# Patient Record
Sex: FEMALE | Race: BLACK OR AFRICAN AMERICAN | NOT HISPANIC OR LATINO | Employment: OTHER
[De-identification: names, ages, dates, MRNs, and addresses within clinical notes are randomized per-mention and may not be internally consistent; named-entity substitution may affect disease eponyms.]

---

## 2017-02-02 ENCOUNTER — RECORDS - HEALTHEAST (OUTPATIENT)
Dept: ADMINISTRATIVE | Facility: OTHER | Age: 77
End: 2017-02-02

## 2017-02-02 ENCOUNTER — RECORDS - HEALTHEAST (OUTPATIENT)
Dept: BONE DENSITY | Facility: CLINIC | Age: 77
End: 2017-02-02

## 2017-02-02 ENCOUNTER — COMMUNICATION - HEALTHEAST (OUTPATIENT)
Dept: INTERNAL MEDICINE | Facility: CLINIC | Age: 77
End: 2017-02-02

## 2017-02-02 ENCOUNTER — OFFICE VISIT - HEALTHEAST (OUTPATIENT)
Dept: INTERNAL MEDICINE | Facility: CLINIC | Age: 77
End: 2017-02-02

## 2017-02-02 DIAGNOSIS — I10 ESSENTIAL HYPERTENSION WITH GOAL BLOOD PRESSURE LESS THAN 140/90: ICD-10-CM

## 2017-02-02 DIAGNOSIS — M48.062 SPINAL STENOSIS, LUMBAR REGION, WITH NEUROGENIC CLAUDICATION: ICD-10-CM

## 2017-02-02 DIAGNOSIS — Z78.0 ASYMPTOMATIC MENOPAUSAL STATE: ICD-10-CM

## 2017-02-02 DIAGNOSIS — E11.9 CONTROLLED TYPE 2 DIABETES MELLITUS WITHOUT COMPLICATION, WITHOUT LONG-TERM CURRENT USE OF INSULIN (H): ICD-10-CM

## 2017-02-02 DIAGNOSIS — M48.061 SPINAL STENOSIS OF LUMBAR REGION: ICD-10-CM

## 2017-02-02 LAB — HBA1C MFR BLD: 6.4 % (ref 3.5–6)

## 2017-02-02 ASSESSMENT — MIFFLIN-ST. JEOR: SCORE: 1332.25

## 2017-02-03 ENCOUNTER — COMMUNICATION - HEALTHEAST (OUTPATIENT)
Dept: INTERNAL MEDICINE | Facility: CLINIC | Age: 77
End: 2017-02-03

## 2017-02-09 ENCOUNTER — AMBULATORY - HEALTHEAST (OUTPATIENT)
Dept: INTERNAL MEDICINE | Facility: CLINIC | Age: 77
End: 2017-02-09

## 2017-02-12 ENCOUNTER — COMMUNICATION - HEALTHEAST (OUTPATIENT)
Dept: INTERNAL MEDICINE | Facility: CLINIC | Age: 77
End: 2017-02-12

## 2017-02-12 DIAGNOSIS — I10 ESSENTIAL HYPERTENSION: ICD-10-CM

## 2017-02-13 ENCOUNTER — COMMUNICATION - HEALTHEAST (OUTPATIENT)
Dept: INTERNAL MEDICINE | Facility: CLINIC | Age: 77
End: 2017-02-13

## 2017-02-16 ENCOUNTER — AMBULATORY - HEALTHEAST (OUTPATIENT)
Dept: PODIATRY | Facility: CLINIC | Age: 77
End: 2017-02-16

## 2017-02-16 DIAGNOSIS — E11.9 TYPE 2 DIABETES MELLITUS WITHOUT COMPLICATION, WITHOUT LONG-TERM CURRENT USE OF INSULIN (H): ICD-10-CM

## 2017-02-16 DIAGNOSIS — L60.2 ONYCHAUXIS: ICD-10-CM

## 2017-02-16 DIAGNOSIS — B35.1 NAIL FUNGUS: ICD-10-CM

## 2017-03-13 ENCOUNTER — COMMUNICATION - HEALTHEAST (OUTPATIENT)
Dept: SCHEDULING | Facility: CLINIC | Age: 77
End: 2017-03-13

## 2017-03-14 ENCOUNTER — COMMUNICATION - HEALTHEAST (OUTPATIENT)
Dept: INTERNAL MEDICINE | Facility: CLINIC | Age: 77
End: 2017-03-14

## 2017-03-16 ENCOUNTER — COMMUNICATION - HEALTHEAST (OUTPATIENT)
Dept: INTERNAL MEDICINE | Facility: CLINIC | Age: 77
End: 2017-03-16

## 2017-03-16 DIAGNOSIS — E11.9 DIABETES (H): ICD-10-CM

## 2017-05-05 ENCOUNTER — COMMUNICATION - HEALTHEAST (OUTPATIENT)
Dept: INTERNAL MEDICINE | Facility: CLINIC | Age: 77
End: 2017-05-05

## 2017-05-25 ENCOUNTER — OFFICE VISIT - HEALTHEAST (OUTPATIENT)
Dept: INTERNAL MEDICINE | Facility: CLINIC | Age: 77
End: 2017-05-25

## 2017-05-25 DIAGNOSIS — E11.9 CONTROLLED TYPE 2 DIABETES MELLITUS WITHOUT COMPLICATION, WITHOUT LONG-TERM CURRENT USE OF INSULIN (H): ICD-10-CM

## 2017-05-25 DIAGNOSIS — I10 ESSENTIAL HYPERTENSION WITH GOAL BLOOD PRESSURE LESS THAN 140/90: ICD-10-CM

## 2017-05-25 LAB — HBA1C MFR BLD: 6.3 % (ref 3.5–6)

## 2017-05-25 ASSESSMENT — MIFFLIN-ST. JEOR: SCORE: 1330.44

## 2017-05-30 ENCOUNTER — AMBULATORY - HEALTHEAST (OUTPATIENT)
Dept: INTERNAL MEDICINE | Facility: CLINIC | Age: 77
End: 2017-05-30

## 2017-06-23 ENCOUNTER — COMMUNICATION - HEALTHEAST (OUTPATIENT)
Dept: INTERNAL MEDICINE | Facility: CLINIC | Age: 77
End: 2017-06-23

## 2017-06-24 ENCOUNTER — OFFICE VISIT - HEALTHEAST (OUTPATIENT)
Dept: FAMILY MEDICINE | Facility: CLINIC | Age: 77
End: 2017-06-24

## 2017-06-24 DIAGNOSIS — S80.812A ABRASION OF LEG, LEFT, INITIAL ENCOUNTER: ICD-10-CM

## 2017-06-24 DIAGNOSIS — S80.12XA CONTUSION OF LEFT LOWER LEG, INITIAL ENCOUNTER: ICD-10-CM

## 2017-06-30 ENCOUNTER — OFFICE VISIT - HEALTHEAST (OUTPATIENT)
Dept: INTERNAL MEDICINE | Facility: CLINIC | Age: 77
End: 2017-06-30

## 2017-06-30 DIAGNOSIS — S81.802A WOUND OF LEFT LOWER EXTREMITY: ICD-10-CM

## 2017-06-30 ASSESSMENT — MIFFLIN-ST. JEOR: SCORE: 1339.51

## 2017-07-05 ENCOUNTER — RECORDS - HEALTHEAST (OUTPATIENT)
Dept: ADMINISTRATIVE | Facility: OTHER | Age: 77
End: 2017-07-05

## 2017-07-10 ENCOUNTER — COMMUNICATION - HEALTHEAST (OUTPATIENT)
Dept: INTERNAL MEDICINE | Facility: CLINIC | Age: 77
End: 2017-07-10

## 2017-09-06 ENCOUNTER — OFFICE VISIT - HEALTHEAST (OUTPATIENT)
Dept: INTERNAL MEDICINE | Facility: CLINIC | Age: 77
End: 2017-09-06

## 2017-09-06 DIAGNOSIS — Z00.00 HEALTH CARE MAINTENANCE: ICD-10-CM

## 2017-09-06 DIAGNOSIS — E11.9 CONTROLLED TYPE 2 DIABETES MELLITUS WITHOUT COMPLICATION, WITHOUT LONG-TERM CURRENT USE OF INSULIN (H): ICD-10-CM

## 2017-09-06 DIAGNOSIS — I10 ESSENTIAL HYPERTENSION WITH GOAL BLOOD PRESSURE LESS THAN 140/90: ICD-10-CM

## 2017-09-06 LAB — HBA1C MFR BLD: 6.5 % (ref 3.5–6)

## 2017-09-06 ASSESSMENT — MIFFLIN-ST. JEOR: SCORE: 1335.43

## 2017-09-12 ENCOUNTER — COMMUNICATION - HEALTHEAST (OUTPATIENT)
Dept: SCHEDULING | Facility: CLINIC | Age: 77
End: 2017-09-12

## 2017-10-05 ENCOUNTER — AMBULATORY - HEALTHEAST (OUTPATIENT)
Dept: PODIATRY | Facility: CLINIC | Age: 77
End: 2017-10-05

## 2017-10-05 DIAGNOSIS — B35.1 NAIL FUNGUS: ICD-10-CM

## 2017-10-05 DIAGNOSIS — E11.9 TYPE 2 DIABETES MELLITUS WITHOUT COMPLICATION, WITHOUT LONG-TERM CURRENT USE OF INSULIN (H): ICD-10-CM

## 2017-10-05 DIAGNOSIS — L60.2 ONYCHAUXIS: ICD-10-CM

## 2017-10-05 ASSESSMENT — MIFFLIN-ST. JEOR: SCORE: 1334.98

## 2017-12-09 ENCOUNTER — HOSPITAL ENCOUNTER (OUTPATIENT)
Dept: MAMMOGRAPHY | Facility: HOSPITAL | Age: 77
Discharge: HOME | End: 2017-12-09
Attending: INTERNAL MEDICINE

## 2017-12-09 DIAGNOSIS — Z12.31 VISIT FOR SCREENING MAMMOGRAM: ICD-10-CM

## 2018-01-08 ENCOUNTER — OFFICE VISIT - HEALTHEAST (OUTPATIENT)
Dept: INTERNAL MEDICINE | Facility: CLINIC | Age: 78
End: 2018-01-08

## 2018-01-08 DIAGNOSIS — I10 ESSENTIAL (PRIMARY) HYPERTENSION: ICD-10-CM

## 2018-01-08 DIAGNOSIS — C44.319 BASAL CELL CARCINOMA, FOREHEAD: ICD-10-CM

## 2018-01-08 DIAGNOSIS — E11.9 DM II (DIABETES MELLITUS, TYPE II), CONTROLLED (H): ICD-10-CM

## 2018-01-08 DIAGNOSIS — I10 ESSENTIAL HYPERTENSION WITH GOAL BLOOD PRESSURE LESS THAN 140/90: ICD-10-CM

## 2018-01-08 LAB
ANION GAP SERPL CALCULATED.3IONS-SCNC: 10 MMOL/L (ref 5–18)
BUN SERPL-MCNC: 21 MG/DL (ref 8–28)
CALCIUM SERPL-MCNC: 9.8 MG/DL (ref 8.5–10.5)
CHLORIDE BLD-SCNC: 101 MMOL/L (ref 98–107)
CO2 SERPL-SCNC: 29 MMOL/L (ref 22–31)
CREAT SERPL-MCNC: 1.32 MG/DL (ref 0.6–1.1)
GFR SERPL CREATININE-BSD FRML MDRD: 39 ML/MIN/1.73M2
GLUCOSE BLD-MCNC: 138 MG/DL (ref 70–125)
POTASSIUM BLD-SCNC: 3.9 MMOL/L (ref 3.5–5)
SODIUM SERPL-SCNC: 140 MMOL/L (ref 136–145)

## 2018-01-09 LAB — HBA1C MFR BLD: 6.9 % (ref 4.2–6.1)

## 2018-01-14 ENCOUNTER — COMMUNICATION - HEALTHEAST (OUTPATIENT)
Dept: INTERNAL MEDICINE | Facility: CLINIC | Age: 78
End: 2018-01-14

## 2018-01-14 DIAGNOSIS — I10 ESSENTIAL HYPERTENSION WITH GOAL BLOOD PRESSURE LESS THAN 140/90: ICD-10-CM

## 2018-04-09 ENCOUNTER — COMMUNICATION - HEALTHEAST (OUTPATIENT)
Dept: INTERNAL MEDICINE | Facility: CLINIC | Age: 78
End: 2018-04-09

## 2018-04-09 DIAGNOSIS — E66.3 OVER WEIGHT: ICD-10-CM

## 2018-04-25 ENCOUNTER — COMMUNICATION - HEALTHEAST (OUTPATIENT)
Dept: INTERNAL MEDICINE | Facility: CLINIC | Age: 78
End: 2018-04-25

## 2018-04-25 DIAGNOSIS — E11.9 DM II (DIABETES MELLITUS, TYPE II), CONTROLLED (H): ICD-10-CM

## 2018-04-26 ENCOUNTER — COMMUNICATION - HEALTHEAST (OUTPATIENT)
Dept: INTERNAL MEDICINE | Facility: CLINIC | Age: 78
End: 2018-04-26

## 2018-07-10 ENCOUNTER — RECORDS - HEALTHEAST (OUTPATIENT)
Dept: ADMINISTRATIVE | Facility: OTHER | Age: 78
End: 2018-07-10

## 2018-07-17 ENCOUNTER — OFFICE VISIT - HEALTHEAST (OUTPATIENT)
Dept: INTERNAL MEDICINE | Facility: CLINIC | Age: 78
End: 2018-07-17

## 2018-07-17 DIAGNOSIS — N18.30 CKD (CHRONIC KIDNEY DISEASE) STAGE 3, GFR 30-59 ML/MIN (H): ICD-10-CM

## 2018-07-17 DIAGNOSIS — E11.22 CONTROLLED TYPE 2 DIABETES MELLITUS WITH STAGE 3 CHRONIC KIDNEY DISEASE, WITHOUT LONG-TERM CURRENT USE OF INSULIN (H): ICD-10-CM

## 2018-07-17 DIAGNOSIS — N18.30 CONTROLLED TYPE 2 DIABETES MELLITUS WITH STAGE 3 CHRONIC KIDNEY DISEASE, WITHOUT LONG-TERM CURRENT USE OF INSULIN (H): ICD-10-CM

## 2018-07-17 DIAGNOSIS — I10 ESSENTIAL HYPERTENSION WITH GOAL BLOOD PRESSURE LESS THAN 140/90: ICD-10-CM

## 2018-07-17 LAB
AST SERPL W P-5'-P-CCNC: 17 U/L (ref 0–40)
CHOLEST SERPL-MCNC: 147 MG/DL
FASTING STATUS PATIENT QL REPORTED: NO
HBA1C MFR BLD: 6.4 % (ref 3.5–6)
HDLC SERPL-MCNC: 66 MG/DL
LDLC SERPL CALC-MCNC: 66 MG/DL
TRIGL SERPL-MCNC: 77 MG/DL

## 2018-07-30 ENCOUNTER — HOSPITAL ENCOUNTER (OUTPATIENT)
Dept: NUTRITION | Facility: HOSPITAL | Age: 78
Discharge: HOME | End: 2018-07-30
Attending: INTERNAL MEDICINE

## 2018-08-20 ENCOUNTER — COMMUNICATION - HEALTHEAST (OUTPATIENT)
Dept: INTERNAL MEDICINE | Facility: CLINIC | Age: 78
End: 2018-08-20

## 2018-08-20 DIAGNOSIS — E87.6 HYPOKALEMIA: ICD-10-CM

## 2018-10-16 ENCOUNTER — COMMUNICATION - HEALTHEAST (OUTPATIENT)
Dept: INTERNAL MEDICINE | Facility: CLINIC | Age: 78
End: 2018-10-16

## 2018-10-16 DIAGNOSIS — E11.9 DM II (DIABETES MELLITUS, TYPE II), CONTROLLED (H): ICD-10-CM

## 2018-10-16 DIAGNOSIS — E87.6 HYPOKALEMIA: ICD-10-CM

## 2018-10-16 DIAGNOSIS — I10 ESSENTIAL HYPERTENSION WITH GOAL BLOOD PRESSURE LESS THAN 140/90: ICD-10-CM

## 2018-11-26 ENCOUNTER — COMMUNICATION - HEALTHEAST (OUTPATIENT)
Dept: INTERNAL MEDICINE | Facility: CLINIC | Age: 78
End: 2018-11-26

## 2018-11-27 ENCOUNTER — OFFICE VISIT - HEALTHEAST (OUTPATIENT)
Dept: INTERNAL MEDICINE | Facility: CLINIC | Age: 78
End: 2018-11-27

## 2018-11-27 DIAGNOSIS — N18.30 CONTROLLED TYPE 2 DIABETES MELLITUS WITH STAGE 3 CHRONIC KIDNEY DISEASE, WITHOUT LONG-TERM CURRENT USE OF INSULIN (H): ICD-10-CM

## 2018-11-27 DIAGNOSIS — E11.22 CONTROLLED TYPE 2 DIABETES MELLITUS WITH STAGE 3 CHRONIC KIDNEY DISEASE, WITHOUT LONG-TERM CURRENT USE OF INSULIN (H): ICD-10-CM

## 2018-11-27 DIAGNOSIS — L82.0 INFLAMED SEBORRHEIC KERATOSIS: ICD-10-CM

## 2018-11-27 LAB
CREAT UR-MCNC: 70 MG/DL
HBA1C MFR BLD: 6.4 % (ref 3.5–6)
MICROALBUMIN UR-MCNC: <0.5 MG/DL (ref 0–1.99)
MICROALBUMIN/CREAT UR: NORMAL MG/G

## 2018-11-27 ASSESSMENT — MIFFLIN-ST. JEOR: SCORE: 1316.83

## 2018-12-03 ENCOUNTER — COMMUNICATION - HEALTHEAST (OUTPATIENT)
Dept: INTERNAL MEDICINE | Facility: CLINIC | Age: 78
End: 2018-12-03

## 2018-12-03 DIAGNOSIS — E87.6 HYPOKALEMIA: ICD-10-CM

## 2019-01-16 ENCOUNTER — COMMUNICATION - HEALTHEAST (OUTPATIENT)
Dept: INTERNAL MEDICINE | Facility: CLINIC | Age: 79
End: 2019-01-16

## 2019-01-16 DIAGNOSIS — I10 ESSENTIAL HYPERTENSION WITH GOAL BLOOD PRESSURE LESS THAN 140/90: ICD-10-CM

## 2019-02-21 ENCOUNTER — COMMUNICATION - HEALTHEAST (OUTPATIENT)
Dept: INTERNAL MEDICINE | Facility: CLINIC | Age: 79
End: 2019-02-21

## 2019-02-21 DIAGNOSIS — I10 ESSENTIAL HYPERTENSION WITH GOAL BLOOD PRESSURE LESS THAN 140/90: ICD-10-CM

## 2019-03-10 ENCOUNTER — COMMUNICATION - HEALTHEAST (OUTPATIENT)
Dept: INTERNAL MEDICINE | Facility: CLINIC | Age: 79
End: 2019-03-10

## 2019-03-10 DIAGNOSIS — E11.9 DM II (DIABETES MELLITUS, TYPE II), CONTROLLED (H): ICD-10-CM

## 2019-03-20 ENCOUNTER — COMMUNICATION - HEALTHEAST (OUTPATIENT)
Dept: SCHEDULING | Facility: CLINIC | Age: 79
End: 2019-03-20

## 2019-03-22 ENCOUNTER — OFFICE VISIT - HEALTHEAST (OUTPATIENT)
Dept: INTERNAL MEDICINE | Facility: CLINIC | Age: 79
End: 2019-03-22

## 2019-03-22 DIAGNOSIS — E11.22 CONTROLLED TYPE 2 DIABETES MELLITUS WITH STAGE 3 CHRONIC KIDNEY DISEASE, WITHOUT LONG-TERM CURRENT USE OF INSULIN (H): ICD-10-CM

## 2019-03-22 DIAGNOSIS — I10 ESSENTIAL HYPERTENSION WITH GOAL BLOOD PRESSURE LESS THAN 140/90: ICD-10-CM

## 2019-03-22 DIAGNOSIS — Z78.0 POST-MENOPAUSAL: ICD-10-CM

## 2019-03-22 DIAGNOSIS — Z12.31 ENCOUNTER FOR SCREENING MAMMOGRAM FOR BREAST CANCER: ICD-10-CM

## 2019-03-22 DIAGNOSIS — L82.0 INFLAMED SEBORRHEIC KERATOSIS: ICD-10-CM

## 2019-03-22 DIAGNOSIS — N18.30 CONTROLLED TYPE 2 DIABETES MELLITUS WITH STAGE 3 CHRONIC KIDNEY DISEASE, WITHOUT LONG-TERM CURRENT USE OF INSULIN (H): ICD-10-CM

## 2019-03-22 LAB
ALBUMIN SERPL-MCNC: 4.4 G/DL (ref 3.5–5)
ALP SERPL-CCNC: 52 U/L (ref 45–120)
ALT SERPL W P-5'-P-CCNC: 12 U/L (ref 0–45)
ANION GAP SERPL CALCULATED.3IONS-SCNC: 12 MMOL/L (ref 5–18)
AST SERPL W P-5'-P-CCNC: 20 U/L (ref 0–40)
BILIRUB SERPL-MCNC: 0.6 MG/DL (ref 0–1)
BUN SERPL-MCNC: 28 MG/DL (ref 8–28)
CALCIUM SERPL-MCNC: 10.8 MG/DL (ref 8.5–10.5)
CHLORIDE BLD-SCNC: 101 MMOL/L (ref 98–107)
CHOLEST SERPL-MCNC: 148 MG/DL
CO2 SERPL-SCNC: 29 MMOL/L (ref 22–31)
CREAT SERPL-MCNC: 1.39 MG/DL (ref 0.6–1.1)
ERYTHROCYTE [DISTWIDTH] IN BLOOD BY AUTOMATED COUNT: 12.9 % (ref 11–14.5)
GFR SERPL CREATININE-BSD FRML MDRD: 37 ML/MIN/1.73M2
GLUCOSE BLD-MCNC: 88 MG/DL (ref 70–125)
HBA1C MFR BLD: 5.9 % (ref 3.5–6)
HCT VFR BLD AUTO: 37.5 % (ref 35–47)
HDLC SERPL-MCNC: 63 MG/DL
HGB BLD-MCNC: 12.5 G/DL (ref 12–16)
LDLC SERPL CALC-MCNC: 67 MG/DL
MCH RBC QN AUTO: 30.1 PG (ref 27–34)
MCHC RBC AUTO-ENTMCNC: 33.3 G/DL (ref 32–36)
MCV RBC AUTO: 90 FL (ref 80–100)
PLATELET # BLD AUTO: 222 THOU/UL (ref 140–440)
PMV BLD AUTO: 9 FL (ref 7–10)
POTASSIUM BLD-SCNC: 3.9 MMOL/L (ref 3.5–5)
PROT SERPL-MCNC: 6.8 G/DL (ref 6–8)
RBC # BLD AUTO: 4.14 MILL/UL (ref 3.8–5.4)
SODIUM SERPL-SCNC: 142 MMOL/L (ref 136–145)
TRIGL SERPL-MCNC: 88 MG/DL
WBC: 3.8 THOU/UL (ref 4–11)

## 2019-03-22 ASSESSMENT — MIFFLIN-ST. JEOR: SCORE: 1313.66

## 2019-03-25 ENCOUNTER — RECORDS - HEALTHEAST (OUTPATIENT)
Dept: HEALTH INFORMATION MANAGEMENT | Facility: CLINIC | Age: 79
End: 2019-03-25

## 2019-04-01 ENCOUNTER — RECORDS - HEALTHEAST (OUTPATIENT)
Dept: ADMINISTRATIVE | Facility: OTHER | Age: 79
End: 2019-04-01

## 2019-04-01 ENCOUNTER — RECORDS - HEALTHEAST (OUTPATIENT)
Dept: MAMMOGRAPHY | Facility: CLINIC | Age: 79
End: 2019-04-01

## 2019-04-01 ENCOUNTER — RECORDS - HEALTHEAST (OUTPATIENT)
Dept: BONE DENSITY | Facility: CLINIC | Age: 79
End: 2019-04-01

## 2019-04-01 DIAGNOSIS — Z78.0 ASYMPTOMATIC MENOPAUSAL STATE: ICD-10-CM

## 2019-04-01 DIAGNOSIS — Z12.31 ENCOUNTER FOR SCREENING MAMMOGRAM FOR MALIGNANT NEOPLASM OF BREAST: ICD-10-CM

## 2019-04-19 ENCOUNTER — COMMUNICATION - HEALTHEAST (OUTPATIENT)
Dept: INTERNAL MEDICINE | Facility: CLINIC | Age: 79
End: 2019-04-19

## 2019-04-29 ENCOUNTER — COMMUNICATION - HEALTHEAST (OUTPATIENT)
Dept: INTERNAL MEDICINE | Facility: CLINIC | Age: 79
End: 2019-04-29

## 2019-06-20 ENCOUNTER — COMMUNICATION - HEALTHEAST (OUTPATIENT)
Dept: INTERNAL MEDICINE | Facility: CLINIC | Age: 79
End: 2019-06-20

## 2019-07-01 ENCOUNTER — COMMUNICATION - HEALTHEAST (OUTPATIENT)
Dept: INTERNAL MEDICINE | Facility: CLINIC | Age: 79
End: 2019-07-01

## 2019-07-01 DIAGNOSIS — E11.9 CONTROLLED TYPE 2 DIABETES MELLITUS WITHOUT COMPLICATION, WITHOUT LONG-TERM CURRENT USE OF INSULIN (H): ICD-10-CM

## 2019-07-16 ENCOUNTER — RECORDS - HEALTHEAST (OUTPATIENT)
Dept: ADMINISTRATIVE | Facility: OTHER | Age: 79
End: 2019-07-16

## 2019-07-23 ENCOUNTER — RECORDS - HEALTHEAST (OUTPATIENT)
Dept: HEALTH INFORMATION MANAGEMENT | Facility: CLINIC | Age: 79
End: 2019-07-23

## 2019-07-30 ENCOUNTER — RECORDS - HEALTHEAST (OUTPATIENT)
Dept: ADMINISTRATIVE | Facility: OTHER | Age: 79
End: 2019-07-30

## 2019-09-11 ENCOUNTER — OFFICE VISIT - HEALTHEAST (OUTPATIENT)
Dept: INTERNAL MEDICINE | Facility: CLINIC | Age: 79
End: 2019-09-11

## 2019-09-11 DIAGNOSIS — N18.30 CONTROLLED TYPE 2 DIABETES MELLITUS WITH STAGE 3 CHRONIC KIDNEY DISEASE, WITHOUT LONG-TERM CURRENT USE OF INSULIN (H): ICD-10-CM

## 2019-09-11 DIAGNOSIS — I10 ESSENTIAL HYPERTENSION WITH GOAL BLOOD PRESSURE LESS THAN 140/90: ICD-10-CM

## 2019-09-11 DIAGNOSIS — M79.645 PAIN OF LEFT THUMB: ICD-10-CM

## 2019-09-11 DIAGNOSIS — E11.22 CONTROLLED TYPE 2 DIABETES MELLITUS WITH STAGE 3 CHRONIC KIDNEY DISEASE, WITHOUT LONG-TERM CURRENT USE OF INSULIN (H): ICD-10-CM

## 2019-09-11 LAB
ANION GAP SERPL CALCULATED.3IONS-SCNC: 10 MMOL/L (ref 5–18)
BUN SERPL-MCNC: 26 MG/DL (ref 8–28)
CALCIUM SERPL-MCNC: 10.1 MG/DL (ref 8.5–10.5)
CHLORIDE BLD-SCNC: 103 MMOL/L (ref 98–107)
CO2 SERPL-SCNC: 30 MMOL/L (ref 22–31)
CREAT SERPL-MCNC: 1.45 MG/DL (ref 0.6–1.1)
CREAT UR-MCNC: 59.7 MG/DL
GFR SERPL CREATININE-BSD FRML MDRD: 35 ML/MIN/1.73M2
GLUCOSE BLD-MCNC: 90 MG/DL (ref 70–125)
HBA1C MFR BLD: 6.4 % (ref 3.5–6)
MICROALBUMIN UR-MCNC: <0.5 MG/DL (ref 0–1.99)
MICROALBUMIN/CREAT UR: NORMAL MG/G{CREAT}
POTASSIUM BLD-SCNC: 3.9 MMOL/L (ref 3.5–5)
SODIUM SERPL-SCNC: 143 MMOL/L (ref 136–145)
URATE SERPL-MCNC: 6.4 MG/DL (ref 2–7.5)

## 2019-09-12 ENCOUNTER — AMBULATORY - HEALTHEAST (OUTPATIENT)
Dept: INTERNAL MEDICINE | Facility: CLINIC | Age: 79
End: 2019-09-12

## 2019-09-12 DIAGNOSIS — E79.0 ELEVATED URIC ACID IN BLOOD: ICD-10-CM

## 2019-09-17 ENCOUNTER — COMMUNICATION - HEALTHEAST (OUTPATIENT)
Dept: INTERNAL MEDICINE | Facility: CLINIC | Age: 79
End: 2019-09-17

## 2019-09-17 DIAGNOSIS — E79.0 ELEVATED URIC ACID IN BLOOD: ICD-10-CM

## 2019-09-27 ENCOUNTER — COMMUNICATION - HEALTHEAST (OUTPATIENT)
Dept: INTERNAL MEDICINE | Facility: CLINIC | Age: 79
End: 2019-09-27

## 2019-09-27 DIAGNOSIS — E11.9 DM II (DIABETES MELLITUS, TYPE II), CONTROLLED (H): ICD-10-CM

## 2019-11-30 ENCOUNTER — COMMUNICATION - HEALTHEAST (OUTPATIENT)
Dept: INTERNAL MEDICINE | Facility: CLINIC | Age: 79
End: 2019-11-30

## 2019-11-30 DIAGNOSIS — I10 ESSENTIAL HYPERTENSION WITH GOAL BLOOD PRESSURE LESS THAN 140/90: ICD-10-CM

## 2019-12-03 ENCOUNTER — COMMUNICATION - HEALTHEAST (OUTPATIENT)
Dept: INTERNAL MEDICINE | Facility: CLINIC | Age: 79
End: 2019-12-03

## 2019-12-03 DIAGNOSIS — E87.6 HYPOKALEMIA: ICD-10-CM

## 2019-12-11 ENCOUNTER — COMMUNICATION - HEALTHEAST (OUTPATIENT)
Dept: INTERNAL MEDICINE | Facility: CLINIC | Age: 79
End: 2019-12-11

## 2019-12-11 DIAGNOSIS — I10 ESSENTIAL HYPERTENSION WITH GOAL BLOOD PRESSURE LESS THAN 140/90: ICD-10-CM

## 2020-01-31 ENCOUNTER — COMMUNICATION - HEALTHEAST (OUTPATIENT)
Dept: INTERNAL MEDICINE | Facility: CLINIC | Age: 80
End: 2020-01-31

## 2020-01-31 DIAGNOSIS — I10 ESSENTIAL HYPERTENSION WITH GOAL BLOOD PRESSURE LESS THAN 140/90: ICD-10-CM

## 2020-03-06 ENCOUNTER — COMMUNICATION - HEALTHEAST (OUTPATIENT)
Dept: INTERNAL MEDICINE | Facility: CLINIC | Age: 80
End: 2020-03-06

## 2020-03-06 DIAGNOSIS — N18.30 CONTROLLED TYPE 2 DIABETES MELLITUS WITH STAGE 3 CHRONIC KIDNEY DISEASE, WITHOUT LONG-TERM CURRENT USE OF INSULIN (H): ICD-10-CM

## 2020-03-06 DIAGNOSIS — E11.22 CONTROLLED TYPE 2 DIABETES MELLITUS WITH STAGE 3 CHRONIC KIDNEY DISEASE, WITHOUT LONG-TERM CURRENT USE OF INSULIN (H): ICD-10-CM

## 2020-04-20 ENCOUNTER — COMMUNICATION - HEALTHEAST (OUTPATIENT)
Dept: INTERNAL MEDICINE | Facility: CLINIC | Age: 80
End: 2020-04-20

## 2020-04-20 DIAGNOSIS — E87.6 HYPOKALEMIA: ICD-10-CM

## 2020-04-23 ENCOUNTER — COMMUNICATION - HEALTHEAST (OUTPATIENT)
Dept: SCHEDULING | Facility: CLINIC | Age: 80
End: 2020-04-23

## 2020-04-23 DIAGNOSIS — E11.22 CONTROLLED TYPE 2 DIABETES MELLITUS WITH STAGE 3 CHRONIC KIDNEY DISEASE, WITHOUT LONG-TERM CURRENT USE OF INSULIN (H): ICD-10-CM

## 2020-04-23 DIAGNOSIS — N18.30 CONTROLLED TYPE 2 DIABETES MELLITUS WITH STAGE 3 CHRONIC KIDNEY DISEASE, WITHOUT LONG-TERM CURRENT USE OF INSULIN (H): ICD-10-CM

## 2020-06-14 ENCOUNTER — COMMUNICATION - HEALTHEAST (OUTPATIENT)
Dept: INTERNAL MEDICINE | Facility: CLINIC | Age: 80
End: 2020-06-14

## 2020-06-14 DIAGNOSIS — E11.9 DM II (DIABETES MELLITUS, TYPE II), CONTROLLED (H): ICD-10-CM

## 2020-06-22 ENCOUNTER — COMMUNICATION - HEALTHEAST (OUTPATIENT)
Dept: INTERNAL MEDICINE | Facility: CLINIC | Age: 80
End: 2020-06-22

## 2020-06-22 DIAGNOSIS — I10 ESSENTIAL HYPERTENSION WITH GOAL BLOOD PRESSURE LESS THAN 140/90: ICD-10-CM

## 2020-07-01 ENCOUNTER — COMMUNICATION - HEALTHEAST (OUTPATIENT)
Dept: INTERNAL MEDICINE | Facility: CLINIC | Age: 80
End: 2020-07-01

## 2020-07-05 ENCOUNTER — COMMUNICATION - HEALTHEAST (OUTPATIENT)
Dept: INTERNAL MEDICINE | Facility: CLINIC | Age: 80
End: 2020-07-05

## 2020-07-15 ENCOUNTER — OFFICE VISIT - HEALTHEAST (OUTPATIENT)
Dept: INTERNAL MEDICINE | Facility: CLINIC | Age: 80
End: 2020-07-15

## 2020-07-15 DIAGNOSIS — E11.22 CONTROLLED TYPE 2 DIABETES MELLITUS WITH STAGE 3 CHRONIC KIDNEY DISEASE, WITHOUT LONG-TERM CURRENT USE OF INSULIN (H): ICD-10-CM

## 2020-07-15 DIAGNOSIS — N18.30 CONTROLLED TYPE 2 DIABETES MELLITUS WITH STAGE 3 CHRONIC KIDNEY DISEASE, WITHOUT LONG-TERM CURRENT USE OF INSULIN (H): ICD-10-CM

## 2020-07-15 DIAGNOSIS — Z12.31 VISIT FOR SCREENING MAMMOGRAM: ICD-10-CM

## 2020-07-15 DIAGNOSIS — I10 ESSENTIAL HYPERTENSION WITH GOAL BLOOD PRESSURE LESS THAN 140/90: ICD-10-CM

## 2020-07-15 DIAGNOSIS — R63.5 WEIGHT GAIN: ICD-10-CM

## 2020-07-15 DIAGNOSIS — Z23 ENCOUNTER FOR IMMUNIZATION: ICD-10-CM

## 2020-07-15 DIAGNOSIS — E87.6 HYPOKALEMIA: ICD-10-CM

## 2020-07-15 LAB — HBA1C MFR BLD: 6.4 % (ref 3.5–6)

## 2020-07-15 RX ORDER — SIMVASTATIN 10 MG
10 TABLET ORAL AT BEDTIME
Qty: 90 TABLET | Refills: 3 | Status: SHIPPED | OUTPATIENT
Start: 2020-07-15 | End: 2021-10-13

## 2020-07-15 RX ORDER — LISINOPRIL 20 MG/1
20 TABLET ORAL AT BEDTIME
Qty: 90 TABLET | Refills: 3 | Status: SHIPPED | OUTPATIENT
Start: 2020-07-15 | End: 2021-10-05

## 2020-07-15 RX ORDER — POTASSIUM CHLORIDE 1500 MG/1
40 TABLET, EXTENDED RELEASE ORAL DAILY
Qty: 180 TABLET | Refills: 3 | Status: SHIPPED | OUTPATIENT
Start: 2020-07-15 | End: 2022-06-13

## 2020-07-15 ASSESSMENT — MIFFLIN-ST. JEOR: SCORE: 1365.59

## 2020-07-16 LAB
ALBUMIN SERPL-MCNC: 4.2 G/DL (ref 3.5–5)
ALP SERPL-CCNC: 65 U/L (ref 45–120)
ALT SERPL W P-5'-P-CCNC: 12 U/L (ref 0–45)
ANION GAP SERPL CALCULATED.3IONS-SCNC: 11 MMOL/L (ref 5–18)
AST SERPL W P-5'-P-CCNC: 25 U/L (ref 0–40)
BILIRUB SERPL-MCNC: 0.7 MG/DL (ref 0–1)
BUN SERPL-MCNC: 27 MG/DL (ref 8–28)
CALCIUM SERPL-MCNC: 9.9 MG/DL (ref 8.5–10.5)
CHLORIDE BLD-SCNC: 101 MMOL/L (ref 98–107)
CHOLEST SERPL-MCNC: 166 MG/DL
CO2 SERPL-SCNC: 28 MMOL/L (ref 22–31)
CREAT SERPL-MCNC: 1.74 MG/DL (ref 0.6–1.1)
CREAT UR-MCNC: 91.1 MG/DL
FASTING STATUS PATIENT QL REPORTED: NO
GFR SERPL CREATININE-BSD FRML MDRD: 28 ML/MIN/1.73M2
GLUCOSE BLD-MCNC: 162 MG/DL (ref 70–125)
HDLC SERPL-MCNC: 67 MG/DL
LDLC SERPL CALC-MCNC: 78 MG/DL
MICROALBUMIN UR-MCNC: 0.88 MG/DL (ref 0–1.99)
MICROALBUMIN/CREAT UR: 9.7 MG/G
POTASSIUM BLD-SCNC: 3.5 MMOL/L (ref 3.5–5)
PROT SERPL-MCNC: 7 G/DL (ref 6–8)
SODIUM SERPL-SCNC: 140 MMOL/L (ref 136–145)
TRIGL SERPL-MCNC: 103 MG/DL
TSH SERPL DL<=0.005 MIU/L-ACNC: 0.57 UIU/ML (ref 0.3–5)

## 2020-07-24 ENCOUNTER — COMMUNICATION - HEALTHEAST (OUTPATIENT)
Dept: INTERNAL MEDICINE | Facility: CLINIC | Age: 80
End: 2020-07-24

## 2020-07-24 DIAGNOSIS — D64.9 ANEMIA, UNSPECIFIED TYPE: ICD-10-CM

## 2020-07-27 ENCOUNTER — COMMUNICATION - HEALTHEAST (OUTPATIENT)
Dept: INTERNAL MEDICINE | Facility: CLINIC | Age: 80
End: 2020-07-27

## 2020-07-28 ENCOUNTER — AMBULATORY - HEALTHEAST (OUTPATIENT)
Dept: LAB | Facility: CLINIC | Age: 80
End: 2020-07-28

## 2020-07-28 DIAGNOSIS — D64.9 ANEMIA, UNSPECIFIED TYPE: ICD-10-CM

## 2020-07-28 LAB
ERYTHROCYTE [DISTWIDTH] IN BLOOD BY AUTOMATED COUNT: 14.1 % (ref 11–14.5)
ERYTHROCYTE [SEDIMENTATION RATE] IN BLOOD BY WESTERGREN METHOD: 12 MM/HR (ref 0–20)
FERRITIN SERPL-MCNC: 8 NG/ML (ref 10–130)
HCT VFR BLD AUTO: 34.2 % (ref 35–47)
HGB BLD-MCNC: 11.5 G/DL (ref 12–16)
IRON SATN MFR SERPL: 12 % (ref 20–50)
IRON SERPL-MCNC: 45 UG/DL (ref 42–175)
MCH RBC QN AUTO: 29.6 PG (ref 27–34)
MCHC RBC AUTO-ENTMCNC: 33.6 G/DL (ref 32–36)
MCV RBC AUTO: 88 FL (ref 80–100)
PLATELET # BLD AUTO: 235 THOU/UL (ref 140–440)
PMV BLD AUTO: 7.8 FL (ref 7–10)
RBC # BLD AUTO: 3.88 MILL/UL (ref 3.8–5.4)
TIBC SERPL-MCNC: 382 UG/DL (ref 313–563)
TRANSFERRIN SERPL-MCNC: 305 MG/DL (ref 212–360)
VIT B12 SERPL-MCNC: 716 PG/ML (ref 213–816)
WBC: 4.8 THOU/UL (ref 4–11)

## 2020-07-31 ENCOUNTER — AMBULATORY - HEALTHEAST (OUTPATIENT)
Dept: INTERNAL MEDICINE | Facility: CLINIC | Age: 80
End: 2020-07-31

## 2020-07-31 DIAGNOSIS — D50.0 IRON DEFICIENCY ANEMIA DUE TO CHRONIC BLOOD LOSS: ICD-10-CM

## 2020-07-31 DIAGNOSIS — D64.9 ANEMIA, UNSPECIFIED TYPE: ICD-10-CM

## 2020-08-04 ENCOUNTER — RECORDS - HEALTHEAST (OUTPATIENT)
Dept: ADMINISTRATIVE | Facility: OTHER | Age: 80
End: 2020-08-04

## 2020-08-04 LAB — RETINOPATHY: NEGATIVE

## 2020-08-05 ENCOUNTER — HOSPITAL ENCOUNTER (OUTPATIENT)
Dept: MAMMOGRAPHY | Facility: CLINIC | Age: 80
Discharge: HOME | End: 2020-08-05
Attending: INTERNAL MEDICINE

## 2020-08-05 ENCOUNTER — COMMUNICATION - HEALTHEAST (OUTPATIENT)
Dept: INTERNAL MEDICINE | Facility: CLINIC | Age: 80
End: 2020-08-05

## 2020-08-05 DIAGNOSIS — Z12.31 VISIT FOR SCREENING MAMMOGRAM: ICD-10-CM

## 2020-08-07 ENCOUNTER — RECORDS - HEALTHEAST (OUTPATIENT)
Dept: HEALTH INFORMATION MANAGEMENT | Facility: CLINIC | Age: 80
End: 2020-08-07

## 2020-10-12 ENCOUNTER — COMMUNICATION - HEALTHEAST (OUTPATIENT)
Dept: INTERNAL MEDICINE | Facility: CLINIC | Age: 80
End: 2020-10-12

## 2020-11-17 ENCOUNTER — COMMUNICATION - HEALTHEAST (OUTPATIENT)
Dept: INTERNAL MEDICINE | Facility: CLINIC | Age: 80
End: 2020-11-17

## 2020-11-17 DIAGNOSIS — I10 ESSENTIAL HYPERTENSION WITH GOAL BLOOD PRESSURE LESS THAN 140/90: ICD-10-CM

## 2021-01-08 ENCOUNTER — COMMUNICATION - HEALTHEAST (OUTPATIENT)
Dept: INTERNAL MEDICINE | Facility: CLINIC | Age: 81
End: 2021-01-08

## 2021-01-08 DIAGNOSIS — L98.9 SKIN LESION: ICD-10-CM

## 2021-02-02 ENCOUNTER — OFFICE VISIT - HEALTHEAST (OUTPATIENT)
Dept: INTERNAL MEDICINE | Facility: CLINIC | Age: 81
End: 2021-02-02

## 2021-02-02 DIAGNOSIS — D50.0 IRON DEFICIENCY ANEMIA DUE TO CHRONIC BLOOD LOSS: ICD-10-CM

## 2021-02-02 DIAGNOSIS — E11.22 CONTROLLED TYPE 2 DIABETES MELLITUS WITH STAGE 3 CHRONIC KIDNEY DISEASE, WITHOUT LONG-TERM CURRENT USE OF INSULIN (H): ICD-10-CM

## 2021-02-02 DIAGNOSIS — E55.9 VITAMIN D DEFICIENCY: ICD-10-CM

## 2021-02-02 DIAGNOSIS — I10 ESSENTIAL HYPERTENSION WITH GOAL BLOOD PRESSURE LESS THAN 140/90: ICD-10-CM

## 2021-02-02 DIAGNOSIS — N18.32 STAGE 3B CHRONIC KIDNEY DISEASE (H): ICD-10-CM

## 2021-02-02 DIAGNOSIS — N18.30 CONTROLLED TYPE 2 DIABETES MELLITUS WITH STAGE 3 CHRONIC KIDNEY DISEASE, WITHOUT LONG-TERM CURRENT USE OF INSULIN (H): ICD-10-CM

## 2021-02-02 RX ORDER — HYDROCHLOROTHIAZIDE 25 MG/1
25 TABLET ORAL DAILY
Qty: 90 TABLET | Refills: 3 | Status: SHIPPED | OUTPATIENT
Start: 2021-02-02 | End: 2021-08-06

## 2021-02-02 RX ORDER — FERROUS SULFATE 325(65) MG
1 TABLET ORAL
Status: SHIPPED | COMMUNITY
Start: 2021-02-02 | End: 2024-05-30

## 2021-02-04 ENCOUNTER — COMMUNICATION - HEALTHEAST (OUTPATIENT)
Dept: INTERNAL MEDICINE | Facility: CLINIC | Age: 81
End: 2021-02-04

## 2021-02-05 ENCOUNTER — AMBULATORY - HEALTHEAST (OUTPATIENT)
Dept: NURSING | Facility: CLINIC | Age: 81
End: 2021-02-05

## 2021-02-05 ENCOUNTER — AMBULATORY - HEALTHEAST (OUTPATIENT)
Dept: LAB | Facility: CLINIC | Age: 81
End: 2021-02-05

## 2021-02-05 DIAGNOSIS — E55.9 VITAMIN D DEFICIENCY: ICD-10-CM

## 2021-02-05 DIAGNOSIS — E11.22 CONTROLLED TYPE 2 DIABETES MELLITUS WITH STAGE 3 CHRONIC KIDNEY DISEASE, WITHOUT LONG-TERM CURRENT USE OF INSULIN (H): ICD-10-CM

## 2021-02-05 DIAGNOSIS — I10 ESSENTIAL HYPERTENSION WITH GOAL BLOOD PRESSURE LESS THAN 140/90: ICD-10-CM

## 2021-02-05 DIAGNOSIS — N18.30 CONTROLLED TYPE 2 DIABETES MELLITUS WITH STAGE 3 CHRONIC KIDNEY DISEASE, WITHOUT LONG-TERM CURRENT USE OF INSULIN (H): ICD-10-CM

## 2021-02-05 DIAGNOSIS — D50.0 IRON DEFICIENCY ANEMIA DUE TO CHRONIC BLOOD LOSS: ICD-10-CM

## 2021-02-05 LAB
ALBUMIN SERPL-MCNC: 4 G/DL (ref 3.5–5)
ALP SERPL-CCNC: 54 U/L (ref 45–120)
ALT SERPL W P-5'-P-CCNC: 11 U/L (ref 0–45)
ANION GAP SERPL CALCULATED.3IONS-SCNC: 8 MMOL/L (ref 5–18)
AST SERPL W P-5'-P-CCNC: 20 U/L (ref 0–40)
BILIRUB SERPL-MCNC: 0.6 MG/DL (ref 0–1)
BUN SERPL-MCNC: 17 MG/DL (ref 8–28)
CALCIUM SERPL-MCNC: 9.4 MG/DL (ref 8.5–10.5)
CHLORIDE BLD-SCNC: 106 MMOL/L (ref 98–107)
CO2 SERPL-SCNC: 28 MMOL/L (ref 22–31)
CREAT SERPL-MCNC: 1.52 MG/DL (ref 0.6–1.1)
ERYTHROCYTE [DISTWIDTH] IN BLOOD BY AUTOMATED COUNT: 14.4 % (ref 11–14.5)
GFR SERPL CREATININE-BSD FRML MDRD: 33 ML/MIN/1.73M2
GLUCOSE BLD-MCNC: 105 MG/DL (ref 70–125)
HBA1C MFR BLD: 6.4 %
HCT VFR BLD AUTO: 37.2 % (ref 35–47)
HGB BLD-MCNC: 11.9 G/DL (ref 12–16)
MCH RBC QN AUTO: 30 PG (ref 27–34)
MCHC RBC AUTO-ENTMCNC: 32 G/DL (ref 32–36)
MCV RBC AUTO: 94 FL (ref 80–100)
PLATELET # BLD AUTO: 221 THOU/UL (ref 140–440)
PMV BLD AUTO: 10.6 FL (ref 7–10)
POTASSIUM BLD-SCNC: 3.7 MMOL/L (ref 3.5–5)
PROT SERPL-MCNC: 6.4 G/DL (ref 6–8)
RBC # BLD AUTO: 3.97 MILL/UL (ref 3.8–5.4)
SODIUM SERPL-SCNC: 142 MMOL/L (ref 136–145)
WBC: 4.2 THOU/UL (ref 4–11)

## 2021-02-08 LAB
25(OH)D3 SERPL-MCNC: 39.9 NG/ML (ref 30–80)
25(OH)D3 SERPL-MCNC: 39.9 NG/ML (ref 30–80)

## 2021-02-09 ENCOUNTER — RECORDS - HEALTHEAST (OUTPATIENT)
Dept: ADMINISTRATIVE | Facility: OTHER | Age: 81
End: 2021-02-09

## 2021-02-09 LAB — RETINOPATHY: NEGATIVE

## 2021-02-23 ENCOUNTER — RECORDS - HEALTHEAST (OUTPATIENT)
Dept: HEALTH INFORMATION MANAGEMENT | Facility: CLINIC | Age: 81
End: 2021-02-23

## 2021-02-26 ENCOUNTER — AMBULATORY - HEALTHEAST (OUTPATIENT)
Dept: NURSING | Facility: CLINIC | Age: 81
End: 2021-02-26

## 2021-05-26 NOTE — PROGRESS NOTES
ASSESSMENT:  1. Controlled type 2 diabetes mellitus with stage 3 chronic kidney disease, without long-term current use of insulin (H)  Stable.  Update labs.  Refill meds to mail order  - Glycosylated Hemoglobin A1c  - JIC LAV  - JI RED  - metFORMIN (GLUCOPHAGE) 1000 MG tablet; TAKE ONE TABLET BY MOUTH TWICE DAILY WITH MEALS  Dispense: 180 tablet; Refill: 3  - simvastatin (ZOCOR) 10 MG tablet; Take 1 tablet (10 mg total) by mouth at bedtime.  Dispense: 90 tablet; Refill: 3  - HM2(CBC w/o Differential)  - Lipid Cascade    2. Post-menopausal  - DXA Bone Density Scan; Future    3. Encounter for screening mammogram for breast cancer  - Mammo Screening Bilateral; Future    4. Essential hypertension with goal blood pressure less than 140/90  - hydroCHLOROthiazide (HYDRODIURIL) 50 MG tablet; Take 1 tablet (50 mg total) by mouth daily.  Dispense: 90 tablet; Refill: 3  - lisinopril (PRINIVIL,ZESTRIL) 20 MG tablet; TAKE ONE TABLET BY MOUTH ONE TIME DAILY AT BEDTIME  Dispense: 90 tablet; Refill: 3  - potassium chloride (K-DUR,KLOR-CON) 20 MEQ tablet; Take 2 tablets (40 mEq total) by mouth daily.  Dispense: 180 tablet; Refill: 3  - Comprehensive Metabolic Panel    5. Inflamed seborrheic keratosis  Examined.  Reassured.  Benign        PLAN:  Patient Instructions   Try prescriptions to [facility] mail order    Update blood tests    Shingles shot, get on the list    Diabetes is very good at 5.9    Seborrheic keratoses      Orders Placed This Encounter   Procedures     DXA Bone Density Scan     Standing Status:   Future     Standing Expiration Date:   3/22/2020     Order Specific Question:   Can the procedure be changed per Radiologist protocol?     Answer:   Yes     Mammo Screening Bilateral     Standing Status:   Future     Standing Expiration Date:   6/22/2020     Order Specific Question:   Is tomosynthesis (3D) requested?     Answer:   Yes     Order Specific Question:   Patient's previous breast density:     Answer:   Scattered  fibroglandular density [2]     Order Specific Question:   Can the procedure be changed per Radiologist protocol?     Answer:   Yes     Glycosylated Hemoglobin A1c     JIC LAV     JIC RED     Comprehensive Metabolic Panel     HM2(CBC w/o Differential)     Lipid Cascade     Order Specific Question:   Fasting is required?     Answer:   Unknown     Medications Discontinued During This Encounter   Medication Reason     hydroCHLOROthiazide (HYDRODIURIL) 50 MG tablet Duplicate order     lisinopril (PRINIVIL,ZESTRIL) 20 MG tablet Duplicate order     metFORMIN (GLUCOPHAGE) 1000 MG tablet Duplicate order     hydroCHLOROthiazide (HYDRODIURIL) 50 MG tablet Reorder     lisinopril (PRINIVIL,ZESTRIL) 20 MG tablet Reorder     metFORMIN (GLUCOPHAGE) 1000 MG tablet Reorder     potassium chloride (K-DUR,KLOR-CON) 20 MEQ tablet Reorder     simvastatin (ZOCOR) 10 MG tablet Reorder       Return in about 6 months (around 9/22/2019) for for a full review of everything we did today.      CHIEF COMPLAINT:  Chief Complaint   Patient presents with     Medication Management     Nevus     Left Nipple x 1 week       HISTORY OF PRESENT ILLNESS:  Berta is a 78 y.o. female presenting to the clinic today for a medication check and with complaints of dry eye and a concerning skin lesion.     Dry Eye: She has been experiencing dry eye, and it seems to be worse at night. She is using moisturizing drops that her daughter told her to try. This has been bothering her for the past three weeks to a month. Her eye may not bother her at all during the day but then she will notice it once she lies down.     Diabetes: She is taking metformin 1000 mg twice daily. Her hemoglobin A1c is 5.9% today, which is down slightly from 6.4% in November. She gets a yearly diabetic eye exam and will be due for her next in July.     Nevus: She has skin lesion on her left nipple that she would like looked at. She notes that she has a lot of moles on her body in general. She is  "not sure how long it has been there; she just recently noticed it.     Hypertension: She continues to take lisinopril 20 mg and hydrochlorothiazide 50 mg daily. She is taking two tablets of potassium chloride daily as well.     Hyperlipidemia: She continues to take simvastatin 10 mg daily.     Health Maintenance: She has had the Zostavax. She is trying to get the Shingrix vaccine now, but the pharmacy is out.     REVIEW OF SYSTEMS:   She has been feeling great. Her breathing has been fine. She sleeps well at night. All other systems are negative.    PFSH:  She stayed in [place] for the winter. She has family in [place] and would like to go south for the winter in the future.     TOBACCO USE:  Social History     Tobacco Use   Smoking Status Former Smoker   Smokeless Tobacco Never Used       VITALS:  Vitals:    03/22/19 1608   BP: 128/88   Patient Site: Left Arm   Patient Position: Sitting   Cuff Size: Adult Large   Pulse: 72   SpO2: 100%   Weight: 182 lb 4.8 oz (82.7 kg)   Height: 5' 6\" (1.676 m)     Wt Readings from Last 3 Encounters:   03/22/19 182 lb 4.8 oz (82.7 kg)   11/27/18 183 lb (83 kg)   07/17/18 181 lb (82.1 kg)     Body mass index is 29.42 kg/m .    PHYSICAL EXAM:  Constitutional:  Reveals an alert, pleasant, talkative female. Affect appropriate. Does not appear acutely ill.  Vitals:  Per nursing notes.  Cardiac:  Regular rate and rhythm without murmurs, rubs, or gallops. Legs without edema. Palpation of the radial pulse regular.  Skin: Numerous seborrheic keratoses on left breast, underneath, on areola, and one on tip of nipple. No suspicious or flat, pigmented lesions. All clearly seborrheic keratoses.   Neurologic:  Cranial nerves II-XII intact.     Psychiatric:  Mood appropriate, memory intact.     MEDICATIONS:  Current Outpatient Medications   Medication Sig Dispense Refill     ascorbic acid (ASCORBIC ACID WITH LONNY HIPS) 500 MG tablet Take 500 mg by mouth daily.       aspirin 81 MG EC tablet Take " 81 mg by mouth every evening.        calcium-vitamin D (CALCIUM-VITAMIN D) 500 mg(1,250mg) -200 unit per tablet Take 1 tablet by mouth 2 (two) times a day with meals.       cyanocobalamin (VITAMIN B-12) 250 MCG tablet Take 250 mcg by mouth daily.       hydroCHLOROthiazide (HYDRODIURIL) 50 MG tablet Take 1 tablet (50 mg total) by mouth daily. 90 tablet 3     KLOR-CON M20 20 mEq tablet TAKE 1 TABLET BY MOUTH 2 TIMES A DAY. 180 tablet 3     lisinopril (PRINIVIL,ZESTRIL) 20 MG tablet TAKE ONE TABLET BY MOUTH ONE TIME DAILY AT BEDTIME 90 tablet 3     metFORMIN (GLUCOPHAGE) 1000 MG tablet TAKE ONE TABLET BY MOUTH TWICE DAILY WITH MEALS 180 tablet 3     potassium chloride (K-DUR,KLOR-CON) 20 MEQ tablet Take 2 tablets (40 mEq total) by mouth daily. 180 tablet 3     simvastatin (ZOCOR) 10 MG tablet Take 1 tablet (10 mg total) by mouth at bedtime. 90 tablet 3     No current facility-administered medications for this visit.        ADDITIONAL HISTORY SUMMARIZED (2): None.  DECISION TO OBTAIN EXTRA INFORMATION (1): None.   RADIOLOGY TESTS (1): DXA ordered. Mammogram ordered.   LABS (1): Labs from 11/27/2018 reviewed. Labs ordered.   MEDICINE TESTS (1): None.  INDEPENDENT REVIEW (2 each): None.     The visit lasted a total of 21 minutes face to face with the patient. Over 50% of the time was spent counseling and educating the patient about her dry eye, medications, and nevus.    IShmuel, am scribing for and in the presence of, Dr. Blevins.    I, Dr. Blevins, personally performed the services described in this documentation, as scribed by Shmuel Dash in my presence, and it is both accurate and complete.    Total data points: 2

## 2021-05-26 NOTE — PATIENT INSTRUCTIONS - HE
Try prescriptions to [facility] mail order    Update blood tests    Shingles shot, get on the list    Diabetes is very good at 5.9    Seborrheic keratoses

## 2021-05-28 NOTE — TELEPHONE ENCOUNTER
Question following Office Visit  When did you see your provider: 3/22/2019  What is your question: Patient asking what the diagnosis of stage 3 Kidney disease means. Also heard there has been a Measles outbreak. Patient is asking if there is anything she should do. Please advise and call patient.  Okay to leave a detailed message: Yes

## 2021-05-28 NOTE — TELEPHONE ENCOUNTER
It is the official term for the decrease in kidney function that we have seen over the last 3 years.  It has been stable.  Nothing to worry about

## 2021-05-29 ENCOUNTER — RECORDS - HEALTHEAST (OUTPATIENT)
Dept: ADMINISTRATIVE | Facility: CLINIC | Age: 81
End: 2021-05-29

## 2021-05-29 NOTE — TELEPHONE ENCOUNTER
The simvastatin was renewed on 3/22/2019 for 90 tabs and three refills through [facility].  Please verify with patient that they have changed pharmacy

## 2021-05-30 ENCOUNTER — RECORDS - HEALTHEAST (OUTPATIENT)
Dept: ADMINISTRATIVE | Facility: CLINIC | Age: 81
End: 2021-05-30

## 2021-05-30 VITALS — WEIGHT: 186.4 LBS | BODY MASS INDEX: 29.96 KG/M2 | HEIGHT: 66 IN

## 2021-05-30 NOTE — TELEPHONE ENCOUNTER
Refill Approved  *Pharmacy Change    Rx renewed per Medication Renewal Policy. Medication was last renewed on 3/22/19  #90 R-3.    Last OV 3/22/19    Lissy Snyder, [facility] Triage/Med Refill 7/2/2019     Requested Prescriptions   Pending Prescriptions Disp Refills     simvastatin (ZOCOR) 10 MG tablet [Pharmacy Med Name: SIMVASTATIN 10 MG TABLET] 90 tablet 3     Sig: TAKE 1 TABLET BY MOUTH AT BEDTIME.       Statins Refill Protocol (Hmg CoA Reductase Inhibitors) Passed - 7/1/2019 12:01 PM        Passed - PCP or prescribing provider visit in past 12 months      Last office visit with prescriber/PCP: 3/22/2019 Gomez Blevins MD OR same dept: 3/22/2019 Gomez Blevins MD OR same specialty: 3/22/2019 Gomez Blevins MD  Last physical: Visit date not found Last MTM visit: Visit date not found   Next visit within 3 mo: Visit date not found  Next physical within 3 mo: Visit date not found  Prescriber OR PCP: Gomze Blevins MD  Last diagnosis associated with med order: There are no diagnoses linked to this encounter.  If protocol passes may refill for 12 months if within 3 months of last provider visit (or a total of 15 months).

## 2021-05-31 ENCOUNTER — RECORDS - HEALTHEAST (OUTPATIENT)
Dept: ADMINISTRATIVE | Facility: CLINIC | Age: 81
End: 2021-05-31

## 2021-05-31 VITALS — WEIGHT: 186 LBS | HEIGHT: 66 IN | BODY MASS INDEX: 29.89 KG/M2

## 2021-05-31 VITALS — HEIGHT: 66 IN | BODY MASS INDEX: 30.22 KG/M2 | WEIGHT: 188 LBS

## 2021-05-31 VITALS — BODY MASS INDEX: 30.76 KG/M2 | WEIGHT: 190.6 LBS

## 2021-05-31 VITALS — WEIGHT: 187 LBS | BODY MASS INDEX: 30.05 KG/M2 | HEIGHT: 66 IN

## 2021-05-31 VITALS — BODY MASS INDEX: 29.7 KG/M2 | WEIGHT: 184 LBS

## 2021-05-31 VITALS — BODY MASS INDEX: 30.07 KG/M2 | WEIGHT: 187.1 LBS | HEIGHT: 66 IN

## 2021-06-01 ENCOUNTER — RECORDS - HEALTHEAST (OUTPATIENT)
Dept: ADMINISTRATIVE | Facility: CLINIC | Age: 81
End: 2021-06-01

## 2021-06-01 VITALS — BODY MASS INDEX: 29.21 KG/M2 | WEIGHT: 181 LBS

## 2021-06-01 NOTE — TELEPHONE ENCOUNTER
Refill Request  Did you contact pharmacy: Yes  Medication name:   Requested Prescriptions      No prescriptions requested or ordered in this encounter     Who prescribed the medication: Gomez Blevins MD    Pharmacy Name and Location: [facility] # [id number]  Is patient out of medication: unknown  Patient notified refills processed in 72 hours:  no  Okay to leave a detailed message: yes    Pharmacy is requesting a 90 day supply if applicable

## 2021-06-01 NOTE — PROGRESS NOTES
ASSESSMENT:  1. Controlled type 2 diabetes mellitus with stage 3 chronic kidney disease, without long-term current use of insulin (H)  Stable.  Doing well.  Update labs  - Glycosylated Hemoglobin A1c  - Microalbumin, Random Urine  - Basic Metabolic Panel    2. Essential hypertension with goal blood pressure less than 140/90  Stable.  No edema    3. Pain of left thumb  Rule out uric acid arthropathy  - Uric Acid        PLAN:  Patient Instructions   Sometimes joint pain is from a combination of elevated uric acid and arthritis.  We check uric acid and if over 5.9, I recommend treatment for a month with Allopurinol.  If it helps, continue.  And if not, stop it    Update blood and urine    Everything else is up to date          Orders Placed This Encounter   Procedures     Uric Acid     Glycosylated Hemoglobin A1c     Microalbumin, Random Urine     Basic Metabolic Panel     Medications Discontinued During This Encounter   Medication Reason     simvastatin (ZOCOR) 10 MG tablet Duplicate order       Return in about 6 months (around 3/11/2020) for for a full review of everything we did today.    CHIEF COMPLAINT:  Chief Complaint   Patient presents with     Follow-up     Diabetes       HISTORY OF PRESENT ILLNESS:  Berta is a 79 y.o. female presenting to the clinic today for a review of her diabetes.  She is having no hypoglycemia.  She feels well    She complains of some mild pain and aching at the base of her left thumb.  She is right-handed.  She has no other arthralgias.  She has never had gout.  She does not bother taking Tylenol or Advil for this    Health maintenance including shots mammograms and colon cancer screening were reviewed    REVIEW OF SYSTEMS:   No other arthralgias.  No peripheral edema.  No chest pain or shortness of breath.  All other systems are negative.    PFSH:  She travels actively visiting with family    TOBACCO USE:  Social History     Tobacco Use   Smoking Status Never Smoker   Smokeless  Tobacco Never Used       VITALS:  Vitals:    09/11/19 1111   BP: 138/80   Patient Site: Left Arm   Patient Position: Sitting   Cuff Size: Adult Large   Pulse: 72   SpO2: 98%   Weight: 183 lb 5 oz (83.2 kg)     Wt Readings from Last 3 Encounters:   09/11/19 183 lb 5 oz (83.2 kg)   03/22/19 182 lb 4.8 oz (82.7 kg)   11/27/18 183 lb (83 kg)     Body mass index is 29.59 kg/m .    PHYSICAL EXAM:  Constitutional:  Reveals a pleasant interactive elderly black woman.  Vitals:  Per nursing notes.    Cardiac: Regular rate and rhythm without murmurs, rubs, or gallops.     Legs without edema  Palpation of the radial pulse regular.  Lungs: Clear.  Respiratory effort normal.    Neurologic:  Cranial nerves II-XII intact.     Psychiatric:  Mood appropriate, memory intact.       ADDITIONAL HISTORY SUMMARIZED (2):   CARE EVERYWHERE/ EXTRA INFORMATION (1): None.   RADIOLOGY TESTS (1):   LABS (1): Update today  CARDIOLOGY/MEDICINE TESTS (1):   INDEPENDENT REVIEW (2 each): None.     Total data points:1    Time in: 1125 am  Time out: 11:39 AM    The visit lasted a total of 14 minutes face to face with the patient. Over 50% of the time was spent counseling and educating the patient about medications, medication adjustments, medication side effects, and lab testing.        MEDICATIONS:  Current Outpatient Medications   Medication Sig Dispense Refill     ascorbic acid (ASCORBIC ACID WITH LONNY HIPS) 500 MG tablet Take 500 mg by mouth daily.       aspirin 81 MG EC tablet Take 81 mg by mouth every evening.        calcium-vitamin D (CALCIUM-VITAMIN D) 500 mg(1,250mg) -200 unit per tablet Take 1 tablet by mouth 2 (two) times a day with meals.       cyanocobalamin (VITAMIN B-12) 250 MCG tablet Take 250 mcg by mouth daily.       hydroCHLOROthiazide (HYDRODIURIL) 50 MG tablet Take 1 tablet (50 mg total) by mouth daily. 90 tablet 3     KLOR-CON M20 20 mEq tablet TAKE 1 TABLET BY MOUTH 2 TIMES A DAY. 180 tablet 3     lisinopril (PRINIVIL,ZESTRIL) 20  MG tablet TAKE ONE TABLET BY MOUTH ONE TIME DAILY AT BEDTIME 90 tablet 3     metFORMIN (GLUCOPHAGE) 1000 MG tablet TAKE ONE TABLET BY MOUTH TWICE DAILY WITH MEALS 180 tablet 3     potassium chloride (K-DUR,KLOR-CON) 20 MEQ tablet Take 2 tablets (40 mEq total) by mouth daily. 180 tablet 3     simvastatin (ZOCOR) 10 MG tablet Take 1 tablet (10 mg total) by mouth at bedtime. 90 tablet 3     No current facility-administered medications for this visit.

## 2021-06-01 NOTE — TELEPHONE ENCOUNTER
RN cannot approve Refill Request    RN can NOT refill this medication Protocol failed and NO refill given. Last office visit: 9/11/2019 Gomez Blevins MD Last Physical: Visit date not found Last MTM visit: Visit date not found Last visit same specialty: 9/11/2019 Gomez Blevins MD.  Next visit within 3 mo: Visit date not found  Next physical within 3 mo: Visit date not found      Jennifer Cain, [facility] Triage/Med Refill 9/28/2019    Requested Prescriptions   Pending Prescriptions Disp Refills     metFORMIN (GLUCOPHAGE) 1000 MG tablet [Pharmacy Med Name: METFORMIN HCL 1,000 MG TABLET] 180 tablet 2     Sig: TAKE ONE TABLET BY MOUTH TWICE DAILY WITH MEALS       Metformin Refill Protocol Failed - 9/27/2019 12:57 PM        Failed - GFR or Serum Creatinine in last 6 months     GFR MDRD Non Af Amer   Date Value Ref Range Status   09/11/2019 35 (L) >60 mL/min/1.73m2 Final     GFR MDRD Af Amer   Date Value Ref Range Status   09/11/2019 42 (L) >60 mL/min/1.73m2 Final             Passed - Blood pressure in last 12 months     BP Readings from Last 1 Encounters:   09/11/19 138/80             Passed - LFT or AST or ALT in last 12 months     Albumin   Date Value Ref Range Status   03/22/2019 4.4 3.5 - 5.0 g/dL Final     Bilirubin, Total   Date Value Ref Range Status   03/22/2019 0.6 0.0 - 1.0 mg/dL Final     Bilirubin, Direct   Date Value Ref Range Status   07/07/2010 0.12 <0.31 mg/dL Final     Alkaline Phosphatase   Date Value Ref Range Status   03/22/2019 52 45 - 120 U/L Final     AST   Date Value Ref Range Status   03/22/2019 20 0 - 40 U/L Final     ALT   Date Value Ref Range Status   03/22/2019 12 0 - 45 U/L Final     Protein, Total   Date Value Ref Range Status   03/22/2019 6.8 6.0 - 8.0 g/dL Final                Passed - Visit with PCP or prescribing provider visit in last 6 months or next 3 months     Last office visit with prescriber/PCP: 9/11/2019 OR same dept: 9/11/2019 Gomez Blevins MD OR  same specialty: 9/11/2019 Gomez Blevins MD Last physical: Visit date not found Last MTM visit: Visit date not found         Next appt within 3 mo: Visit date not found  Next physical within 3 mo: Visit date not found  Prescriber OR PCP: Gomez Blevins MD  Last diagnosis associated with med order: 1. DM II (diabetes mellitus, type II), controlled (H)  - metFORMIN (GLUCOPHAGE) 1000 MG tablet [Pharmacy Med Name: METFORMIN HCL 1,000 MG TABLET]; TAKE ONE TABLET BY MOUTH TWICE DAILY WITH MEALS  Dispense: 180 tablet; Refill: 2     If protocol passes may refill for 12 months if within 3 months of last provider visit (or a total of 15 months).           Passed - A1C in last 6 months     Hemoglobin A1c   Date Value Ref Range Status   09/11/2019 6.4 (H) 3.5 - 6.0 % Final               Passed - Microalbumin in last year      Microalbumin, Random Urine   Date Value Ref Range Status   09/11/2019 <0.50 0.00 - 1.99 mg/dL Final

## 2021-06-01 NOTE — PATIENT INSTRUCTIONS - HE
Sometimes joint pain is from a combination of elevated uric acid and arthritis.  We check uric acid and if over 5.9, I recommend treatment for a month with Allopurinol.  If it helps, continue.  And if not, stop it    Update blood and urine    Everything else is up to date

## 2021-06-02 ENCOUNTER — RECORDS - HEALTHEAST (OUTPATIENT)
Dept: ADMINISTRATIVE | Facility: CLINIC | Age: 81
End: 2021-06-02

## 2021-06-02 VITALS — WEIGHT: 183 LBS | BODY MASS INDEX: 29.41 KG/M2 | HEIGHT: 66 IN

## 2021-06-02 VITALS — HEIGHT: 66 IN | WEIGHT: 182.3 LBS | BODY MASS INDEX: 29.3 KG/M2

## 2021-06-03 ENCOUNTER — RECORDS - HEALTHEAST (OUTPATIENT)
Dept: ADMINISTRATIVE | Facility: CLINIC | Age: 81
End: 2021-06-03

## 2021-06-03 VITALS
HEART RATE: 72 BPM | WEIGHT: 183.31 LBS | BODY MASS INDEX: 29.59 KG/M2 | OXYGEN SATURATION: 98 % | SYSTOLIC BLOOD PRESSURE: 138 MMHG | DIASTOLIC BLOOD PRESSURE: 80 MMHG

## 2021-06-03 NOTE — TELEPHONE ENCOUNTER
Refill Approved    Rx renewed per Medication Renewal Policy. Medication was last renewed on 3/22/2019       Manuel Faria, [facility] Triage/Med Refill 12/3/2019     Requested Prescriptions   Pending Prescriptions Disp Refills     KLOR-CON M20 20 mEq tablet [Pharmacy Med Name: KLOR-CON M20 TABLET] 180 tablet 3     Sig: TAKE 1 TABLET BY MOUTH 2 TIMES A DAY.       Potassium Supplements Refill Protocol Passed - 12/3/2019  1:57 AM        Passed - PCP or prescribing provider visit in past 12 months       Last office visit with prescriber/PCP: 9/11/2019 Gomez Blevins MD OR same dept: 9/11/2019 Gomez Blevins MD OR same specialty: 9/11/2019 Gomez Blevins MD  Last physical: Visit date not found Last MTM visit: Visit date not found   Next visit within 3 mo: Visit date not found  Next physical within 3 mo: Visit date not found  Prescriber OR PCP: Gomez Blevins MD  Last diagnosis associated with med order: 1. Hypokalemia  - KLOR-CON M20 20 mEq tablet [Pharmacy Med Name: KLOR-CON M20 TABLET]; TAKE 1 TABLET BY MOUTH 2 TIMES A DAY.  Dispense: 180 tablet; Refill: 3    If protocol passes may refill for 12 months if within 3 months of last provider visit (or a total of 15 months).             Passed - Potassium level in last 12 months     Lab Results   Component Value Date    Potassium 3.9 09/11/2019

## 2021-06-03 NOTE — TELEPHONE ENCOUNTER
Refill requested too soon.  Lisinopril filled 3/22/19 #90 tablets with 3 refills.    Katelyn Comer, TATIANA  Triage Nurse Advisor

## 2021-06-04 VITALS
HEIGHT: 66 IN | OXYGEN SATURATION: 97 % | BODY MASS INDEX: 31.14 KG/M2 | HEART RATE: 84 BPM | DIASTOLIC BLOOD PRESSURE: 68 MMHG | SYSTOLIC BLOOD PRESSURE: 130 MMHG | RESPIRATION RATE: 16 BRPM | WEIGHT: 193.75 LBS

## 2021-06-04 NOTE — TELEPHONE ENCOUNTER
Refill Approved    Rx renewed per Medication Renewal Policy. Medication was last renewed on 3/22/2019.  Last OV 9/11/2019.    Loyda Valencia, [facility] Connection Triage/Med Refill 12/13/2019     Requested Prescriptions   Pending Prescriptions Disp Refills     lisinopril (PRINIVIL,ZESTRIL) 20 MG tablet [Pharmacy Med Name: LISINOPRIL 20 MG TABLET] 90 tablet 2     Sig: TAKE ONE TABLET BY MOUTH ONE TIME DAILY AT BEDTIME       Ace Inhibitors Refill Protocol Passed - 12/11/2019 10:10 AM        Passed - PCP or prescribing provider visit in past 12 months       Last office visit with prescriber/PCP: 9/11/2019 Gomez Blevins MD OR same dept: 9/11/2019 Gomez Blevins MD OR same specialty: 9/11/2019 Gomez Blevins MD  Last physical: Visit date not found Last MTM visit: Visit date not found   Next visit within 3 mo: Visit date not found  Next physical within 3 mo: Visit date not found  Prescriber OR PCP: Gomez Blevins MD  Last diagnosis associated with med order: 1. Essential hypertension with goal blood pressure less than 140/90  - lisinopril (PRINIVIL,ZESTRIL) 20 MG tablet [Pharmacy Med Name: LISINOPRIL 20 MG TABLET]; TAKE ONE TABLET BY MOUTH ONE TIME DAILY AT BEDTIME  Dispense: 90 tablet; Refill: 2    If protocol passes may refill for 12 months if within 3 months of last provider visit (or a total of 15 months).             Passed - Serum Potassium in past 12 months     Lab Results   Component Value Date    Potassium 3.9 09/11/2019             Passed - Blood pressure filed in past 12 months     BP Readings from Last 1 Encounters:   09/11/19 138/80             Passed - Serum Creatinine in past 12 months     Creatinine   Date Value Ref Range Status   09/11/2019 1.45 (H) 0.60 - 1.10 mg/dL Final

## 2021-06-05 NOTE — TELEPHONE ENCOUNTER
Refill Approved    Rx renewed per Medication Renewal Policy. Medication was last renewed on 3/22/19.    Sonja Matt, [facility] Connection Triage/Med Refill 1/31/2020     Requested Prescriptions   Pending Prescriptions Disp Refills     hydroCHLOROthiazide (HYDRODIURIL) 50 MG tablet [Pharmacy Med Name: HYDROCHLOROTHIAZIDE 50 MG TAB] 90 tablet 2     Sig: TAKE 1 TABLET BY MOUTH DAILY.       Diuretics/Combination Diuretics Refill Protocol  Passed - 1/31/2020  2:01 AM        Passed - Visit with PCP or prescribing provider visit in past 12 months     Last office visit with prescriber/PCP: 9/11/2019 Gomez Blevins MD OR same dept: 9/11/2019 Gomez Blevins MD OR same specialty: 9/11/2019 Gomez Blevins MD  Last physical: Visit date not found Last MTM visit: Visit date not found   Next visit within 3 mo: Visit date not found  Next physical within 3 mo: Visit date not found  Prescriber OR PCP: Gomez Blevins MD  Last diagnosis associated with med order: 1. Essential hypertension with goal blood pressure less than 140/90  - hydroCHLOROthiazide (HYDRODIURIL) 50 MG tablet [Pharmacy Med Name: HYDROCHLOROTHIAZIDE 50 MG TAB]; TAKE 1 TABLET BY MOUTH DAILY.  Dispense: 90 tablet; Refill: 2    If protocol passes may refill for 12 months if within 3 months of last provider visit (or a total of 15 months).             Passed - Serum Potassium in past 12 months      Lab Results   Component Value Date    Potassium 3.9 09/11/2019             Passed - Serum Sodium in past 12 months      Lab Results   Component Value Date    Sodium 143 09/11/2019             Passed - Blood pressure on file in past 12 months     BP Readings from Last 1 Encounters:   09/11/19 138/80             Passed - Serum Creatinine in past 12 months      Creatinine   Date Value Ref Range Status   09/11/2019 1.45 (H) 0.60 - 1.10 mg/dL Final

## 2021-06-06 NOTE — TELEPHONE ENCOUNTER
Refill Approved    Rx renewed per Medication Renewal Policy. Medication was last renewed on 3/22/19.    Sonja Matt, [facility] Triage/Med Refill 3/8/2020     Requested Prescriptions   Pending Prescriptions Disp Refills     simvastatin (ZOCOR) 10 MG tablet [Pharmacy Med Name: SIMVASTATIN 10 MG TABLET] 90 tablet 2     Sig: TAKE 1 TABLET BY MOUTH AT BEDTIME.       Statins Refill Protocol (Hmg CoA Reductase Inhibitors) Passed - 3/6/2020 12:45 PM        Passed - PCP or prescribing provider visit in past 12 months      Last office visit with prescriber/PCP: 9/11/2019 Gomez Blevins MD OR same dept: 9/11/2019 Gomez Blevins MD OR same specialty: 9/11/2019 Gomez Blevins MD  Last physical: Visit date not found Last MTM visit: Visit date not found   Next visit within 3 mo: Visit date not found  Next physical within 3 mo: Visit date not found  Prescriber OR PCP: Gomez Blevins MD  Last diagnosis associated with med order: 1. Controlled type 2 diabetes mellitus with stage 3 chronic kidney disease, without long-term current use of insulin (H)  - simvastatin (ZOCOR) 10 MG tablet [Pharmacy Med Name: SIMVASTATIN 10 MG TABLET]; TAKE 1 TABLET BY MOUTH AT BEDTIME.  Dispense: 90 tablet; Refill: 2    If protocol passes may refill for 12 months if within 3 months of last provider visit (or a total of 15 months).

## 2021-06-07 NOTE — TELEPHONE ENCOUNTER
Refill Request  Did you contact pharmacy: Yes  Medication name:   Requested Prescriptions     Pending Prescriptions Disp Refills     potassium chloride (KLOR-CON M20) 20 MEQ tablet 180 tablet 3     Sig: Take 1 tablet (20 mEq total) by mouth 2 (two) times a day.     Who prescribed the medication: Gomez Blevins MD   Requested Pharmacy: [facility]  Is patient out of medication: Yes  Patient notified refills processed in 3 business days:  no  Okay to leave a detailed message: yes    FYI: Patient stated that she is out of her medication. Patient stated that she would like prescription for 2 weeks supply sent to local pharmacy [facility][id number] and 3 month supply for [facility].

## 2021-06-07 NOTE — TELEPHONE ENCOUNTER
Medication Question or Clarification  Who is calling: Patient  What medication are you calling about (include dose and sig)?:   simvastatin (ZOCOR) 10 MG tablet  90 tablet  1  3/8/2020      Sig - Route: Take 1 tablet (10 mg total) by mouth at bedtime. - Oral     Sent to pharmacy as: simvastatin 10 mg tablet (ZOCOR)     E-Prescribing Status: Receipt confirmed by pharmacy (3/8/2020  7:47 AM CDT        Who prescribed the medication?: Gomez Blevins MD  What is your question/concern?: The [facility] is [facility], is [facility], and will also need a bridge prescription sent to the [facility] pharmacy locally # [id number], ASAP please as has 1 dose left.  Requested Pharmacy: [facility]   AND  Bridge prescription to [facility] # [id number]  Okay to leave a detailed message?: Yes

## 2021-06-07 NOTE — TELEPHONE ENCOUNTER
Who is calling:  Patient   Reason for Call:  Patient stated that she need the prescription for 3 month supply sent to [facility] as well. Patient stated that she did  the prescription from [facility] #[id number] but did not get the prescription for [facility]. Please review and call patient back.  Date of last appointment with primary care: n/a  Okay to leave a detailed message: Yes  [phone]

## 2021-06-07 NOTE — TELEPHONE ENCOUNTER
Refill Approved    Rx renewed per Medication Renewal Policy. Medication was last renewed on 12/13/19. Patient switched pharmacies to mail order. Patient also requested a 14 day supply sent to a local pharmacy as she was out of medication.     Debbie Silverman, [facility] Triage/Med Refill 4/21/2020     Requested Prescriptions   Pending Prescriptions Disp Refills     potassium chloride (KLOR-CON M20) 20 MEQ tablet 180 tablet 3     Sig: Take 1 tablet (20 mEq total) by mouth 2 (two) times a day.       Potassium Supplements Refill Protocol Passed - 4/20/2020  4:12 PM        Passed - PCP or prescribing provider visit in past 12 months       Last office visit with prescriber/PCP: 9/11/2019 Gomez Blevins MD OR same dept: 9/11/2019 Gomez Blevins MD OR same specialty: 9/11/2019 Gomez Blevins MD  Last physical: Visit date not found Last MTM visit: Visit date not found   Next visit within 3 mo: Visit date not found  Next physical within 3 mo: Visit date not found  Prescriber OR PCP: Gomez Blevins MD  Last diagnosis associated with med order: 1. Hypokalemia  - potassium chloride (KLOR-CON M20) 20 MEQ tablet; Take 1 tablet (20 mEq total) by mouth 2 (two) times a day.  Dispense: 180 tablet; Refill: 3    If protocol passes may refill for 12 months if within 3 months of last provider visit (or a total of 15 months).             Passed - Potassium level in last 12 months     Lab Results   Component Value Date    Potassium 3.9 09/11/2019

## 2021-06-08 NOTE — TELEPHONE ENCOUNTER
RN cannot approve Refill Request    RN can NOT refill this medication Protocol failed and NO refill given.       Avelina Mccall, [facility] Triage/Med Refill 6/15/2020    Requested Prescriptions   Pending Prescriptions Disp Refills     metFORMIN (GLUCOPHAGE) 1000 MG tablet [Pharmacy Med Name: METFORMIN HCL 1,000 MG TABLET] 180 tablet 2     Sig: TAKE ONE TABLET BY MOUTH TWICE DAILY WITH MEALS       Metformin Refill Protocol Failed - 6/14/2020  9:13 AM        Failed - LFT or AST or ALT in last 12 months     Albumin   Date Value Ref Range Status   03/22/2019 4.4 3.5 - 5.0 g/dL Final     Bilirubin, Total   Date Value Ref Range Status   03/22/2019 0.6 0.0 - 1.0 mg/dL Final     Bilirubin, Direct   Date Value Ref Range Status   07/07/2010 0.12 <0.31 mg/dL Final     Alkaline Phosphatase   Date Value Ref Range Status   03/22/2019 52 45 - 120 U/L Final     AST   Date Value Ref Range Status   03/22/2019 20 0 - 40 U/L Final     ALT   Date Value Ref Range Status   03/22/2019 12 0 - 45 U/L Final     Protein, Total   Date Value Ref Range Status   03/22/2019 6.8 6.0 - 8.0 g/dL Final                Failed - GFR or Serum Creatinine in last 6 months     GFR MDRD Non Af Amer   Date Value Ref Range Status   09/11/2019 35 (L) >60 mL/min/1.73m2 Final     GFR MDRD Af Amer   Date Value Ref Range Status   09/11/2019 42 (L) >60 mL/min/1.73m2 Final             Failed - Visit with PCP or prescribing provider visit in last 6 months or next 3 months     Last office visit with prescriber/PCP: Visit date not found OR same dept: 9/11/2019 Gomez Blevins MD OR same specialty: 9/11/2019 Gomez Blevins MD Last physical: Visit date not found Last MTM visit: Visit date not found         Next appt within 3 mo: Visit date not found  Next physical within 3 mo: Visit date not found  Prescriber OR PCP: Gomez Blevins MD  Last diagnosis associated with med order: 1. DM II (diabetes mellitus, type II), controlled (H)  - metFORMIN  (GLUCOPHAGE) 1000 MG tablet [Pharmacy Med Name: METFORMIN HCL 1,000 MG TABLET]; TAKE ONE TABLET BY MOUTH TWICE DAILY WITH MEALS  Dispense: 180 tablet; Refill: 2     If protocol passes may refill for 12 months if within 3 months of last provider visit (or a total of 15 months).           Failed - A1C in last 6 months     Hemoglobin A1c   Date Value Ref Range Status   09/11/2019 6.4 (H) 3.5 - 6.0 % Final               Passed - Blood pressure in last 12 months     BP Readings from Last 1 Encounters:   09/11/19 138/80             Passed - Microalbumin in last year      Microalbumin, Random Urine   Date Value Ref Range Status   09/11/2019 <0.50 0.00 - 1.99 mg/dL Final

## 2021-06-08 NOTE — PROGRESS NOTES
ASSESSMENT:  1. Essential hypertension with goal blood pressure less than 140/90  Marked improvement in edema but worsening blood pressure by today's readings.  No interval home reading.  Discussed options.  Heart rate usually fairly well controlled.  Trial of increased hydrochlorothiazide with careful attention to renal function     I ask her if any other family members take medication for blood pressure.  She researched this, and call back after her office visit reporting that her sister takes carvedilol, felodipine, and Benicar/amlodipine  - Basic Metabolic Panel  - Microalbumin, Random Urine  - hydroCHLOROthiazide (HYDRODIURIL) 50 MG tablet; Take 1 tablet (50 mg total) by mouth daily.  Dispense: 90 tablet; Refill: 3    2. Controlled type 2 diabetes mellitus without complication, without long-term current use of insulin  Stable.  Continue medication.  Check urine with coexisting hypertension  - Glycosylated Hemoglobin A1c  - Microalbumin, Random Urine    3. Spinal stenosis, lumbar region, with neurogenic claudication  Update bone density  - DXA Bone Density Scan; Future    Back pain.  Markedly improved off simvastatin.  Retry at lower dose    PLAN:  Patient Instructions   Do not eat your grapefruit and take your simvastatin at the same time.  They need to be spaced at least 2 hours apart    Resume the simvastatin but take one half pill until you are done, then the next prescription will be for 10 mgs.  Let me know if you develop pain again    The improvement in your leg swelling was because we stopped the nifedipine and added HCTZ    Update the blood tests    Goal blood pressure is below 140 over 90, and no higher than 150 over 90, but no lower than 100 over 60.  Ideally 120-130    Increase the HCTZ to 50 mgs.  Finish what you have taking 2 per time    Due for bone density          Orders Placed This Encounter   Procedures     Basic Metabolic Panel     Glycosylated Hemoglobin A1c     Microalbumin, Random  Urine     Medications Discontinued During This Encounter   Medication Reason     simvastatin (ZOCOR) 20 MG tablet Therapy completed     hydroCHLOROthiazide (HYDRODIURIL) 25 MG tablet Reorder       Return in about 3 months (around 5/2/2017).    CHIEF COMPLAINT:  Chief Complaint   Patient presents with     Diabetes     Follow-up       HISTORY OF PRESENT ILLNESS:  Berta is a 76 y.o. female presenting to the clinic today for follow up for diabetes.     Type 2 Diabetes: She continues on metformin 1000mg twice daily with meals. Her A1c on 10/20/16 was 6.4.     Hyperlipidemia: She has discontinued simvastatin as instructed at her last visit and feels well. Her previously reported right thigh pain has resolved. She would like to eat grapefruit, which she loves, and wonders if she can eat it now. She likes to eat grapefruit in the evening.     Hypertension: Her blood pressure today is 160/80 and 144/80. She has had high blood pressure for many years. At her last visit on 10/20/16 hydrochlorothiazide was initiated and nifedipine stopped to help with foot swelling. She has not had any swelling; this resolved very soon after these medication changes. She cannot tell when her BP is high. She does not monitor her BP at home. She denies dizziness or lightheadedness.     Health Maintenance: She is due for DXA. Her last was in 2009 and was normal.     REVIEW OF SYSTEMS:   She denies cough. She gets regular eye exams. Her most recent exam was normal. She is trying to walk for 30 minutes a few times per week. She denies being overly warm or cold, heart racing, or numb or cold fingertips. All other systems are negative.    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     TOBACCO USE:  History   Smoking Status     Former Smoker   Smokeless Tobacco     Not on file       VITALS:  Vitals:    02/02/17 1023 02/02/17 1027 02/02/17 1140   BP: 160/80 144/80 170/82   Patient Site: Left Arm  Left Arm   Patient Position: Sitting   "Sitting   Cuff Size: Adult Large     Pulse: 72     Resp: 20     Weight: 186 lb 6.4 oz (84.6 kg)     Height: 5' 6\" (1.676 m)       Wt Readings from Last 3 Encounters:   02/02/17 186 lb 6.4 oz (84.6 kg)   10/20/16 188 lb (85.3 kg)   07/20/16 183 lb (83 kg)     Body mass index is 30.09 kg/(m^2).    PHYSICAL EXAM:  Constitutional:  Reveals a pleasant soft-spoken healthy-appearing woman.  Vitals:  Per nursing notes.  Repeat BP by MD: 170/82, left arm, sitting.   Cardiac:  Regular rate and rhythm without murmurs, rubs, or gallops. Legs without edema. Palpation of the radial pulse regular.  Skin:   Without rash, bruise, or palpable lesions.  Rheumatologic: Normal joints and nails of the hands.  Neurologic:  Cranial nerves II-XII intact.     Psychiatric:  Mood appropriate, memory intact.     ADDITIONAL HISTORY SUMMARIZED (2): None.  DECISION TO OBTAIN EXTRA INFORMATION (1): None.   RADIOLOGY TESTS (1): DXA ordered.   LABS (1): Labs ordered.  MEDICINE TESTS (1): None.  INDEPENDENT REVIEW (2 each): None.     The visit lasted a total of 24 minutes face to face with the patient. Over 50% of the time was spent counseling and educating the patient about hypertension.    IEugenio, am scribing for and in the presence of, Dr. Blevins.    I, Dr. Blevins, personally performed the services described in this documentation, as scribed by Eugenio Lundy in my presence, and it is both accurate and complete.    MEDICATIONS:  Current Outpatient Prescriptions   Medication Sig Dispense Refill     ascorbic acid (ASCORBIC ACID WITH LONNY HIPS) 500 MG tablet Take 500 mg by mouth daily.       aspirin 81 MG EC tablet Take 81 mg by mouth every evening.        calcium-vitamin D (CALCIUM-VITAMIN D) 500 mg(1,250mg) -200 unit per tablet Take 1 tablet by mouth 2 (two) times a day with meals.       hydroCHLOROthiazide (HYDRODIURIL) 50 MG tablet Take 1 tablet (50 mg total) by mouth daily. 90 tablet 3     lisinopril (PRINIVIL,ZESTRIL) 20 MG tablet " TAKE ONE TABLET BY MOUTH NIGHTLY AT BEDTIME 90 tablet 3     metFORMIN (GLUCOPHAGE) 1000 MG tablet TAKE ONE TABLET BY MOUTH TWICE DAILY WITH MEALS 180 tablet 3     simvastatin (ZOCOR) 10 MG tablet Take 1 tablet (10 mg total) by mouth bedtime. 90 tablet 3     No current facility-administered medications for this visit.        Total data points: 2

## 2021-06-08 NOTE — PROGRESS NOTES
Subjective findings: The patient returns to the clinic today for continued diabetic foot care complaining of long thick painful nails both feet.    Objective findings: Nails bilateral feet are elongated and 2-3 times normal thickness. Skin bilaterally warm and intact.  DP and PT pulses +1 over 4 bilateral feet. Capillary refill less than 2 seconds bilateral feet.  Negative clonus, negative Babinski bilateral feet.   Range of motion within normal limits. Muscle power +5 over 5 within all compartments.      Assessment: Onychomycosis, onychauxis, diabetes mellitus      Plan: Debrided  nails bilateral feet. I recommend he return to the clinic in 2 months for continued diabetic footcare.

## 2021-06-09 NOTE — PROGRESS NOTES
ASSESSMENT:  1. Controlled type 2 diabetes mellitus with stage 3 chronic kidney disease, without long-term current use of insulin (H)  Stable.  Update labs.  Update Pneumovax  - Glycosylated Hemoglobin A1c  - Lipid Cascade FASTING  - Microalbumin, Random Urine  - metFORMIN (GLUCOPHAGE) 1000 MG tablet; TAKE ONE TABLET BY MOUTH TWICE DAILY WITH MEALS  Dispense: 180 tablet; Refill: 3  - simvastatin (ZOCOR) 10 MG tablet; Take 1 tablet (10 mg total) by mouth at bedtime.  Dispense: 90 tablet; Refill: 3  - Comprehensive Metabolic Panel  - Pneumococcal polysaccharide vaccine 23-valent greater than or equal to 3yo subcutaneous/IM    2. Essential hypertension with goal blood pressure less than 140/90  Stable and doing well  - hydroCHLOROthiazide (HYDRODIURIL) 50 MG tablet; Take 1 tablet (50 mg total) by mouth daily.  Dispense: 90 tablet; Refill: 1  - lisinopriL (PRINIVIL,ZESTRIL) 20 MG tablet; Take 1 tablet (20 mg total) by mouth at bedtime.  Dispense: 90 tablet; Refill: 3  - Comprehensive Metabolic Panel    3. Hypokalemia  Clarify dosage  - KLOR-CON M20 20 mEq tablet; Take 2 tablets (40 mEq total) by mouth daily.  Dispense: 180 tablet; Refill: 3    4. Weight gain  Possibly due to mismatch of diet and exercise.  Discussed weight loss medication.  Rule out hypothyroidism  - Thyroid Stimulating Hormone (TSH)    5. Encounter for immunization   - Pneumococcal polysaccharide vaccine 23-valent greater than or equal to 3yo subcutaneous/IM    6. Visit for screening mammogram  - Mammo Screening Bilateral; Future    7.  Preventive maintenance.  Reviewed mammogram, colon cancer, and vaccinations.      PLAN:  Patient Instructions   Refill meds    Update blood tests    Update mammogram    No need for colon, last was 2014, normal    Update pneumonia booster          Orders Placed This Encounter   Procedures     Mammo Screening Bilateral     Standing Status:   Future     Standing Expiration Date:   10/14/2021     Order Specific Question:    Patient's previous breast density:     Answer:   Scattered fibroglandular density [2]     Order Specific Question:   Can the procedure be changed per Radiologist protocol?     Answer:   Yes     Pneumococcal polysaccharide vaccine 23-valent greater than or equal to 1yo subcutaneous/IM     Glycosylated Hemoglobin A1c     Lipid Cascade FASTING     Order Specific Question:   Fasting is required?     Answer:   Yes     Microalbumin, Random Urine     Thyroid Stimulating Hormone (TSH)     Comprehensive Metabolic Panel     Medications Discontinued During This Encounter   Medication Reason     simvastatin (ZOCOR) 10 MG tablet Duplicate order     metFORMIN (GLUCOPHAGE) 1000 MG tablet Duplicate order     lisinopril (PRINIVIL,ZESTRIL) 20 MG tablet Duplicate order     hydroCHLOROthiazide (HYDRODIURIL) 50 MG tablet Reorder     KLOR-CON M20 20 mEq tablet Reorder     lisinopriL (PRINIVIL,ZESTRIL) 20 MG tablet Reorder     metFORMIN (GLUCOPHAGE) 1000 MG tablet Reorder     simvastatin (ZOCOR) 10 MG tablet Reorder     allopurinol (ZYLOPRIM) 300 MG tablet Therapy completed       Return in about 6 months (around 1/15/2021) for a phone/video follow up visit.    CHIEF COMPLAINT:  Chief Complaint   Patient presents with     Diabetes     Hypertension       HISTORY OF PRESENT ILLNESS:  Berta is a 79 y.o. female presenting to the clinic today to review diabetes.  She reports overall feeling fairly well    She is frustrated that she is gaining weight.  She reports eating more with minimal exercise.  She thinks her mood is stable.  She is not interested in antidepressants    When last seen in September she was complaining of thumb pain.  A uric acid level was greater than 6 and she was advised to trial of allopurinol.  She took this for 1 month without significant benefit, and quit.  She has had no recurrence of thumb pain    REVIEW OF SYSTEMS:   Minimal arthritis.  Stable mood.  Increased stress.  All other systems are negative.    PFSH:  Lives  "with many family members that cause some increased stress in her life    TOBACCO USE:  Social History     Tobacco Use   Smoking Status Never Smoker   Smokeless Tobacco Never Used       VITALS:  Vitals:    07/15/20 1544   BP: 130/68   Patient Site: Left Arm   Patient Position: Sitting   Cuff Size: Adult Large   Pulse: 84   Resp: 16   SpO2: 97%   Weight: 193 lb 12 oz (87.9 kg)   Height: 5' 6\" (1.676 m)     Wt Readings from Last 3 Encounters:   07/15/20 193 lb 12 oz (87.9 kg)   09/11/19 183 lb 5 oz (83.2 kg)   03/22/19 182 lb 4.8 oz (82.7 kg)     Body mass index is 31.27 kg/m .    PHYSICAL EXAM:  Constitutional:  Reveals a pleasant alert black woman wearing a mask.  Vitals:  Per nursing notes.    Cardiac: Regular rate and rhythm without murmurs, rubs, or gallops.     Legs no edema  Palpation of the radial pulse regular.  Lungs: Clear.  Respiratory effort normal.    Neurologic:  Cranial nerves II-XII intact.     Psychiatric:  Mood appropriate, memory intact.       ADDITIONAL HISTORY SUMMARIZED (2): Reviewed previous office notes and email regarding trial of allopurinol  CARE EVERYWHERE/ EXTRA INFORMATION (1): None.   RADIOLOGY TESTS (1): Mammograms entered  LABS (1): Labs ordered  CARDIOLOGY/MEDICINE TESTS (1):   INDEPENDENT REVIEW (2 each): None.     Total data points:4    Time in: 4:14 PM  Time out: 4:40 PM    The visit lasted a total of 26 minutes face to face with the patient. Over 50% of the time was spent counseling and educating the patient about medications, medication adjustments, medication side effects, and lab testing.        MEDICATIONS:  Current Outpatient Medications   Medication Sig Dispense Refill     ascorbic acid (ASCORBIC ACID WITH LONNY HIPS) 500 MG tablet Take 500 mg by mouth daily.       aspirin 81 MG EC tablet Take 81 mg by mouth every evening.        calcium-vitamin D (CALCIUM-VITAMIN D) 500 mg(1,250mg) -200 unit per tablet Take 1 tablet by mouth 2 (two) times a day with meals.       " cyanocobalamin (VITAMIN B-12) 250 MCG tablet Take 250 mcg by mouth daily.       ferrous gluconate (FERGON) 324 MG tablet Take 1 tablet (324 mg total) by mouth 2 (two) times a day with meals. 100 tablet 2     hydroCHLOROthiazide (HYDRODIURIL) 50 MG tablet Take 1 tablet (50 mg total) by mouth daily. 90 tablet 1     KLOR-CON M20 20 mEq tablet Take 2 tablets (40 mEq total) by mouth daily. 180 tablet 3     lisinopriL (PRINIVIL,ZESTRIL) 20 MG tablet Take 1 tablet (20 mg total) by mouth at bedtime. 90 tablet 3     metFORMIN (GLUCOPHAGE) 1000 MG tablet TAKE ONE TABLET BY MOUTH TWICE DAILY WITH MEALS 180 tablet 3     simvastatin (ZOCOR) 10 MG tablet Take 1 tablet (10 mg total) by mouth at bedtime. 90 tablet 3     No current facility-administered medications for this visit.

## 2021-06-09 NOTE — TELEPHONE ENCOUNTER
Who is calling:  Patient   Reason for Call:  Patient is asking providers suggestion is it ok to take Lipozene tablet for weight lost with her current medications . Please advise .  Date of last appointment with primary care: 09/11/19  Okay to leave a detailed message: No

## 2021-06-09 NOTE — TELEPHONE ENCOUNTER
Who is calling:  Patient  Reason for Call:  Pt calling because she tried to donate blood today and her Hemoglobin was low.  Clinic poked both fingers one being 12.4 and the other 11.7.  Pt was given a hand out on foods to increase her HGB, but she states she is already eating these items.  Pt wondering if an order of Iron can be prescribed to [facility] if provider advises?  Date of last appointment with primary care: N/A  Okay to leave a detailed message: Yes

## 2021-06-09 NOTE — TELEPHONE ENCOUNTER
Question following Office Visit  When did you see your provider: 7/15/2020 and 9/11/19.    What is your question: I am requesting a copy of my after visit summaries and a lab results letter from those dates.  My computer is down and I do not have this information on those appointments.  Okay to leave a detailed message: Yes

## 2021-06-09 NOTE — TELEPHONE ENCOUNTER
Refill Request  Did you contact pharmacy: Yes  Medication name:   Requested Prescriptions     Pending Prescriptions Disp Refills     lisinopriL (PRINIVIL,ZESTRIL) 20 MG tablet 90 tablet 2     Sig: Take 1 tablet (20 mg total) by mouth at bedtime.     Who prescribed the medication: Gomez Blevins MD  Requested Pharmacy: [facility]  Is patient out of medication: No.  5 days left  Patient notified refills processed in 3 business days:  yes  Okay to leave a detailed message: no

## 2021-06-09 NOTE — PATIENT INSTRUCTIONS - HE
Refill meds    Update blood tests    Update mammogram    No need for colon, last was 2014, normal    Update pneumonia booster

## 2021-06-09 NOTE — TELEPHONE ENCOUNTER
That hemoglobin is a bit lower than normal for her    I recommend repeating hemoglobin in our lab plus iron studies, B12 level, and inflammatory markers to explore type of anemia more carefully.  Labs ordered.  Please assist with lab appointment    Will also send iron supplement order to [facility]

## 2021-06-09 NOTE — TELEPHONE ENCOUNTER
Refill Approved    Rx renewed per Medication Renewal Policy. Medication was last renewed on 12/13/19.    Mary Lou Gamboa, [facility] Triage/Med Refill 6/22/2020     Requested Prescriptions   Pending Prescriptions Disp Refills     lisinopriL (PRINIVIL,ZESTRIL) 20 MG tablet 90 tablet 2     Sig: Take 1 tablet (20 mg total) by mouth at bedtime.       [facility] Inhibitors Refill Protocol Passed - 6/22/2020  2:19 PM        Passed - PCP or prescribing provider visit in past 12 months       Last office visit with prescriber/PCP: 9/11/2019 Gomez Blevins MD OR same dept: 9/11/2019 Gomez Blevins MD OR same specialty: 9/11/2019 Gomez Blevins MD  Last physical: Visit date not found Last MTM visit: Visit date not found   Next visit within 3 mo: Visit date not found  Next physical within 3 mo: Visit date not found  Prescriber OR PCP: Gomez Blevins MD  Last diagnosis associated with med order: 1. Essential hypertension with goal blood pressure less than 140/90  - lisinopriL (PRINIVIL,ZESTRIL) 20 MG tablet; Take 1 tablet (20 mg total) by mouth at bedtime.  Dispense: 90 tablet; Refill: 2    If protocol passes may refill for 12 months if within 3 months of last provider visit (or a total of 15 months).             Passed - Serum Potassium in past 12 months     Lab Results   Component Value Date    Potassium 3.9 09/11/2019             Passed - Blood pressure filed in past 12 months     BP Readings from Last 1 Encounters:   09/11/19 138/80             Passed - Serum Creatinine in past 12 months     Creatinine   Date Value Ref Range Status   09/11/2019 1.45 (H) 0.60 - 1.10 mg/dL Final

## 2021-06-09 NOTE — TELEPHONE ENCOUNTER
Patient Returning Call  Reason for call:  Patient returning call to the clinic.  Information relayed to patient:  Yes as instructed and patient agrees with plan to schedule.  Patient will call back to schedule.  Patient has additional questions:  No  If YES, what are your questions/concerns:  none  Okay to leave a detailed message?: Yes

## 2021-06-10 NOTE — TELEPHONE ENCOUNTER
Patient Returning Call  Reason for call:  Return call  Information relayed to patient:  Patient was informed of the message below.  Patient has additional questions:  Yes  If YES, what are your questions/concerns:  Patient stated that she would like a printed copy of her lab result on 7/15 and 9/11 mailed to her home address on file. Patient stated that she would like to compare her labs first. Patient stated that her computer is broken and is unable to view it on My-Chart.  Okay to leave a detailed message?: No

## 2021-06-10 NOTE — TELEPHONE ENCOUNTER
Left message for patient .    I informed her that she needs to come back in for HGB check and that iron was sent to the pharmacy.     Please help patient schedule lab only appointment.

## 2021-06-10 NOTE — TELEPHONE ENCOUNTER
Drug Change Request  Who is calling?:  Patient  What is the current medication?:    ferrous gluconate (FERGON) 324 MG tablet  324 mg, Oral, 2 times daily after meals         Summary: Take 1 tablet (324 mg total) by mouth 2 (two) times a day with meals., Starting Fri 7/24/2020, Until Thu 10/22/2020, Normal      What alternative is being requested?: None offered.     Why the request to change?:  Denial Not formulary.  Does Dr Blevins want an alternative or start PA?  Please advise.   [facility]ed [facility]?: [facility]  [facility] as needs to go to local [facility].   Okay to leave a detailed message?:  Yes

## 2021-06-10 NOTE — PROGRESS NOTES
ASSESSMENT:  1. Controlled type 2 diabetes mellitus without complication, without long-term current use of insulin  Stable without hypoglycemia  - Glycosylated Hemoglobin A1c  - Basic Metabolic Panel    2. Essential hypertension with goal blood pressure less than 140/90  Stable.  Poorly monitored at home.  Edema much improved    3.  Health maintenance.  Linda done with colon cancer screening.  Discussed mammograms    PLAN:  Patient Instructions   Goal BP at age 76 is 120-140 over 70-90.  Top number 100 plus half your age     I still recommend mammograms for you, but every 12-24 months    Colonoscopy in 2014 was normal.  That means you can wait 10 years, but that puts you at 83 years old, and we stop at age 80      Orders Placed This Encounter   Procedures     Glycosylated Hemoglobin A1c     Basic Metabolic Panel     There are no discontinued medications.    Return in about 4 months (around 9/25/2017).    CHIEF COMPLAINT:  Chief Complaint   Patient presents with     Follow-up     blood pressure     Diabetes       HISTORY OF PRESENT ILLNESS:  Berta is a 76 y.o. female presenting to the clinic today for follow up for hypertension and diabetes.  Her last visit to the clinic for these issues was 2/2/17.     Hypertension: She continues on hydrochlorothiazide 50mg and lisinopril 20mg. Her leg swelling is improved following discontinuation of nifedipine and initiation of hydrochlorothiazide at a visit on 10/20/16. Hydrochlorothiazide was increased at her last visit in February due to elevated readings in the clinic. Her BP today is 130/80.     Type 2 Diabetes: She continues on metformin 1000mg twice daily. Her most recent A1c was 6.4 on 2/2/17.     Hyperlipidemia: She continues on simvastatin 10mg daily. Her most recent lipid panel showed a total cholesterol of 123 on 7/20/17. She is having no effects from the medication.     Health Maintenance: She is up to date on immunizations. She is due for mammogram and wonders  "if she needs to continue due to her age. Her last was in June 2016 and was normal.     REVIEW OF SYSTEMS:   She reports occasional nose bleeds. She is taking a daily low-dose aspirin.   All other systems are negative.    PFSH:  She reports 2 sisters with breast cancer.     TOBACCO USE:  History   Smoking Status     Former Smoker   Smokeless Tobacco     Not on file       VITALS:  Vitals:    05/25/17 1030   BP: 130/80   Pulse: 73   Resp: 16   Weight: 186 lb (84.4 kg)   Height: 5' 6\" (1.676 m)     Wt Readings from Last 3 Encounters:   05/25/17 186 lb (84.4 kg)   02/02/17 186 lb 6.4 oz (84.6 kg)   10/20/16 188 lb (85.3 kg)     Body mass index is 30.02 kg/(m^2).    PHYSICAL EXAM:  Constitutional:  Reveals an alert pleasant woman. In no distress.  Vitals:  Per nursing notes.  Cardiac:  Regular rate and rhythm without murmurs, rubs, or gallops. Legs without edema. Palpation of the radial pulse regular.  Lungs: Clear.  Respiratory effort normal.  Rheumatologic: Normal joints and nails of the hands.  Neurologic:  Cranial nerves II-XII intact.     Psychiatric:  Mood appropriate, memory intact.     ADDITIONAL HISTORY SUMMARIZED (2): Reviewed note of 10/20/16.   DECISION TO OBTAIN EXTRA INFORMATION (1): None.   RADIOLOGY TESTS (1): None.  LABS (1): Labs ordered. Past labs regarding lipids reviewed. Labs ordered.   MEDICINE TESTS (1): None.  INDEPENDENT REVIEW (2 each): None.     The visit lasted a total of 17 minutes face to face with the patient. Over 50% of the time was spent counseling and educating the patient about diabetes and hypertension.    IEugenio, am scribing for and in the presence of, Dr. Blevins.    I, Dr. Blevins, personally performed the services described in this documentation, as scribed by Eugenio Lundy in my presence, and it is both accurate and complete.    MEDICATIONS:  Current Outpatient Prescriptions   Medication Sig Dispense Refill     ascorbic acid (ASCORBIC ACID WITH LONNY HIPS) 500 MG tablet " Take 500 mg by mouth daily.       aspirin 81 MG EC tablet Take 81 mg by mouth every evening.        calcium-vitamin D (CALCIUM-VITAMIN D) 500 mg(1,250mg) -200 unit per tablet Take 1 tablet by mouth 2 (two) times a day with meals.       hydroCHLOROthiazide (HYDRODIURIL) 50 MG tablet Take 1 tablet (50 mg total) by mouth daily. 90 tablet 3     lisinopril (PRINIVIL,ZESTRIL) 20 MG tablet TAKE ONE TABLET BY MOUTH ONE TIME DAILY AT BEDTIME 90 tablet 3     metFORMIN (GLUCOPHAGE) 1000 MG tablet TAKE ONE TABLET BY MOUTH TWICE DAILY WITH MEALS 180 tablet 3     potassium chloride SA (K-DUR,KLOR-CON) 20 MEQ tablet Take 1 tablet (20 mEq total) by mouth daily. 90 tablet 3     simvastatin (ZOCOR) 10 MG tablet Take 1 tablet (10 mg total) by mouth bedtime. 90 tablet 3     No current facility-administered medications for this visit.        Total data points: 3

## 2021-06-11 NOTE — PROGRESS NOTES
Berta Witt  1940      Assessment and Plan:  1.  Left shin abrasion healing without problem no evidence of infection.  Superficial and linear.  Dependent edema.  Dependent ecchymoses.  Reassurance signs of infection and other signs indicating the problem needs to be readdressed reviewed with the patient; elevate leg for edema and consider using her compressive stockings if not too painful      Chief Complaint: Chin abrasion    Visit diagnoses:    1. Wound of left lower extremity      uneventful healing expected dependent edema       Meds:  Current Outpatient Prescriptions   Medication Sig Dispense Refill     ascorbic acid (ASCORBIC ACID WITH LONNY HIPS) 500 MG tablet Take 500 mg by mouth daily.       aspirin 81 MG EC tablet Take 81 mg by mouth every evening.        calcium-vitamin D (CALCIUM-VITAMIN D) 500 mg(1,250mg) -200 unit per tablet Take 1 tablet by mouth 2 (two) times a day with meals.       hydroCHLOROthiazide (HYDRODIURIL) 50 MG tablet Take 1 tablet (50 mg total) by mouth daily. 90 tablet 3     lisinopril (PRINIVIL,ZESTRIL) 20 MG tablet TAKE ONE TABLET BY MOUTH ONE TIME DAILY AT BEDTIME 90 tablet 3     metFORMIN (GLUCOPHAGE) 1000 MG tablet TAKE ONE TABLET BY MOUTH TWICE DAILY WITH MEALS 180 tablet 3     mupirocin (BACTROBAN) 2 % ointment Apply to affected area 3 times daily 22 g 0     potassium chloride SA (K-DUR,KLOR-CON) 20 MEQ tablet Take 1 tablet (20 mEq total) by mouth 2 (two) times a day. 180 tablet 3     simvastatin (ZOCOR) 10 MG tablet Take 1 tablet (10 mg total) by mouth bedtime. 90 tablet 3     No current facility-administered medications for this visit.        Allergies   Allergen Reactions     Penicillins Hives       ROS: complete review of symptoms otherwise negative except as noted below    S: Fell one week ago injuring left shin, wound is slowly getting better it is mostly superficial abrasion.  There is some associated dependent edema and dependent ecchymoses in the foot which was of  "concern but I reassured her that this is a normal progression       O:   Vitals:    06/30/17 1311   BP: 134/70   Patient Site: Left Arm   Patient Position: Sitting   Cuff Size: Adult Large   Pulse: 74   Weight: 188 lb (85.3 kg)   Height: 5' 6\" (1.676 m)       Physical Exam:  General-  VS- see above    Extremities: Linear superficial abrasion over shin no evidence of infection there is some trace to +1 deep dependent edema and dependent ecchymoses in the medial foot as one might expect; no  serious abnormalities noted no evidence of infection pulses in the foot are intact        Oren Gill MD              "

## 2021-06-11 NOTE — PROGRESS NOTES
Assessment/Plan:   Contusion and abrasion down the left anterior shin following fall from a chair yesterday.  Mild head bump, no LOC, no HA, dizziness, no neck or back pain.  Arms, hips, right leg and ankles and knees and feet working fine.  No foot swelling.  No calf tenderness or palpable venous cords, negative eleanor's.  No signs of compartment syndrome at this time.  CMS intact.     Ice to leg, elevate  Range of motion of ankle and knee, flex muscles in lower leg often etc to prevent stiffness and becoming too sedentary.   Keep abrasion clean  Apply mupirocin 2-3 times a day to prevent infection  Reviewed warming signs of secondary cellulitis, compartment syndrome, DVT.  She may feel more stiff and achy over the next 2 days following the fall which is to be expected.    May use tylenol and ice if needed for pain in leg or back or elsewhere  Follow up as needed       Subjective:      Berta Witt is a 76 y.o. female who presents for evaluation of left leg pain.  She sustained bruising, swelling and an abrasion to her left anterior shin following fall from a chair yesterday. She thinks she scraped it on the chair she was standing on.  Mild head bump, no LOC, no HA, dizziness, no neck or back pain.  Arms, hips, right leg and ankles and knees and feet working fine.  No foot swelling.  Worried about blood clot in her leg.  Has history of back problems but so far feels fine in her back.  No numbness in her foot and she can wiggle her toes.      Allergies   Allergen Reactions     Penicillins Hives     Current Outpatient Prescriptions on File Prior to Visit   Medication Sig Dispense Refill     ascorbic acid (ASCORBIC ACID WITH LONNY HIPS) 500 MG tablet Take 500 mg by mouth daily.       aspirin 81 MG EC tablet Take 81 mg by mouth every evening.        calcium-vitamin D (CALCIUM-VITAMIN D) 500 mg(1,250mg) -200 unit per tablet Take 1 tablet by mouth 2 (two) times a day with meals.       hydroCHLOROthiazide (HYDRODIURIL)  50 MG tablet Take 1 tablet (50 mg total) by mouth daily. 90 tablet 3     lisinopril (PRINIVIL,ZESTRIL) 20 MG tablet TAKE ONE TABLET BY MOUTH ONE TIME DAILY AT BEDTIME 90 tablet 3     metFORMIN (GLUCOPHAGE) 1000 MG tablet TAKE ONE TABLET BY MOUTH TWICE DAILY WITH MEALS 180 tablet 3     potassium chloride SA (K-DUR,KLOR-CON) 20 MEQ tablet Take 1 tablet (20 mEq total) by mouth 2 (two) times a day. 180 tablet 3     simvastatin (ZOCOR) 10 MG tablet Take 1 tablet (10 mg total) by mouth bedtime. 90 tablet 3     No current facility-administered medications on file prior to visit.      Patient Active Problem List   Diagnosis     Vitamin D Deficiency     Hyperlipidemia     Overweight     Hemorrhoids     Spinal stenosis, lumbar region, with neurogenic claudication     Essential hypertension with goal blood pressure less than 140/90     Controlled type 2 diabetes mellitus without complication, without long-term current use of insulin       Objective:     /72  Pulse 66  Temp 98.4  F (36.9  C) (Oral)   Resp 16  Wt 184 lb (83.5 kg)  BMI 29.7 kg/m2    Physical  General Appearance: Alert, cooperative, no distress  Head: Normocephalic, without obvious abnormality, atraumatic  Eyes: Conjunctivae are normal. EOMI, PERRLA  Neck: No adenopathy; supple  Lungs: Clear to auscultation bilaterally, respirations unlabored  Heart: Regular rate and rhythm  Extremities: good range of motion at her arms and hips and knees - even the left one - and ankles.  The left anterior shin has bruising and swelling.  There is a superficial abrasion that is clean and dry and has no apparent cellulitis or purulent drainage at this time.  Pedal pulses intact and she has normal cap refill.  Normal foot sensation and no swelling.  No calf tenderness or palpable venous cords.  Negative eleanor's.   Possible hematoma but no fluctuance.    Psychiatric: Patient has a normal mood and affect.

## 2021-06-12 NOTE — TELEPHONE ENCOUNTER
Medication Question or Clarification  Who is calling: patient   What medication are you calling about (include dose and sig)?:    Disp Refills Start End    metFORMIN (GLUCOPHAGE) 1000 MG tablet 180 tablet 3 7/15/2020     Sig: TAKE ONE TABLET BY MOUTH TWICE DAILY WITH MEALS    Sent to pharmacy as: metFORMIN 1,000 mg tablet (GLUCOPHAGE)    E-Prescribing Status: Receipt confirmed by pharmacy (7/15/2020  4:20 PM CDT)        Who prescribed the medication?: Gomez lBevins MD   What is your question/concern?: caller would like a call back to discuss this medication recall due to not have been taking this for couple days now and was told that its on back order so dont want to take this medication at all.   Requested Pharmacy: n/a  Okay to leave a detailed message?: Yes

## 2021-06-12 NOTE — TELEPHONE ENCOUNTER
Left pt detailed message explaining that recall only affected certain lot numbers of certain dosages.  Advised pt to double check with pharmacy if hers was affected and let clinic know.

## 2021-06-12 NOTE — PROGRESS NOTES
ASSESSMENT:  1. Controlled type 2 diabetes mellitus without complication, without long-term current use of insulin  Stable.  Recommend yearly foot and eye exams  - Glycosylated Hemoglobin A1c    2. Health care maintenance  Stable.  Reviewed mammograms and colonoscopy.  Reviewed flu shots  - Influenza High Dose, Seasonal 65+ yrs    3. Essential hypertension with goal blood pressure less than 140/90  Stable  - Basic Metabolic Panel    4.  Hypokalemia.  Did not increase potassium last visit as recommended.  Recheck and advise accordingly now on potassium once daily    PLAN:  Patient Instructions   Due for mammogram sometime before next June    Colon done in 2014, so due next in 2024.      I have all my patients on vitamin B 12 2000 mcg daily and vitamin D 2000 units daily    Eyes and feet looked over once per year with diabetes    Flu shot    Blood tests    Repeat the potassium and answer that question          Orders Placed This Encounter   Procedures     Influenza High Dose, Seasonal 65+ yrs     Basic Metabolic Panel     Glycosylated Hemoglobin A1c     There are no discontinued medications.    Return in about 4 months (around 1/6/2018).    CHIEF COMPLAINT:  Chief Complaint   Patient presents with     Diabetes     Medication Management     Wound Check     Left Shin       HISTORY OF PRESENT ILLNESS:  Berta is a 77 y.o. female presenting to the clinic today for a medication check and with complaints of a leg wound.     Diabetes: Her last hemoglobin A1c was 6.3% on 5/25/2017. She continues to take 1000 mg of metformin twice daily and denies stomach problems from the medication. She does not have her feet checked by a podiatrist any longer. She does not have any numbness or tingling in her feet. She was started on simvastatin because she is a diabetic and continues to take 10 mg daily. She denies muscle or joint aches from the medication.    Hypertension: Her blood pressure was 120/80 when she donated blood yesterday.  "Her systolic pressure is slightly higher today, and she inquires if that is anything to be concerned about. She continues to take lisinopril and hydrochlorothiazide. She denies a cough from the lisinopril.     Leg Wound: She has a wound on her left anterior shin. It has been healing very slowly, and she inquires if she needs to worry about it. She was seen for this in June and was warned it could take a while to heal.     Health Maintenance: She will get the flu shot today. She will get a mammogram by next June.     REVIEW OF SYSTEMS:   She continues to take a potassium supplement, but she did not know whether to take one or two tablets daily. She was recently seen by the eye doctor and has a slight cataract, but it does not bother her. All other systems are negative.    PFSH:  Reviewed, as below.     TOBACCO USE:  History   Smoking Status     Former Smoker   Smokeless Tobacco     Not on file       VITALS:  Vitals:    09/06/17 1338   BP: 134/80   Patient Site: Left Arm   Patient Position: Sitting   Cuff Size: Adult Regular   Pulse: 78   SpO2: 99%   Weight: 187 lb 1.6 oz (84.9 kg)   Height: 5' 6\" (1.676 m)     Wt Readings from Last 3 Encounters:   09/06/17 187 lb 1.6 oz (84.9 kg)   06/30/17 188 lb (85.3 kg)   06/24/17 184 lb (83.5 kg)     Body mass index is 30.2 kg/(m^2).    PHYSICAL EXAM:  Constitutional:  Reveals an alert, pleasant woman.  Vitals:  Per nursing notes.  Cardiac:  Regular rate and rhythm without murmurs, rubs, or gallops. Legs without edema. Palpation of the radial pulse regular.  Lungs: Clear.  Respiratory effort normal.  Rheumatologic: Normal joints and nails of the hands. Fungal nail changes in all nails of the feet.   Neurologic:  Cranial nerves II-XII intact.     Psychiatric:  Mood appropriate, memory intact.     ADDITIONAL HISTORY SUMMARIZED (2): Reviewed health screen results that she brought in today. Reviewed 7/5/2017 [facility] note regarding cataract and no diabetic changes. "   DECISION TO OBTAIN EXTRA INFORMATION (1): None.   RADIOLOGY TESTS (1): None.  LABS (1): Labs from 5/25/2017 reviewed. Labs ordered.   MEDICINE TESTS (1): None.  INDEPENDENT REVIEW (2 each): None.     The visit lasted a total of 15 minutes face to face with the patient. Over 50% of the time was spent counseling and educating the patient about her medications.    I, Shmuel Dash, am scribing for and in the presence of, Dr. Blevins.    I, Dr. Blevins, personally performed the services described in this documentation, as scribed by Shmuel Dash in my presence, and it is both accurate and complete.    MEDICATIONS:  Current Outpatient Prescriptions   Medication Sig Dispense Refill     ascorbic acid (ASCORBIC ACID WITH LONNY HIPS) 500 MG tablet Take 500 mg by mouth daily.       aspirin 81 MG EC tablet Take 81 mg by mouth every evening.        calcium-vitamin D (CALCIUM-VITAMIN D) 500 mg(1,250mg) -200 unit per tablet Take 1 tablet by mouth 2 (two) times a day with meals.       cyanocobalamin (VITAMIN B-12) 250 MCG tablet Take 250 mcg by mouth daily.       hydroCHLOROthiazide (HYDRODIURIL) 50 MG tablet Take 1 tablet (50 mg total) by mouth daily. 90 tablet 3     lisinopril (PRINIVIL,ZESTRIL) 20 MG tablet TAKE ONE TABLET BY MOUTH ONE TIME DAILY AT BEDTIME 90 tablet 3     metFORMIN (GLUCOPHAGE) 1000 MG tablet TAKE ONE TABLET BY MOUTH TWICE DAILY WITH MEALS 180 tablet 3     potassium chloride SA (K-DUR,KLOR-CON) 20 MEQ tablet Take 1 tablet (20 mEq total) by mouth 2 (two) times a day. (Patient taking differently: Take 20 mEq by mouth daily. ) 180 tablet 3     simvastatin (ZOCOR) 10 MG tablet Take 1 tablet (10 mg total) by mouth bedtime. 90 tablet 3     No current facility-administered medications for this visit.        Total data points: 3

## 2021-06-13 NOTE — TELEPHONE ENCOUNTER
Refill Request  Did you contact pharmacy: Yes  Medication name:   Requested Prescriptions     Pending Prescriptions Disp Refills     hydroCHLOROthiazide (HYDRODIURIL) 50 MG tablet 90 tablet 1     Sig: Take 1 tablet (50 mg total) by mouth daily.     Who prescribed the medication: Gomez Blevins MD    Requested Pharmacy: [facility]  Is patient out of medication: No.  19 days left  Patient notified refills processed in 3 business days:  yes  Okay to leave a detailed message: yes

## 2021-06-13 NOTE — TELEPHONE ENCOUNTER
Refill Approved    Rx renewed per Medication Renewal Policy. Medication was last renewed on 7/15/20.    Avelina Mccall, [facility] Triage/Med Refill 11/19/2020     Requested Prescriptions   Pending Prescriptions Disp Refills     hydroCHLOROthiazide (HYDRODIURIL) 50 MG tablet 90 tablet 1     Sig: Take 1 tablet (50 mg total) by mouth daily.       Diuretics/Combination Diuretics Refill Protocol  Passed - 11/17/2020 12:49 PM        Passed - Visit with PCP or prescribing provider visit in past 12 months     Last office visit with prescriber/PCP: 7/15/2020 Gomez Blevins MD OR same dept: Visit date not found OR same specialty: 7/15/2020 Gomez Blevins MD  Last physical: Visit date not found Last MTM visit: Visit date not found   Next visit within 3 mo: Visit date not found  Next physical within 3 mo: Visit date not found  Prescriber OR PCP: Gomez Blevins MD  Last diagnosis associated with med order: 1. Essential hypertension with goal blood pressure less than 140/90  - hydroCHLOROthiazide (HYDRODIURIL) 50 MG tablet; Take 1 tablet (50 mg total) by mouth daily.  Dispense: 90 tablet; Refill: 1    If protocol passes may refill for 12 months if within 3 months of last provider visit (or a total of 15 months).             Passed - Serum Potassium in past 12 months      Lab Results   Component Value Date    Potassium 3.5 07/15/2020             Passed - Serum Sodium in past 12 months      Lab Results   Component Value Date    Sodium 140 07/15/2020             Passed - Blood pressure on file in past 12 months     BP Readings from Last 1 Encounters:   07/15/20 130/68             Passed - Serum Creatinine in past 12 months      Creatinine   Date Value Ref Range Status   07/15/2020 1.74 (H) 0.60 - 1.10 mg/dL Final

## 2021-06-14 NOTE — TELEPHONE ENCOUNTER
Reason for call:  Patient reporting a symptom    Symptom or request: MANY MOLES ON YOUR BODY AND FOUND ONE ON YOUR SCALP     Duration (how long have symptoms been present): QUITE SOME TIME    Have you been treated for this before? Yes    Additional comments:     Phone Number patient can be reached at:    Cell number on file:    Telephone Information:   Mobile [phone]       Best Time:  ANYTIME    Can we leave a detailed message on this number: Yes    Call taken on 1/8/2021 at 2:41 PM by Elly Shaw

## 2021-06-14 NOTE — TELEPHONE ENCOUNTER
Patient called and would like if PCP would do a referral for Dermatology due to various moles and skin changes. Please advise and enter in referral as appropriate, thank you.

## 2021-06-14 NOTE — PATIENT INSTRUCTIONS - HE
Decrease hydrochlorothiazide to 25 mg daily    Hold hydrochlorothiazide February 2 and February 30    Hydrochlorothiazide 25 mg at bedtime February 4    Covid vaccine February 5    Labs February 5 on decreased hydrochlorothiazide to reassess kidney function

## 2021-06-14 NOTE — PROGRESS NOTES
Berta is a 80 y.o. female being evaluated via a billable phone visit, and would like to be contacted via the following number [phone]       ASSESSMENT:  1. Controlled type 2 diabetes mellitus with stage 3 chronic kidney disease, without long-term current use of insulin (H)  Stable.  Due for labs  - Glycosylated Hemoglobin A1c; Future    2. Essential hypertension with goal blood pressure less than 140/90  Stable.  Increase hydrochlorothiazide may worsen renal function.  Trial of decreased dose  - Comprehensive Metabolic Panel; Future  - hydroCHLOROthiazide (HYDRODIURIL) 25 MG tablet; Take 1 tablet (25 mg total) by mouth daily.  Dispense: 90 tablet; Refill: 3    3. Iron deficiency anemia due to chronic blood loss  Monitor hemoglobin.  Iron deficient due to blood donation every 2 months  - HM2(CBC w/o Differential); Future    4. Vitamin D deficiency  Update labs  - Vitamin D, Total (25-Hydroxy); Future    5. Stage 3b chronic kidney disease  Reviewed sequence with worsening after 2016 and increase in hydrochlorothiazide.  Recheck after decreased dose.  Monitor for edema    6.  Edema.  While on nifedipine.  Resolved off nifedipine.  Decrease intensity of hydrochlorothiazide    Preventive Health Care: Scheduled for Covid vaccine later this week.  Update labs    PLAN:  Patient Instructions   Decrease hydrochlorothiazide to 25 mg daily    Hold hydrochlorothiazide February 2 and February 30    Hydrochlorothiazide 25 mg at bedtime February 4    Covid vaccine February 5    Labs February 5 on decreased hydrochlorothiazide to reassess kidney function        Return in about 6 months (around 8/2/2021) for a phone/video follow up visit.      CHIEF COMPLAINT:  Chief Complaint   Patient presents with     Follow-up     Medication Management       HISTORY OF PRESENT ILLNESS:  Berta is a 80 y.o. female contacting the clinic today via phone for review of medication.  She has concern regarding renal insufficiency.  Creatinine  increased to 1.74 last year.  Review of records show this was normal in 2016 and worsened after hydrochlorothiazide was increased to 50 mg    Diabetes has been stable without hypoglycemia    Blood pressure has been stable but poorly monitored.  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  Wt Readings from Last 3 Encounters:   07/15/20 193 lb 12 oz (87.9 kg)   09/11/19 183 lb 5 oz (83.2 kg)   03/22/19 182 lb 4.8 oz (82.7 kg)       PHYSICAL EXAM:  (observations via Phone)  Alert and oriented      Notes summarized: Reviewed notes for years ago regarding hydrochlorothiazide adjustment  Labs, x-rays, cardiology, GI tests reviewed:   New orders:   Orders Placed This Encounter   Procedures     Comprehensive Metabolic Panel     Standing Status:   Future     Standing Expiration Date:   2/2/2022     Glycosylated Hemoglobin A1c     Standing Status:   Future     Standing Expiration Date:   2/2/2022     HM2(CBC w/o Differential)     Standing Status:   Future     Standing Expiration Date:   2/2/2022     Vitamin D, Total (25-Hydroxy)     Standing Status:   Future     Standing Expiration Date:   2/2/2022       Independent review of:  Supplemental history by:      Phone Start time: 3:49 PM  Phone End time: 4:21 PM  Conversation plus orders: 32 minutes  Dictation time: 3 minutes    The visit lasted a total of 35 minutes       MEDICATIONS  Current Outpatient Medications   Medication Sig Dispense Refill     ascorbic acid (ASCORBIC ACID WITH LONNY HIPS) 500 MG tablet Take 500 mg by mouth daily.       aspirin 81 MG EC tablet Take 81  "mg by mouth every evening.        calcium-vitamin D (CALCIUM-VITAMIN D) 500 mg(1,250mg) -200 unit per tablet Take 1 tablet by mouth 2 (two) times a day with meals.       cyanocobalamin (VITAMIN B-12) 250 MCG tablet Take 250 mcg by mouth daily.       ferrous sulfate 325 (65 FE) MG tablet Take 1 tablet by mouth daily with breakfast.       hydroCHLOROthiazide (HYDRODIURIL) 25 MG tablet Take 1 tablet (25 mg total) by mouth daily. 90 tablet 3     KLOR-CON M20 20 mEq tablet Take 2 tablets (40 mEq total) by mouth daily. 180 tablet 3     lisinopriL (PRINIVIL,ZESTRIL) 20 MG tablet Take 1 tablet (20 mg total) by mouth at bedtime. 90 tablet 3     metFORMIN (GLUCOPHAGE) 1000 MG tablet TAKE ONE TABLET BY MOUTH TWICE DAILY WITH MEALS 180 tablet 3     simvastatin (ZOCOR) 10 MG tablet Take 1 tablet (10 mg total) by mouth at bedtime. 90 tablet 3     No current facility-administered medications for this visit.        The patient has been notified of following:     \"This telephone visit will be conducted via a call between you and your physician/provider. We have found that certain health care needs can be provided without the need for a physical exam.  This service lets us provide the care you need with a short phone conversation.  If a prescription is necessary we can send it directly to your pharmacy.  If lab work is needed we can place an order for that and you can then stop by our lab to have the test done at a later time.    Patient would like to receive their AVS by AVS Preference: Nae.    Gomez Blevins MD     "

## 2021-06-15 PROBLEM — I10 ESSENTIAL HYPERTENSION WITH GOAL BLOOD PRESSURE LESS THAN 140/90: Status: ACTIVE | Noted: 2017-02-02

## 2021-06-15 PROBLEM — E11.22 CONTROLLED TYPE 2 DIABETES MELLITUS WITH STAGE 3 CHRONIC KIDNEY DISEASE, WITHOUT LONG-TERM CURRENT USE OF INSULIN (H): Status: ACTIVE | Noted: 2017-02-02

## 2021-06-15 PROBLEM — N18.30 CONTROLLED TYPE 2 DIABETES MELLITUS WITH STAGE 3 CHRONIC KIDNEY DISEASE, WITHOUT LONG-TERM CURRENT USE OF INSULIN (H): Status: ACTIVE | Noted: 2017-02-02

## 2021-06-15 NOTE — PROGRESS NOTES
ASSESSMENT:  1. DM II (diabetes mellitus, type II), controlled  Very well controlled.  No side effects.  Update labs  - Glycosylated Hemoglobin A1C  - metFORMIN (GLUCOPHAGE) 1000 MG tablet; TAKE ONE TABLET BY MOUTH TWICE DAILY WITH MEALS  Dispense: 180 tablet; Refill: 3    2. Essential (primary) hypertension  Update labs  - Basic Metabolic Panel    3. Basal cell carcinoma, forehead  New.  Cryotherapy applied ×1.  No melanoma  - Cryotherapy, skin lesion; Future  - Destruction of lesion    4. Essential hypertension with goal blood pressure less than 140/90  Stable.  Refill meds  - lisinopril (PRINIVIL,ZESTRIL) 20 MG tablet; TAKE ONE TABLET BY MOUTH ONE TIME DAILY AT BEDTIME  Dispense: 90 tablet; Refill: 3  - hydroCHLOROthiazide (HYDRODIURIL) 50 MG tablet; Take 1 tablet (50 mg total) by mouth daily.  Dispense: 90 tablet; Refill: 3      Health maintenance up-to-date    PLAN:  Patient Instructions   That spot on the forehead can be a basal cell skin cancer.  Burn it off with liquid nitrogen    Refilled all meds for the year    Update blood tests          Orders Placed This Encounter   Procedures     Cryotherapy, skin lesion     Standing Status:   Future     Standing Expiration Date:   1/8/2019     Glycosylated Hemoglobin A1C     Basic Metabolic Panel     Destruction of lesion     This order was created via procedure documentation     Medications Discontinued During This Encounter   Medication Reason     lisinopril (PRINIVIL,ZESTRIL) 20 MG tablet Reorder     metFORMIN (GLUCOPHAGE) 1000 MG tablet Reorder     potassium chloride SA (K-DUR,KLOR-CON) 20 MEQ tablet Reorder     simvastatin (ZOCOR) 10 MG tablet Reorder     hydroCHLOROthiazide (HYDRODIURIL) 50 MG tablet Reorder       Return in about 6 months (around 7/8/2018).    CHIEF COMPLAINT:  Chief Complaint   Patient presents with     Diabetes     Mass     on forehead, not regular blackhead       HISTORY OF PRESENT ILLNESS:  Berta is a 77 y.o. female presenting to the  clinic today for follow up for diabetes.     Type 2 Diabetes: She continues on metformin 1000mg twice daily. Her most recent A1c was 6.3 on 5/25/17.     Hypertension: She continues on hydrochlorothiazide 50mg and lisinopril 20mg. She is also taking potassium 40meq daily. Her blood pressure today is 122/76.     Hyperlipidemia: She continues on simvastatin 10mg daily.     REVIEW OF SYSTEMS:   Her breathing is good. She has good energy. She feels well and reports no problems with her medications.   She has a lesion on her forehead that she would like evaluated. She is unsure how long it has been there, but it is recent.   All other systems are negative.    PFSH:      TOBACCO USE:  History   Smoking Status     Former Smoker   Smokeless Tobacco     Not on file       VITALS:  Vitals:    01/08/18 1109   BP: 122/76   Patient Site: Left Arm   Patient Position: Sitting   Cuff Size: Adult Large   Pulse: 72   Weight: 190 lb 9.6 oz (86.5 kg)     Wt Readings from Last 3 Encounters:   01/08/18 190 lb 9.6 oz (86.5 kg)   10/05/17 187 lb (84.8 kg)   09/06/17 187 lb 1.6 oz (84.9 kg)     Body mass index is 30.76 kg/(m^2).    PHYSICAL EXAM:  Constitutional:  Reveals an alert, talkative, well-appearing woman.  Vitals:  Per nursing notes.  Cardiac:  Regular rate and rhythm without murmurs, rubs, or gallops. Legs without edema. Palpation of the radial pulse regular.  Lungs: Clear.  Respiratory effort normal.  Skin:  0.5cm cm raised brown lesion with a slight ulcerated clearing in the middle above left eyebrow.   Rheumatologic: Normal joints and nails of the hands.  Neurologic:  Cranial nerves II-XII intact.     Psychiatric:  Mood appropriate, memory intact.       MEDICATIONS:  Current Outpatient Prescriptions   Medication Sig Dispense Refill     ascorbic acid (ASCORBIC ACID WITH LONNY HIPS) 500 MG tablet Take 500 mg by mouth daily.       aspirin 81 MG EC tablet Take 81 mg by mouth every evening.        calcium-vitamin D (CALCIUM-VITAMIN D)  500 mg(1,250mg) -200 unit per tablet Take 1 tablet by mouth 2 (two) times a day with meals.       cyanocobalamin (VITAMIN B-12) 250 MCG tablet Take 250 mcg by mouth daily.       hydroCHLOROthiazide (HYDRODIURIL) 50 MG tablet Take 1 tablet (50 mg total) by mouth daily. 90 tablet 3     lisinopril (PRINIVIL,ZESTRIL) 20 MG tablet TAKE ONE TABLET BY MOUTH ONE TIME DAILY AT BEDTIME 90 tablet 3     metFORMIN (GLUCOPHAGE) 1000 MG tablet TAKE ONE TABLET BY MOUTH TWICE DAILY WITH MEALS 180 tablet 3     potassium chloride SA (K-DUR,KLOR-CON) 20 MEQ tablet Take 2 tablets (40 mEq total) by mouth daily. 180 tablet 3     simvastatin (ZOCOR) 10 MG tablet Take 1 tablet (10 mg total) by mouth at bedtime. 90 tablet 3     No current facility-administered medications for this visit.        ADDITIONAL HISTORY SUMMARIZED (2): None.  DECISION TO OBTAIN EXTRA INFORMATION (1): None.   RADIOLOGY TESTS (1): None.  LABS (1): Labs ordered.   MEDICINE TESTS (1): None.  INDEPENDENT REVIEW (2 each): None.     The visit lasted a total of 24 minutes face to face with the patient. Over 50% of the time was spent counseling and educating the patient about diabetes.    I, Eugenio Lundy, am scribing for and in the presence of, Dr. Blevins.    I, Dr Blevins, personally performed the services described in this documentation, as scribed by Eugenio Lundy in my presence, and it is both accurate and complete.    Total data points: 1

## 2021-06-15 NOTE — PROGRESS NOTES
Destruction of lesion  Date/Time: 1/8/2018 11:48 AM  Performed by: TRINA SANCHEZ  Authorized by: TRINA SANCHEZ   Consent: Verbal consent obtained.  Risks and benefits: risks, benefits and alternatives were discussed  Consent given by: patient  Patient tolerance: Patient tolerated the procedure well with no immediate complications  Comments: 0.5cm cm raised brown lesion with a slight ulcerated clearing in the middle. Treated with cryotherapy.

## 2021-06-16 ENCOUNTER — COMMUNICATION - HEALTHEAST (OUTPATIENT)
Dept: INTERNAL MEDICINE | Facility: CLINIC | Age: 81
End: 2021-06-16

## 2021-06-16 DIAGNOSIS — N18.30 CONTROLLED TYPE 2 DIABETES MELLITUS WITH STAGE 3 CHRONIC KIDNEY DISEASE, WITHOUT LONG-TERM CURRENT USE OF INSULIN (H): ICD-10-CM

## 2021-06-16 DIAGNOSIS — E11.22 CONTROLLED TYPE 2 DIABETES MELLITUS WITH STAGE 3 CHRONIC KIDNEY DISEASE, WITHOUT LONG-TERM CURRENT USE OF INSULIN (H): ICD-10-CM

## 2021-06-19 NOTE — PROGRESS NOTES
ASSESSMENT:  1. Controlled type 2 diabetes mellitus without complication, without long-term current use of insulin (H)  Stable.  Doing very well.  Update labs  - Glycosylated Hemoglobin A1C  - Lipid Cascade  - AST (SGOT)  - Microalbumin, Random Urine    2. Essential hypertension with goal blood pressure less than 140/90  Stable    3. Body mass index (bmi) 29.0-29.9, adult  Losing weight.  Discussed and congratulated        PLAN:  Patient Instructions   There is a new shingles shot which was released in March 2018.  It is recommended for all adults over 50.    All adults over 50 are assumed to have had chickenpox as children.  The recommendation is for all adults over 50 to have the vaccine, even if they have previously had shingles, even if they have previously had the older shot, Zostavax, even if their immune system is good, even if their immune system is compromised.    The vaccine is categorized as a medication.  Therefore, the shot is covered by insurance through your retail pharmacy where the co-pay and reimbursement will be handled correctly.  If it becomes recharacterized as a vaccine, we may be able to give it in this office in the future        Orders Placed This Encounter   Procedures     Glycosylated Hemoglobin A1C     Lipid Cascade     Order Specific Question:   Fasting is required?     Answer:   Unknown     AST (SGOT)     Microalbumin, Random Urine     Standing Status:   Future     Standing Expiration Date:   7/17/2019     Medications Discontinued During This Encounter   Medication Reason     hydroCHLOROthiazide (HYDRODIURIL) 25 MG tablet Therapy completed       Return in about 6 months (around 1/17/2019).      CHIEF COMPLAINT:  Chief Complaint   Patient presents with     Follow-up     6 month        HISTORY OF PRESENT ILLNESS:  Berta is a 77 y.o. female presenting to the clinic today to follow up on her diabetes and hypertension.     Hypertension: She continues to take lisinopril 20 mg daily and  hydrochlorothiazide 50 mg daily. Her blood pressure is in a good range today. She notes that her blood pressure was higher than usual when she was at the eye clinic one week ago, but it has been well-controlled outside of that.     Diabetes: Her last hemoglobin A1c was 6.9% on 1/8/2018. She continues to take metformin 1000 mg twice daily.     Hyperlipidemia: She continues to take simvastatin 10 mg daily.     Health Maintenance: The Shingrix vaccine was discussed today, and she will plan to get this. The rest of her immunizations are up to date.     REVIEW OF SYSTEMS:   She has been feeling well. She had a lesion on her forehead frozen the last time she was here in January. She thinks the lesion is completely gone; she does not even remember where it is. She has not noticed any lower extremity swelling. She takes potassium twice daily. She has been working on weight loss and has lost about nine pounds since she was last here in January. Her back has felt fine lately. All other systems are negative.    PFSH:  She grew up in [place]. She had back surgery in 2014.     TOBACCO USE:  History   Smoking Status     Former Smoker   Smokeless Tobacco     Never Used       VITALS:  Vitals:    07/17/18 1305   BP: 120/74   Patient Site: Left Arm   Patient Position: Sitting   Cuff Size: Adult Large   Pulse: 74   SpO2: 98%   Weight: 181 lb (82.1 kg)     Wt Readings from Last 3 Encounters:   07/17/18 181 lb (82.1 kg)   01/08/18 190 lb 9.6 oz (86.5 kg)   10/05/17 187 lb (84.8 kg)     Body mass index is 29.21 kg/(m^2).    PHYSICAL EXAM:  Constitutional:  Reveals an alert, pleasant, talkative woman. Does not appear acutely ill.  Vitals:  Per nursing notes.  Cardiac:  Regular rate and rhythm without murmurs, rubs, or gallops. Legs without edema. Palpation of the radial pulse regular.  Psychiatric:  Mood appropriate, memory intact.     MEDICATIONS:  Current Outpatient Prescriptions   Medication Sig Dispense Refill     ascorbic acid  (ASCORBIC ACID WITH LONNY HIPS) 500 MG tablet Take 500 mg by mouth daily.       aspirin 81 MG EC tablet Take 81 mg by mouth every evening.        calcium-vitamin D (CALCIUM-VITAMIN D) 500 mg(1,250mg) -200 unit per tablet Take 1 tablet by mouth 2 (two) times a day with meals.       cyanocobalamin (VITAMIN B-12) 250 MCG tablet Take 250 mcg by mouth daily.       hydroCHLOROthiazide (HYDRODIURIL) 50 MG tablet Take 1 tablet (50 mg total) by mouth daily. 90 tablet 3     lisinopril (PRINIVIL,ZESTRIL) 20 MG tablet TAKE ONE TABLET BY MOUTH ONE TIME DAILY AT BEDTIME 90 tablet 3     metFORMIN (GLUCOPHAGE) 1000 MG tablet TAKE ONE TABLET BY MOUTH TWICE DAILY WITH MEALS 180 tablet 3     potassium chloride SA (K-DUR,KLOR-CON) 20 MEQ tablet Take 2 tablets (40 mEq total) by mouth daily. 180 tablet 3     simvastatin (ZOCOR) 10 MG tablet Take 1 tablet (10 mg total) by mouth at bedtime. 90 tablet 3     No current facility-administered medications for this visit.        ADDITIONAL HISTORY SUMMARIZED (2): Reviewed 7/10/2018 [facility] note - no diabetic changes.   DECISION TO OBTAIN EXTRA INFORMATION (1): None.   RADIOLOGY TESTS (1): None.  LABS (1): Labs from 1/8/2018 reviewed. Labs ordered.   MEDICINE TESTS (1): None.  INDEPENDENT REVIEW (2 each): None.     The visit lasted a total of 11 minutes face to face with the patient. Over 50% of the time was spent counseling and educating the patient about her hypertension and diabetes.    I, Shmuel Dash, am scribing for and in the presence of, Dr. Blevins.    I, Dr. Blevins, personally performed the services described in this documentation, as scribed by Shmuel Dash in my presence, and it is both accurate and complete.    Total data points: 3

## 2021-06-19 NOTE — PROGRESS NOTES
"Nutrition Assessment    Berta Witt is a 77 y.o. female seen for out patient MNT inital assessment. Her children recommended she see a dietitian to help w/ weight loss. Berta has already started making healthy changes to her diet. She has lost some weight. Her Hgb A1c was 6.4% 7/17/18, down from 6.9% 1/8/18. Given her age and gender she has made very appropriate goals and is very self-motivated.    MHx significant for hyperlipidemia, htn, T2DM, and CKD 3.  Their goals for this appointment are further weight loss advice.     Referring diagnosis: E 66.3 overweight    Height:  5' 6\"  Most recent weight:   181 lbs  BMI: 29.2  BMI indication: 25-29.9 overweight     Wt Readings from Last 10 Encounters:   07/17/18 181 lb (82.1 kg)   01/08/18 190 lb 9.6 oz (86.5 kg)   10/05/17 187 lb (84.8 kg)   09/06/17 187 lb 1.6 oz (84.9 kg)   06/30/17 188 lb (85.3 kg)   06/24/17 184 lb (83.5 kg)   05/25/17 186 lb (84.4 kg)   02/02/17 186 lb 6.4 oz (84.6 kg)   10/20/16 188 lb (85.3 kg)   07/20/16 183 lb (83 kg)     Pt is down 9 lbs from January.    Estimated Nutrition Needs:  Using 82.1 kg, with a weight source of current BW.    Energy Needs: RMR ~1324 kcal/d per MSJ equation    Nutrition intervention that addressed medical nutrition therapy for above diagnosis: Weight loss tips, adequate hydration.    RD reviewed eating out/fast foods and physical activity.      Lifestyle changes made prior to nutrition session: Pt has decreased meat consumption, decreased bread consumption, increased water intake, and tries to walk ~30 min/day. She has started paying more attention to hunger cues and portion sizes.    Assessment of diet/weight history:  Eats ~2 meals/day. 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 Meat is mainly skinless chicken breast. Eats out a few times a month-- [employer]. Mostly drinks water, 1 coffee/day w/ cream.    Assessment of physical activity: [place]ims for 30 min walking/day. Will walk around her house if " weather is bad.    Goals:   1.) Congratulations on your progress so far! Continue the healthy habits you have started already!  2.) Portion sizes are important with calorie-dense foods -- even for healthy fats like avocado.  3.) Aim to make most of your meals non-starchy vegetables. No more than 1/4 of your plate should be starch/carbs. Lean proteins.    Patient had no barriers to learning, verbalized understanding, and compliance is expected.    Phone number provided for questions. Recommended follow-up in  as needed.    Thank you for this consult. Call me at [phone] for questions.

## 2021-06-21 NOTE — PROGRESS NOTES
Nephrology Progress Note    Patient seen earlier today.  Denied shortness of breath.  More alert today.    ROS:  Resp:negative  CV:negative  GI:negative  Gu:negative    MEDICATIONS:  • famotidine  20 mg Intravenous Nightly   • vancomycin (VANCOCIN) IVPB  1,250 mg Intravenous Q24H   • insulin glargine  25 Units Subcutaneous Daily   • metoPROLOL tartrate  50 mg Oral 2 times per day   • aspirin  81 mg Oral Daily   • atorvastatin  40 mg Oral Nightly   • insulin lispro   Subcutaneous 4x Daily AC & HS   • sodium chloride (PF)  2 mL Intracatheter 2 times per day   • VANCOMYCIN - PHARMACIST MONITORED   Does not apply See Admin Instructions   • cefepime (MAXIPIME) IVPB  2,000 mg Intravenous Q12H   • acyclovir  10 mg/kg (Ideal) Intravenous Q12H          Physical Examination:  Vital Signs:    Visit Vitals  /62   Pulse 74   Temp 97.5 °F (36.4 °C) (Oral)   Resp 15   Ht 5' 11\" (1.803 m)   Wt 114.4 kg (252 lb 3.3 oz)   SpO2 97%   BMI 35.18 kg/m²     General:  No acute distress.   Head:  Normocephalic-atraumatic.   Neck:  Trachea is midline. No JVD.  Eyes:  Normal conjunctivae.  Extraocular movements intact.    ENT:   Mucous membranes are dry.  Cardiovascular:  S1, S2 auscultated.  Regular rate and rhythm.  No rub audible. Trace edema.  Respiratory:   Decreased breath sounds at bilateral bases.  Gastrointestinal:  Soft and nontender.  Non-distended.  Positive bowel sounds.    Genitourinary: No CVA tenderness.    Musculoskeletal:  No joint swelling.  Normal range of motion.  No deformity.      Skin: Warm and dry.  No rash noted.  Neurologic:   Moves all extremities; follows simple commands  Psychiatric:   More alert today.      I/O's    Intake/Output Summary (Last 24 hours) at 12/17/2019 1946  Last data filed at 12/17/2019 1714  Gross per 24 hour   Intake 1869.73 ml   Output 1045 ml   Net 824.73 ml       Labs:    Recent Labs   Lab 12/17/19  0430 12/16/19  2219 12/16/19  1350   SODIUM 139 142 133*   POTASSIUM 4.0 4.5 7.4*  ASSESSMENT:  1. Inflamed seborrheic keratosis  Benign.  Cryotherapy applied x1  - Cryotherapy, skin lesion; Future    2. Controlled type 2 diabetes mellitus with stage 3 chronic kidney disease, without long-term current use of insulin (H)  Stable.  Due for updated labs  - Glycosylated Hemoglobin A1c  - Microalbumin, Random Urine    3. Controlled type 2 diabetes mellitus with stage 3 chronic kidney disease, without long-term current use of insulin  As above  - Glycosylated Hemoglobin A1c  - Microalbumin, Random Urine        PLAN:  Patient Instructions   Spot is seborrheic keratosis    Update diabetes blood test            Orders Placed This Encounter   Procedures     Cryotherapy, skin lesion     Standing Status:   Future     Standing Expiration Date:   11/28/2019     Glycosylated Hemoglobin A1c     There are no discontinued medications.    Return in about 4 months (around 3/27/2019) for for a full review of medications and lab testing.    CHIEF COMPLAINT:  Chief Complaint   Patient presents with     Abrasion       HISTORY OF PRESENT ILLNESS:  Berta is a 78 y.o. female presenting to the clinic today complaining of a lump on her scalp.  She scratched this a few days ago and it irritated and bled.  She is worried that it might be cancerous.  She has had seborrheic keratoses in the past which have been ablated with liquid nitrogen    Diabetes has been under good control.  She is having no hypoglycemia and otherwise feels well    REVIEW OF SYSTEMS:   No peripheral edema.  Scattered seborrheic keratoses.  Good energy.  Mild anxiety.  All other systems are negative.    PFSH:  She had a good Thanksgiving with family    TOBACCO USE:  Social History     Tobacco Use   Smoking Status Former Smoker   Smokeless Tobacco Never Used       VITALS:  Vitals:    11/27/18 1117   BP: 130/76   Patient Site: Left Arm   Patient Position: Sitting   Cuff Size: Adult Large   Pulse: 74   Resp: 14   SpO2: 99%   Weight: 183 lb (83 kg)   Height: 5'    CHLORIDE 107 108* 100   CO2 25 26 16*   BUN 35* 35* 34*   CREATININE 1.84* 1.83* 1.56*   GLUCOSE 225* 271* 644*   CALCIUM 8.6 9.5 9.1      Recent Labs   Lab 12/17/19  0430 12/16/19  2219 12/16/19  1350   SODIUM 139 142 133*   CHLORIDE 107 108* 100   CO2 25 26 16*   BUN 35* 35* 34*   CREATININE 1.84* 1.83* 1.56*   CALCIUM 8.6 9.5 9.1   ALBUMIN 2.9* 3.2* 3.4*   BILIRUBIN 0.4 0.3 0.5   ALKPT 70 85 117   GPT 14 13 19   AST 19 13 35   GLUCOSE 225* 271* 644*      Recent Labs   Lab 12/17/19  0430   WBC 16.7*   RBC 4.18*   HGB 12.0*   HCT 37.6*         Impression:  1.  Acute kidney injury.  Patient has elevated creatinine noted on laboratory testing.  He may have a component of underlying chronic kidney disease as well.  Differential for the acute kidney injury includes: Reduced intravascular volume; ATN due to infection; rhabdomyolysis; structural normality.  CT imaging does not reveal any evidence of obstruction.  Creatinine level today is similar to PM yesterday.  Urine output is adequate.   2.  Hyperkalemia.  This is resolved.  3.  Depressed bicarbonate level. This is resolved.  4.  Mental status change/fever.  Workup is ongoing.  Mental status is improved today.  5.  Hypertension.  6.  Type 2 diabetes mellitus with hyperglycemia.     Recommendations:  1.  Continue to monitor chemistries.  2.  Monitor I/O.  3.  Encourage oral fluid intake.   4.  Continue IVF.   "6\" (1.676 m)     Wt Readings from Last 3 Encounters:   11/27/18 183 lb (83 kg)   07/17/18 181 lb (82.1 kg)   01/08/18 190 lb 9.6 oz (86.5 kg)     Body mass index is 29.54 kg/m .    PHYSICAL EXAM:  Constitutional:  Reveals a pleasant nervous black woman.  Vitals:  Per nursing notes.    Irritated seborrheic keratosis noted right scalp in the hair  Cardiac:  Regular rate and rhythm without murmurs, rubs, or gallops. Carotids without bruits.   Legs no edema  Palpation of the radial pulse regular.  Lungs: Clear.  Respiratory effort normal.    Neurologic:  Cranial nerves II-XII intact.     Psychiatric:  Mood appropriate, memory intact.       ADDITIONAL HISTORY SUMMARIZED (2):   CARE EVERYWHERE/ EXTRA INFORMATION (1): None.   RADIOLOGY TESTS (1):   LABS (1): Ordered today  CARDIOLOGY/MEDICINE TESTS (1):   INDEPENDENT REVIEW (2 each): None.     Total data points:1    Time in: 11:39 AM  Time out: 11:51 AM    The visit lasted a total of 12 minutes face to face with the patient. Over 50% of the time was spent counseling and educating the patient about medications, medication adjustments, medication side effects, and lab testing.        MEDICATIONS:  Current Outpatient Medications   Medication Sig Dispense Refill     ascorbic acid (ASCORBIC ACID WITH LONNY HIPS) 500 MG tablet Take 500 mg by mouth daily.       aspirin 81 MG EC tablet Take 81 mg by mouth every evening.        calcium-vitamin D (CALCIUM-VITAMIN D) 500 mg(1,250mg) -200 unit per tablet Take 1 tablet by mouth 2 (two) times a day with meals.       cyanocobalamin (VITAMIN B-12) 250 MCG tablet Take 250 mcg by mouth daily.       hydroCHLOROthiazide (HYDRODIURIL) 50 MG tablet Take 1 tablet (50 mg total) by mouth daily. 90 tablet 3     KLOR-CON M20 20 mEq tablet TAKE 1 TABLET BY MOUTH 2 TIMES A DAY. 180 tablet 0     lisinopril (PRINIVIL,ZESTRIL) 20 MG tablet TAKE ONE TABLET BY MOUTH ONE TIME DAILY AT BEDTIME 90 tablet 3     metFORMIN (GLUCOPHAGE) 1000 MG tablet TAKE ONE " TABLET BY MOUTH TWICE DAILY WITH MEALS 180 tablet 3     potassium chloride (K-DUR,KLOR-CON) 20 MEQ tablet Take 2 tablets (40 mEq total) by mouth daily. 180 tablet 3     simvastatin (ZOCOR) 10 MG tablet Take 1 tablet (10 mg total) by mouth at bedtime. 90 tablet 3     No current facility-administered medications for this visit.

## 2021-06-23 ENCOUNTER — COMMUNICATION - HEALTHEAST (OUTPATIENT)
Dept: INTERNAL MEDICINE | Facility: CLINIC | Age: 81
End: 2021-06-23

## 2021-06-23 DIAGNOSIS — N18.30 CONTROLLED TYPE 2 DIABETES MELLITUS WITH STAGE 3 CHRONIC KIDNEY DISEASE, WITHOUT LONG-TERM CURRENT USE OF INSULIN (H): ICD-10-CM

## 2021-06-23 DIAGNOSIS — E11.22 CONTROLLED TYPE 2 DIABETES MELLITUS WITH STAGE 3 CHRONIC KIDNEY DISEASE, WITHOUT LONG-TERM CURRENT USE OF INSULIN (H): ICD-10-CM

## 2021-06-23 DIAGNOSIS — E87.6 HYPOKALEMIA: ICD-10-CM

## 2021-06-23 NOTE — TELEPHONE ENCOUNTER
RN cannot approve Refill Request    RN can NOT refill this medication PCP messaged that patient is overdue for Labs.     Avelina Mccall, [facility] Triage/Med Refill 1/16/2019    Requested Prescriptions   Pending Prescriptions Disp Refills     hydroCHLOROthiazide (HYDRODIURIL) 50 MG tablet [Pharmacy Med Name: HYDROCHLOROTHIAZIDE 50 MG TAB] 90 tablet 2     Sig: TAKE 1 TABLET BY MOUTH DAILY.    Diuretics/Combination Diuretics Refill Protocol  Failed - 1/16/2019  1:30 AM       Failed - Serum Potassium in past 12 months     No results found for: LN-POTASSIUM         Failed - Serum Sodium in past 12 months     No results found for: LN-SODIUM         Failed - Serum Creatinine in past 12 months     Creatinine   Date Value Ref Range Status   01/08/2018 1.32 (H) 0.60 - 1.10 mg/dL Final            Passed - Visit with PCP or prescribing provider visit in past 12 months    Last office visit with prescriber/PCP: 11/27/2018 Gomez Blevins MD OR same dept: 11/27/2018 Gomez Blevins MD OR same specialty: 11/27/2018 Gomez Blevins MD  Last physical: Visit date not found Last MTM visit: Visit date not found   Next visit within 3 mo: Visit date not found  Next physical within 3 mo: Visit date not found  Prescriber OR PCP: Gomez Blevins MD  Last diagnosis associated with med order: 1. Essential hypertension with goal blood pressure less than 140/90  - hydroCHLOROthiazide (HYDRODIURIL) 50 MG tablet [Pharmacy Med Name: HYDROCHLOROTHIAZIDE 50 MG TAB]; TAKE 1 TABLET BY MOUTH DAILY.  Dispense: 90 tablet; Refill: 2    If protocol passes may refill for 12 months if within 3 months of last provider visit (or a total of 15 months).            Passed - Blood pressure on file in past 12 months    BP Readings from Last 1 Encounters:   11/27/18 130/76

## 2021-06-24 ENCOUNTER — COMMUNICATION - HEALTHEAST (OUTPATIENT)
Dept: INTERNAL MEDICINE | Facility: CLINIC | Age: 81
End: 2021-06-24

## 2021-06-24 DIAGNOSIS — E87.6 HYPOKALEMIA: ICD-10-CM

## 2021-06-24 DIAGNOSIS — E11.22 CONTROLLED TYPE 2 DIABETES MELLITUS WITH STAGE 3 CHRONIC KIDNEY DISEASE, WITHOUT LONG-TERM CURRENT USE OF INSULIN (H): ICD-10-CM

## 2021-06-24 DIAGNOSIS — N18.30 CONTROLLED TYPE 2 DIABETES MELLITUS WITH STAGE 3 CHRONIC KIDNEY DISEASE, WITHOUT LONG-TERM CURRENT USE OF INSULIN (H): ICD-10-CM

## 2021-06-24 RX ORDER — POTASSIUM CHLORIDE 1500 MG/1
40 TABLET, EXTENDED RELEASE ORAL DAILY
Qty: 28 TABLET | Refills: 0 | Status: SHIPPED | OUTPATIENT
Start: 2021-06-24 | End: 2021-08-06

## 2021-06-24 RX ORDER — SIMVASTATIN 10 MG
10 TABLET ORAL AT BEDTIME
Qty: 14 TABLET | Refills: 0 | Status: SHIPPED | OUTPATIENT
Start: 2021-06-24 | End: 2021-08-06

## 2021-06-24 NOTE — TELEPHONE ENCOUNTER
RN cannot approve Refill Request    RN can NOT refill this medication overdue for office visits and/or labs.    Yrn Parnell, [facility] Triage/Med Refill 2/21/2019    Requested Prescriptions   Pending Prescriptions Disp Refills     lisinopril (PRINIVIL,ZESTRIL) 20 MG tablet [Pharmacy Med Name: LISINOPRIL 20 MG TABLET] 90 tablet 2     Sig: TAKE ONE TABLET BY MOUTH ONE TIME DAILY AT BEDTIME    Ace Inhibitors Refill Protocol Failed - 2/21/2019  1:08 AM       Failed - Serum Potassium in past 12 months    No results found for: LN-POTASSIUM         Failed - Serum Creatinine in past 12 months    Creatinine   Date Value Ref Range Status   01/08/2018 1.32 (H) 0.60 - 1.10 mg/dL Final            Passed - PCP or prescribing provider visit in past 12 months      Last office visit with prescriber/PCP: 11/27/2018 Gomez Blevins MD OR same dept: 11/27/2018 Gomez Blevins MD OR same specialty: 11/27/2018 Gomez Blevins MD  Last physical: Visit date not found Last MTM visit: Visit date not found   Next visit within 3 mo: Visit date not found  Next physical within 3 mo: Visit date not found  Prescriber OR PCP: Gomez Blevins MD  Last diagnosis associated with med order: 1. Essential hypertension with goal blood pressure less than 140/90  - lisinopril (PRINIVIL,ZESTRIL) 20 MG tablet [Pharmacy Med Name: LISINOPRIL 20 MG TABLET]; TAKE ONE TABLET BY MOUTH ONE TIME DAILY AT BEDTIME  Dispense: 90 tablet; Refill: 2    If protocol passes may refill for 12 months if within 3 months of last provider visit (or a total of 15 months).            Passed - Blood pressure filed in past 12 months    BP Readings from Last 1 Encounters:   11/27/18 130/76

## 2021-06-24 NOTE — TELEPHONE ENCOUNTER
RN cannot approve Refill Request    RN can NOT refill this medication overdue for office visits and/or labs.    Yrn Parnell, [facility] Triage/Med Refill 3/12/2019    Requested Prescriptions   Pending Prescriptions Disp Refills     metFORMIN (GLUCOPHAGE) 1000 MG tablet [Pharmacy Med Name: METFORMIN HCL 1,000 MG TABLET] 180 tablet 2     Sig: TAKE ONE TABLET BY MOUTH TWICE DAILY WITH MEALS    Metformin Refill Protocol Failed - 3/10/2019  4:17 PM       Failed - GFR or Serum Creatinine in last 6 months    GFR MDRD Non Af Amer   Date Value Ref Range Status   01/08/2018 39 (L) >60 mL/min/1.73m2 Final     GFR MDRD Af Amer   Date Value Ref Range Status   01/08/2018 47 (L) >60 mL/min/1.73m2 Final            Passed - Blood pressure in last 12 months    BP Readings from Last 1 Encounters:   11/27/18 130/76            Passed - LFT or AST or ALT in last 12 months    Albumin   Date Value Ref Range Status   07/20/2016 3.9 3.5 - 5.0 g/dL Final     Bilirubin, Total   Date Value Ref Range Status   07/20/2016 0.6 0.2 - 1.0 mg/dL Final     Bilirubin, Direct   Date Value Ref Range Status   07/07/2010 0.12 <0.31 mg/dL Final     Alkaline Phosphatase   Date Value Ref Range Status   07/20/2016 52 40 - 112 U/L Final     Comment:       New Alkaline Phosphatase method with new reference ranges as of 2/18/15.     AST   Date Value Ref Range Status   07/17/2018 17 0 - 40 U/L Final     ALT   Date Value Ref Range Status   07/20/2016 17 12 - 78 U/L Final     Protein, Total   Date Value Ref Range Status   07/20/2016 6.8 6.4 - 8.2 g/dL Final               Passed - Visit with PCP or prescribing provider visit in last 6 months or next 3 months    Last office visit with prescriber/PCP: 11/27/2018 OR same dept: 11/27/2018 Gomez Blevins MD OR same specialty: 11/27/2018 Gomez Blevins MD Last physical: Visit date not found Last MTM visit: Visit date not found         Next appt within 3 mo: Visit date not found  Next physical within 3  mo: Visit date not found  Prescriber OR PCP: Gomez Blevins MD  Last diagnosis associated with med order: 1. DM II (diabetes mellitus, type II), controlled (H)  - metFORMIN (GLUCOPHAGE) 1000 MG tablet [Pharmacy Med Name: METFORMIN HCL 1,000 MG TABLET]; TAKE ONE TABLET BY MOUTH TWICE DAILY WITH MEALS  Dispense: 180 tablet; Refill: 2     If protocol passes may refill for 12 months if within 3 months of last provider visit (or a total of 15 months).          Passed - A1C in last 6 months    Hemoglobin A1c   Date Value Ref Range Status   11/27/2018 6.4 (H) 3.5 - 6.0 % Final              Passed - Microalbumin in last year     Microalbumin, Random Urine   Date Value Ref Range Status   11/27/2018 <0.50 0.00 - 1.99 mg/dL Final

## 2021-06-25 NOTE — TELEPHONE ENCOUNTER
RN cannot approve Refill Request    RN can NOT refill this medication PCP messaged that patient is overdue for Labs (due q 6 months per RN refill protocols).  Last office visit:  2/2/21    Loyda Valencia, [facility] Triage/Med Refill 6/17/2021    Requested Prescriptions   Pending Prescriptions Disp Refills     metFORMIN (GLUCOPHAGE) 1000 MG tablet [Pharmacy Med Name: METFORMIN HCL TABS 1000MG] 180 tablet 3     Sig: TAKE 1 TABLET TWICE A DAY WITH MEALS       Metformin Refill Protocol Failed - 6/16/2021 11:11 PM        Failed - GFR or Serum Creatinine in last 6 months     GFR MDRD Non Af Amer   Date Value Ref Range Status   02/05/2021 33 (L) >60 mL/min/1.73m2 Final     GFR MDRD Af Amer   Date Value Ref Range Status   02/05/2021 40 (L) >60 mL/min/1.73m2 Final             Passed - Blood pressure in last 12 months     BP Readings from Last 1 Encounters:   07/15/20 130/68             Passed - LFT or AST or ALT in last 12 months     Albumin   Date Value Ref Range Status   02/05/2021 4.0 3.5 - 5.0 g/dL Final     Bilirubin, Total   Date Value Ref Range Status   02/05/2021 0.6 0.0 - 1.0 mg/dL Final     Bilirubin, Direct   Date Value Ref Range Status   07/07/2010 0.12 <0.31 mg/dL Final     Alkaline Phosphatase   Date Value Ref Range Status   02/05/2021 54 45 - 120 U/L Final     AST   Date Value Ref Range Status   02/05/2021 20 0 - 40 U/L Final     ALT   Date Value Ref Range Status   02/05/2021 11 0 - 45 U/L Final     Protein, Total   Date Value Ref Range Status   02/05/2021 6.4 6.0 - 8.0 g/dL Final                Passed - Visit with PCP or prescribing provider visit in last 6 months or next 3 months     Last office visit with prescriber/PCP: Visit date not found OR same dept: 7/15/2020 Gomez Blevins MD OR same specialty: 7/15/2020 Gomez Blevins MD Last physical: Visit date not found Last MTM visit: Visit date not found         Next appt within 3 mo: Visit date not found  Next physical within 3 mo: Visit  date not found  Prescriber OR PCP: Gomez Blevins MD  Last diagnosis associated with med order: 1. Controlled type 2 diabetes mellitus with stage 3 chronic kidney disease, without long-term current use of insulin (H)  - metFORMIN (GLUCOPHAGE) 1000 MG tablet [Pharmacy Med Name: METFORMIN HCL TABS 1000MG]; TAKE 1 TABLET TWICE A DAY WITH MEALS  Dispense: 180 tablet; Refill: 3     If protocol passes may refill for 12 months if within 3 months of last provider visit (or a total of 15 months).           Passed - A1C in last 6 months     Hemoglobin A1c   Date Value Ref Range Status   02/05/2021 6.4 (H) <=5.6 % Final               Passed - Microalbumin in last year      Microalbumin, Random Urine   Date Value Ref Range Status   07/15/2020 0.88 0.00 - 1.99 mg/dL Final

## 2021-06-25 NOTE — TELEPHONE ENCOUNTER
Pt is calling in about a new mole she just noticed on her left breast on the nipple. Pt denies any redness, lumps, swelling, or fever. Pt denies any pain, but says it feels a bit hard, and looks dark.  Pt says it just looks like a new mole. Pt denies other symptoms, but says she does do self breast exams.   Per protocol, pt should be evaluated within 2 weeks.  Pt was advised by her PCP at her last appointment in November 2018 to go back and see her PCP again around 3/27/2019, so pt would also like to see her PCP for her medication and lab testing that is due at that time.   Pt was transferred to scheduling, and made an appointment with her PCP for Friday March 22, 2019.     Advised pt to call back if it starts to look infected, or if symptoms worsen or she develops other symptoms before she is seen in the clinic.    Richardson Bush RN [facility] Triage/Medication Refill.      Reason for Disposition    [1] Skin growth or mole AND [2] new    Protocols used: SKIN LESION - MOLES OR GROWTHS-A-

## 2021-06-26 ENCOUNTER — HEALTH MAINTENANCE LETTER (OUTPATIENT)
Age: 81
End: 2021-06-26

## 2021-06-26 NOTE — TELEPHONE ENCOUNTER
"Patient is calling again to request a small supply called to [facility] To hold over until mail order RX\"s arrive.  Patient is currently out of medicine.  "

## 2021-06-26 NOTE — TELEPHONE ENCOUNTER
Reason for Call:  Other call back      Detailed comments: pt calling about the status of her refill for:   Simvastatin and her Potassium    Has been waiting for about 1 week    Phone Number Patient can be reached at:   Cell number on file:    Telephone Information:   Mobile [phone]       Best Time: anytime    Can we leave a detailed message on this number?: Yes    Call taken on 6/23/2021 at 4:18 PM by Elly Shaw

## 2021-06-26 NOTE — TELEPHONE ENCOUNTER
Refill request for simvastatin to mail order pharmacy.    Requested Prescriptions     Pending Prescriptions Disp Refills     simvastatin (ZOCOR) 10 MG tablet 90 tablet 3     Sig: Take 1 tablet (10 mg total) by mouth at bedtime.

## 2021-06-26 NOTE — TELEPHONE ENCOUNTER
Reason for Call:  Other call back      Detailed comments: Pt requesting PCP to write a RX for a small amount of Simvastatin and Klor-Con until her Rx from [facility] gets delivered to her    Please send to [facility] - [address]    Phone Number Patient can be reached at:   Cell number on file:    Telephone Information:   Mobile [phone]       Best Time: anytime    Can we leave a detailed message on this number?: Yes    Call taken on 6/24/2021 at 10:42 AM by Elly Shaw

## 2021-07-03 NOTE — ADDENDUM NOTE
Addendum Note by Sreekanth Blackmon CMA at 4/23/2020  4:46 PM     Author: Sreekanth Blackmon CMA Service: -- Author Type: Certified Medical Assistant    Filed: 4/23/2020  4:46 PM Encounter Date: 4/20/2020 Status: Signed    : Sreekanth Blackmon CMA (Certified Medical Assistant)    Addended by: SREEKANTH BLACKMON on: 4/23/2020 04:46 PM        Modules accepted: Orders

## 2021-07-03 NOTE — ADDENDUM NOTE
Addendum Note by Monica Blas CMA at 4/23/2020  3:56 PM     Author: Monica Blas CMA Service: -- Author Type: Certified Medical Assistant    Filed: 4/23/2020  3:56 PM Encounter Date: 4/20/2020 Status: Signed    : Monica Blas CMA (Certified Medical Assistant)    Addended by: MONICA BLAS on: 4/23/2020 03:56 PM        Modules accepted: Orders

## 2021-07-04 NOTE — ADDENDUM NOTE
Addendum Note by Philomena Griffin LPN at 6/23/2021  5:06 PM     Author: Philomena Griffin LPN Service: -- Author Type: Licensed Nurse    Filed: 6/23/2021  5:06 PM Encounter Date: 6/23/2021 Status: Signed    : Philomena Griffin LPN (Licensed Nurse)    Addended by: PHILOMENA GRIFFIN on: 6/23/2021 05:06 PM        Modules accepted: Orders

## 2021-08-06 ENCOUNTER — OFFICE VISIT (OUTPATIENT)
Dept: INTERNAL MEDICINE | Facility: CLINIC | Age: 81
End: 2021-08-06
Payer: COMMERCIAL

## 2021-08-06 VITALS
WEIGHT: 189.3 LBS | HEART RATE: 64 BPM | HEIGHT: 66 IN | BODY MASS INDEX: 30.42 KG/M2 | DIASTOLIC BLOOD PRESSURE: 80 MMHG | OXYGEN SATURATION: 99 % | RESPIRATION RATE: 18 BRPM | SYSTOLIC BLOOD PRESSURE: 138 MMHG

## 2021-08-06 DIAGNOSIS — E11.22 CONTROLLED TYPE 2 DIABETES MELLITUS WITH STAGE 3 CHRONIC KIDNEY DISEASE, WITHOUT LONG-TERM CURRENT USE OF INSULIN (H): Primary | ICD-10-CM

## 2021-08-06 DIAGNOSIS — Z13.220 SCREENING FOR HYPERLIPIDEMIA: ICD-10-CM

## 2021-08-06 DIAGNOSIS — N18.30 CONTROLLED TYPE 2 DIABETES MELLITUS WITH STAGE 3 CHRONIC KIDNEY DISEASE, WITHOUT LONG-TERM CURRENT USE OF INSULIN (H): Primary | ICD-10-CM

## 2021-08-06 DIAGNOSIS — Z00.00 ENCOUNTER FOR PREVENTIVE CARE: ICD-10-CM

## 2021-08-06 DIAGNOSIS — I10 ESSENTIAL HYPERTENSION WITH GOAL BLOOD PRESSURE LESS THAN 140/90: ICD-10-CM

## 2021-08-06 PROCEDURE — 99207 PR NO CHARGE LOS: CPT | Performed by: INTERNAL MEDICINE

## 2021-08-06 RX ORDER — HYDROCHLOROTHIAZIDE 25 MG/1
25 TABLET ORAL DAILY
Qty: 90 TABLET | Refills: 3 | Status: SHIPPED | OUTPATIENT
Start: 2021-08-06 | End: 2021-09-15

## 2021-08-06 ASSESSMENT — MIFFLIN-ST. JEOR: SCORE: 1345.41

## 2021-08-15 ENCOUNTER — HEALTH MAINTENANCE LETTER (OUTPATIENT)
Age: 81
End: 2021-08-15

## 2021-08-17 ENCOUNTER — TRANSFERRED RECORDS (OUTPATIENT)
Dept: HEALTH INFORMATION MANAGEMENT | Facility: CLINIC | Age: 81
End: 2021-08-17

## 2021-08-17 LAB — RETINOPATHY: NEGATIVE

## 2021-08-26 ENCOUNTER — VIRTUAL VISIT (OUTPATIENT)
Dept: URGENT CARE | Facility: CLINIC | Age: 81
End: 2021-08-26
Payer: COMMERCIAL

## 2021-08-26 ENCOUNTER — LAB (OUTPATIENT)
Dept: LAB | Facility: CLINIC | Age: 81
End: 2021-08-26
Attending: EMERGENCY MEDICINE

## 2021-08-26 DIAGNOSIS — Z20.822 EXPOSURE TO 2019 NOVEL CORONAVIRUS: Primary | ICD-10-CM

## 2021-08-26 DIAGNOSIS — Z20.822 EXPOSURE TO 2019 NOVEL CORONAVIRUS: ICD-10-CM

## 2021-08-26 PROCEDURE — U0005 INFEC AGEN DETEC AMPLI PROBE: HCPCS

## 2021-08-26 PROCEDURE — 99212 OFFICE O/P EST SF 10 MIN: CPT | Mod: TEL | Performed by: EMERGENCY MEDICINE

## 2021-08-26 PROCEDURE — U0003 INFECTIOUS AGENT DETECTION BY NUCLEIC ACID (DNA OR RNA); SEVERE ACUTE RESPIRATORY SYNDROME CORONAVIRUS 2 (SARS-COV-2) (CORONAVIRUS DISEASE [COVID-19]), AMPLIFIED PROBE TECHNIQUE, MAKING USE OF HIGH THROUGHPUT TECHNOLOGIES AS DESCRIBED BY CMS-2020-01-R: HCPCS

## 2021-08-26 NOTE — PROGRESS NOTES
Phone appointment:    Assessment: Exposed to a Covid positive person about 1 week ago.  No Covid symptoms to date.    Plan: Covid PCR ordered.  Testing team to follow.    CHIEF COMPLAINT: Covid exposure.      HPI: Patient is an 81-year-old female who was exposed to a visitor 1 week ago who tested positive for Covid.  She has no Covid symptoms to date.  She is fully vaccinated      ROS: See HPI otherwise normal.    Allergies   Allergen Reactions     Penicillins Hives      Current Outpatient Medications   Medication Sig Dispense Refill     ascorbic acid (ASCORBIC ACID WITH LONNY HIPS) 500 MG tablet [ASCORBIC ACID (ASCORBIC ACID WITH LONNY HIPS) 500 MG TABLET] Take 500 mg by mouth daily.       aspirin 81 MG EC tablet [ASPIRIN 81 MG EC TABLET] Take 81 mg by mouth every evening.        calcium-vitamin D (CALCIUM-VITAMIN D) 500 mg(1,250mg) -200 unit per tablet [CALCIUM-VITAMIN D (CALCIUM-VITAMIN D) 500 MG(1,250MG) -200 UNIT PER TABLET] Take 1 tablet by mouth 2 (two) times a day with meals.       cyanocobalamin (VITAMIN B-12) 250 MCG tablet [CYANOCOBALAMIN (VITAMIN B-12) 250 MCG TABLET] Take 250 mcg by mouth daily.       ferrous sulfate 325 (65 FE) MG tablet [FERROUS SULFATE 325 (65 FE) MG TABLET] Take 1 tablet by mouth daily with breakfast.       hydrochlorothiazide (HYDRODIURIL) 25 MG tablet Take 1 tablet (25 mg) by mouth daily 90 tablet 3     KLOR-CON M20 20 mEq tablet [KLOR-CON M20 20 MEQ TABLET] Take 2 tablets (40 mEq total) by mouth daily. 180 tablet 3     lisinopriL (PRINIVIL,ZESTRIL) 20 MG tablet [LISINOPRIL (PRINIVIL,ZESTRIL) 20 MG TABLET] Take 1 tablet (20 mg total) by mouth at bedtime. 90 tablet 3     metFORMIN (GLUCOPHAGE) 1000 MG tablet [METFORMIN (GLUCOPHAGE) 1000 MG TABLET] TAKE ONE TABLET BY MOUTH TWICE DAILY WITH MEALS 180 tablet 3     simvastatin (ZOCOR) 10 MG tablet [SIMVASTATIN (ZOCOR) 10 MG TABLET] Take 1 tablet (10 mg total) by mouth at bedtime. 90 tablet 3         PE: No acute distress on the phone.   Alert.  Nondyspneic sounding.        TREATMENT: None.      ASSESSMENT: Covid exposure.  Asymptomatic.      DIAGNOSIS: Covid exposure.      PLAN: Covid PCR ordered.    Time:  5 minutes

## 2021-08-27 LAB — SARS-COV-2 RNA RESP QL NAA+PROBE: NEGATIVE

## 2021-08-30 ENCOUNTER — LAB (OUTPATIENT)
Dept: LAB | Facility: CLINIC | Age: 81
End: 2021-08-30

## 2021-08-30 DIAGNOSIS — E11.22 CONTROLLED TYPE 2 DIABETES MELLITUS WITH STAGE 3 CHRONIC KIDNEY DISEASE, WITHOUT LONG-TERM CURRENT USE OF INSULIN (H): ICD-10-CM

## 2021-08-30 DIAGNOSIS — N18.30 CONTROLLED TYPE 2 DIABETES MELLITUS WITH STAGE 3 CHRONIC KIDNEY DISEASE, WITHOUT LONG-TERM CURRENT USE OF INSULIN (H): ICD-10-CM

## 2021-08-30 LAB
CHOLEST SERPL-MCNC: 156 MG/DL
FASTING STATUS PATIENT QL REPORTED: NO
HBA1C MFR BLD: 6.4 % (ref 0–5.6)
HDLC SERPL-MCNC: 65 MG/DL
LDLC SERPL CALC-MCNC: 68 MG/DL
TRIGL SERPL-MCNC: 117 MG/DL

## 2021-08-30 PROCEDURE — 83036 HEMOGLOBIN GLYCOSYLATED A1C: CPT

## 2021-08-30 PROCEDURE — 36415 COLL VENOUS BLD VENIPUNCTURE: CPT

## 2021-08-30 PROCEDURE — 80061 LIPID PANEL: CPT

## 2021-09-14 ENCOUNTER — TELEPHONE (OUTPATIENT)
Dept: INTERNAL MEDICINE | Facility: CLINIC | Age: 81
End: 2021-09-14

## 2021-09-14 DIAGNOSIS — I10 ESSENTIAL HYPERTENSION WITH GOAL BLOOD PRESSURE LESS THAN 140/90: ICD-10-CM

## 2021-09-14 NOTE — TELEPHONE ENCOUNTER
Reason for Call:  Other call back    Detailed comments: Pt requesting a small quanity of:  Hydrochlorathyazide #14 - her original Rx lost in mail    Pharm:  [facility] - [address]    Phone Number Patient can be reached at: Cell number on file:    Telephone Information:   Mobile [phone]       Best Time: anytime    Can we leave a detailed message on this number? YES    Call taken on 9/14/2021 at 11:33 AM by Elly Shaw

## 2021-09-15 RX ORDER — HYDROCHLOROTHIAZIDE 25 MG/1
25 TABLET ORAL DAILY
Qty: 14 TABLET | Refills: 1 | Status: SHIPPED | OUTPATIENT
Start: 2021-09-15 | End: 2022-08-12

## 2021-10-05 DIAGNOSIS — I10 ESSENTIAL HYPERTENSION WITH GOAL BLOOD PRESSURE LESS THAN 140/90: ICD-10-CM

## 2021-10-05 RX ORDER — LISINOPRIL 20 MG/1
20 TABLET ORAL AT BEDTIME
Qty: 90 TABLET | Refills: 3 | Status: SHIPPED | OUTPATIENT
Start: 2021-10-05 | End: 2022-09-12

## 2021-10-05 NOTE — TELEPHONE ENCOUNTER
"Routing refill request to provider for review/approval because:  Labs out of range:  Elevated serum Creatinine.    Last Written Prescription Date:  07/15/2020  Last Fill Quantity: 90,  # refills: 3   Last office visit provider:  08/06/2021 with Dr Blevins.    Requested Prescriptions   Pending Prescriptions Disp Refills     lisinopril (ZESTRIL) 20 MG tablet 90 tablet 3     Sig: Take 1 tablet (20 mg) by mouth At Bedtime       ACE Inhibitors (Including Combos) Protocol Failed - 10/5/2021  9:21 AM        Failed - Normal serum creatinine on file in past 12 months     Recent Labs   Lab Test 02/05/21  1031   CR 1.52*       Ok to refill medication if creatinine is low          Passed - Blood pressure under 140/90 in past 12 months     BP Readings from Last 3 Encounters:   08/06/21 138/80   07/15/20 130/68   09/11/19 138/80                 Passed - Recent (12 mo) or future (30 days) visit within the authorizing provider's specialty     Patient has had an office visit with the authorizing provider or a provider within the authorizing providers department within the previous 12 mos or has a future within next 30 days. See \"Patient Info\" tab in inbasket, or \"Choose Columns\" in Meds & Orders section of the refill encounter.              Passed - Medication is active on med list        Passed - Patient is age 18 or older        Passed - No active pregnancy on record        Passed - Normal serum potassium on file in past 12 months     Recent Labs   Lab Test 02/05/21  1031   POTASSIUM 3.7             Passed - No positive pregnancy test within past 12 months             Mary Lou Patel 10/05/21 3:18 PM  "

## 2021-10-05 NOTE — TELEPHONE ENCOUNTER
Reason for Call:  Medication or medication refill:    Do you use a [facility]?  Name of the pharmacy and phone number for the current request:    [facility]    Name of the medication requested:   Lisinopril    Other request: n/a    Can we leave a detailed message on this number? YES    Phone number patient can be reached at: Cell number on file:    Telephone Information:   Mobile [phone]       Best Time: anytime    Call taken on 10/5/2021 at 9:11 AM by Elly Shaw

## 2021-10-11 ENCOUNTER — LAB (OUTPATIENT)
Dept: LAB | Facility: CLINIC | Age: 81
End: 2021-10-11
Payer: COMMERCIAL

## 2021-10-11 ENCOUNTER — HEALTH MAINTENANCE LETTER (OUTPATIENT)
Age: 81
End: 2021-10-11

## 2021-10-11 DIAGNOSIS — E11.22 CONTROLLED TYPE 2 DIABETES MELLITUS WITH STAGE 3 CHRONIC KIDNEY DISEASE, WITHOUT LONG-TERM CURRENT USE OF INSULIN (H): ICD-10-CM

## 2021-10-11 DIAGNOSIS — N18.30 CONTROLLED TYPE 2 DIABETES MELLITUS WITH STAGE 3 CHRONIC KIDNEY DISEASE, WITHOUT LONG-TERM CURRENT USE OF INSULIN (H): ICD-10-CM

## 2021-10-11 LAB
CREAT UR-MCNC: 123 MG/DL
MICROALBUMIN UR-MCNC: 1.25 MG/DL (ref 0–1.99)
MICROALBUMIN/CREAT UR: 10.2 MG/G CR

## 2021-10-11 PROCEDURE — 82043 UR ALBUMIN QUANTITATIVE: CPT

## 2021-10-13 ENCOUNTER — TELEPHONE (OUTPATIENT)
Dept: INTERNAL MEDICINE | Facility: CLINIC | Age: 81
End: 2021-10-13

## 2021-10-13 DIAGNOSIS — N18.30 CONTROLLED TYPE 2 DIABETES MELLITUS WITH STAGE 3 CHRONIC KIDNEY DISEASE, WITHOUT LONG-TERM CURRENT USE OF INSULIN (H): ICD-10-CM

## 2021-10-13 DIAGNOSIS — E11.22 CONTROLLED TYPE 2 DIABETES MELLITUS WITH STAGE 3 CHRONIC KIDNEY DISEASE, WITHOUT LONG-TERM CURRENT USE OF INSULIN (H): ICD-10-CM

## 2021-10-13 RX ORDER — SIMVASTATIN 10 MG
10 TABLET ORAL AT BEDTIME
Qty: 90 TABLET | Refills: 3 | Status: SHIPPED | OUTPATIENT
Start: 2021-10-13 | End: 2021-10-25

## 2021-10-13 NOTE — TELEPHONE ENCOUNTER
Reason for Call:  Medication or medication refill:    Do you use a [facility]?  Name of the pharmacy and phone number for the current request:    [facility]    Name of the medication requested:   Simvastatin    Other request: n/a    Can we leave a detailed message on this number? YES    Phone number patient can be reached at: Cell number on file:    Telephone Information:   Mobile [phone]       Best Time: anytime    Call taken on 10/13/2021 at 9:47 AM by Elly Shaw

## 2021-10-14 ENCOUNTER — TELEPHONE (OUTPATIENT)
Dept: INTERNAL MEDICINE | Facility: CLINIC | Age: 81
End: 2021-10-14

## 2021-10-14 NOTE — TELEPHONE ENCOUNTER
Reason for Call:  Other call back    Detailed comments: Pt is calling to check on if she should still be taking Asprin every day.  She saw it on the news that it is not recommended any longer.    Please call pt and let her know.  Her mychart is not working so please call    Phone Number Patient can be reached at: Cell number on file:    Telephone Information:   Mobile [phone]       Best Time: any    Can we leave a detailed message on this number? YES    Call taken on 10/14/2021 at 2:23 PM by Pam J. Behr

## 2021-10-15 NOTE — TELEPHONE ENCOUNTER
Writer spoke with:  Birdie  Message relayed from provider:    Gomez Blevins MD 1 hour ago (10:24 AM)     WB       Okay to stop aspirin          Any further questions/comments/concerns? NO

## 2021-10-25 ENCOUNTER — TELEPHONE (OUTPATIENT)
Dept: INTERNAL MEDICINE | Facility: CLINIC | Age: 81
End: 2021-10-25

## 2021-10-25 DIAGNOSIS — E11.22 CONTROLLED TYPE 2 DIABETES MELLITUS WITH STAGE 3 CHRONIC KIDNEY DISEASE, WITHOUT LONG-TERM CURRENT USE OF INSULIN (H): ICD-10-CM

## 2021-10-25 DIAGNOSIS — N18.30 CONTROLLED TYPE 2 DIABETES MELLITUS WITH STAGE 3 CHRONIC KIDNEY DISEASE, WITHOUT LONG-TERM CURRENT USE OF INSULIN (H): ICD-10-CM

## 2021-10-25 RX ORDER — SIMVASTATIN 10 MG
10 TABLET ORAL AT BEDTIME
Qty: 7 TABLET | Refills: 0 | Status: SHIPPED | OUTPATIENT
Start: 2021-10-25 | End: 2021-12-17

## 2021-10-25 NOTE — TELEPHONE ENCOUNTER
Reason for Call:  Other call back    Detailed comments: Pt requesting PCP to call into local pharmacy for a few Simvastatin tabs to hold her over unitl her Rx comes from [facility]    Pharm:  [facility]    Phone Number Patient can be reached at: Cell number on file:    Telephone Information:   Mobile [phone]       Best Time: anytime    Can we leave a detailed message on this number? YES    Call taken on 10/25/2021 at 11:31 AM by Elly Shaw

## 2021-12-17 ENCOUNTER — VIRTUAL VISIT (OUTPATIENT)
Dept: INTERNAL MEDICINE | Facility: CLINIC | Age: 81
End: 2021-12-17
Payer: COMMERCIAL

## 2021-12-17 DIAGNOSIS — N18.30 CONTROLLED TYPE 2 DIABETES MELLITUS WITH STAGE 3 CHRONIC KIDNEY DISEASE, WITHOUT LONG-TERM CURRENT USE OF INSULIN (H): ICD-10-CM

## 2021-12-17 DIAGNOSIS — I10 ESSENTIAL HYPERTENSION WITH GOAL BLOOD PRESSURE LESS THAN 140/90: ICD-10-CM

## 2021-12-17 DIAGNOSIS — E11.22 CONTROLLED TYPE 2 DIABETES MELLITUS WITH STAGE 3 CHRONIC KIDNEY DISEASE, WITHOUT LONG-TERM CURRENT USE OF INSULIN (H): ICD-10-CM

## 2021-12-17 DIAGNOSIS — N18.32 STAGE 3B CHRONIC KIDNEY DISEASE (H): Primary | ICD-10-CM

## 2021-12-17 PROCEDURE — 99213 OFFICE O/P EST LOW 20 MIN: CPT | Mod: 95 | Performed by: INTERNAL MEDICINE

## 2021-12-17 RX ORDER — SIMVASTATIN 10 MG
10 TABLET ORAL AT BEDTIME
Qty: 90 TABLET | Refills: 3 | Status: SHIPPED | OUTPATIENT
Start: 2021-12-17 | End: 2023-02-20

## 2021-12-17 NOTE — PROGRESS NOTES
Berta is a 81 year old female being evaluated via a billable phone visit, and would like to be contacted via the following  Cell number on file:    Telephone Information:   Mobile [phone]        ASSESSMENT:  DX: 1. Stage 3b chronic kidney disease (H)  Reviewed creatinine over the last few years and change from nifedipine to hydrochlorothiazide 2016.  Dose of hydrochlorothiazide decreased in February and have not yet been able to follow-up.  Update labs and continue, possibly even stopping hydrochlorothiazide  - Comprehensive metabolic panel; Future  - Albumin Random Urine Quantitative with Creat Ratio; Future  - UA reflex to Microscopic and Culture; Future    2. Controlled type 2 diabetes mellitus with stage 3 chronic kidney disease, without long-term current use of insulin (H)  Stable able.  Mild.  Update lab  - Hemoglobin A1c; Future  - simvastatin (ZOCOR) 10 MG tablet; Take 1 tablet (10 mg) by mouth At Bedtime  Dispense: 90 tablet; Refill: 3    3. Essential hypertension with goal blood pressure less than 140/90  Blood pressure and edema stable despite decrease in dose in February       Preventive Care Assessed: Vaccinations up-to-date    PLAN:  Patient Instructions   Medicines clarified and simvastatin refilled    Decrease hydrochlorothiazide to 25 mg daily-done in February    Update labs with focus on chronic kidney disease and urine    Update glycosylated hemoglobin    Vaccinations reviewed     Return in about 6 months (around 6/17/2022) for using a video visit.    CHIEF COMPLAINT:  Chief Complaint   Patient presents with     Follow Up     Would like labs done     Medication Update     Would like to discuss meds with pcp        HISTORY OF PRESENT ILLNESS:  Berta is a 81 year old female contacting the clinic today via phone for concern regarding kidneys.  In February, creatinine was addressed and hydrochlorothiazide was decreased from 50 mg to 25 mg daily.  Hydrochlorothiazide had been initiated in 2016  "to replace nifedipine.  Since that time and despite her dose decreased, edema has not recurred and blood pressure has been stable.  She came into the office for a visit in August but had to leave early so labs were not done at that time.  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  There were no vitals taken for this visit. Estimated body mass index is 30.55 kg/m  as calculated from the following:    Height as of 8/6/21: 1.676 m (5' 6\").    Weight as of 8/6/21: 85.9 kg (189 lb 4.8 oz).    PHYSICAL EXAM:  (observations via Phone)  Alert and oriented    MEDICATIONS  Current Outpatient Medications   Medication Sig Dispense Refill     Multiple Vitamins-Minerals (MULTIVITAMIN ADULTS PO) Take by mouth daily       simvastatin (ZOCOR) 10 MG tablet Take 1 tablet (10 mg) by mouth At Bedtime 90 tablet 3     cyanocobalamin (VITAMIN B-12) 250 MCG tablet [CYANOCOBALAMIN (VITAMIN B-12) 250 MCG TABLET] Take 250 mcg by mouth daily. (Patient not taking: Reported on 12/17/2021)       ferrous sulfate 325 (65 FE) MG tablet [FERROUS SULFATE 325 (65 FE) MG TABLET] Take 1 tablet by mouth daily with breakfast. (Patient not taking: Reported on 12/17/2021)       hydrochlorothiazide (HYDRODIURIL) 25 MG tablet Take 1 tablet (25 mg) by mouth daily 14 tablet 1     KLOR-CON M20 20 mEq tablet [KLOR-CON M20 20 MEQ TABLET] Take 2 tablets (40 mEq total) by mouth daily. 180 tablet 3     lisinopril (ZESTRIL) 20 MG tablet Take 1 tablet (20 mg) by mouth At Bedtime 90 tablet 3     metFORMIN " (GLUCOPHAGE) 1000 MG tablet [METFORMIN (GLUCOPHAGE) 1000 MG TABLET] TAKE ONE TABLET BY MOUTH TWICE DAILY WITH MEALS 180 tablet 3       Notes summarized:   Labs, x-rays, cardiology, GI tests reviewed: Reviewed.  Taking iron  Recent Labs   Lab Test 08/30/21  1535 02/05/21  1031 07/28/20  1324 07/15/20  1647   HGB  --  11.9* 11.5*  --    NA  --  142  --  140   POTASSIUM  --  3.7  --  3.5   CR  --  1.52*  --  1.74*   A1C 6.4* 6.4*  --  6.4*   B12  --   --  716  --    TSH  --   --   --  0.57     Lab Results   Component Value Date    QOEBI44MAV Negative 08/26/2021     No components found for: VITD  No results found for: VITDT  Lab Results   Component Value Date    CHOL 156 08/30/2021     New orders:   Orders Placed This Encounter   Procedures     Hemoglobin A1c     Comprehensive metabolic panel     Albumin Random Urine Quantitative with Creat Ratio     UA reflex to Microscopic and Culture       Independent review of:  Supplemental history by:      Patient would like to receive their AVS by Mail a copy    Gomez Blevins MD  [facility]    Phone Start Time: 4:08 PM  Phone End time:  4:28 PM  Conversation plus orders: 20 minutes  Dictation time:  3 minutes    The visit lasted a total of 21 minutes

## 2021-12-17 NOTE — PATIENT INSTRUCTIONS
Medicines clarified and simvastatin refilled    Decrease hydrochlorothiazide to 25 mg daily-done in February    Update labs with focus on chronic kidney disease and urine    Update glycosylated hemoglobin    Vaccinations reviewed

## 2021-12-20 ENCOUNTER — TELEPHONE (OUTPATIENT)
Dept: INTERNAL MEDICINE | Facility: CLINIC | Age: 81
End: 2021-12-20
Payer: COMMERCIAL

## 2021-12-20 NOTE — TELEPHONE ENCOUNTER
----- Message from Gomez Blevins MD sent at 12/17/2021  4:24 PM CST -----  Please schedule lab appointment

## 2022-01-10 ENCOUNTER — LAB (OUTPATIENT)
Dept: LAB | Facility: CLINIC | Age: 82
End: 2022-01-10
Payer: COMMERCIAL

## 2022-01-10 ENCOUNTER — DOCUMENTATION ONLY (OUTPATIENT)
Dept: LAB | Facility: CLINIC | Age: 82
End: 2022-01-10

## 2022-01-10 DIAGNOSIS — N18.30 CONTROLLED TYPE 2 DIABETES MELLITUS WITH STAGE 3 CHRONIC KIDNEY DISEASE, WITHOUT LONG-TERM CURRENT USE OF INSULIN (H): ICD-10-CM

## 2022-01-10 DIAGNOSIS — N18.32 STAGE 3B CHRONIC KIDNEY DISEASE (H): Primary | ICD-10-CM

## 2022-01-10 DIAGNOSIS — N18.32 STAGE 3B CHRONIC KIDNEY DISEASE (H): ICD-10-CM

## 2022-01-10 DIAGNOSIS — E11.22 CONTROLLED TYPE 2 DIABETES MELLITUS WITH STAGE 3 CHRONIC KIDNEY DISEASE, WITHOUT LONG-TERM CURRENT USE OF INSULIN (H): ICD-10-CM

## 2022-01-10 LAB
ALBUMIN SERPL-MCNC: 4 G/DL (ref 3.5–5)
ALP SERPL-CCNC: 67 U/L (ref 45–120)
ALT SERPL W P-5'-P-CCNC: 15 U/L (ref 0–45)
ANION GAP SERPL CALCULATED.3IONS-SCNC: 11 MMOL/L (ref 5–18)
AST SERPL W P-5'-P-CCNC: 20 U/L (ref 0–40)
BILIRUB SERPL-MCNC: 0.6 MG/DL (ref 0–1)
BUN SERPL-MCNC: 17 MG/DL (ref 8–28)
CALCIUM SERPL-MCNC: 10 MG/DL (ref 8.5–10.5)
CHLORIDE BLD-SCNC: 104 MMOL/L (ref 98–107)
CO2 SERPL-SCNC: 28 MMOL/L (ref 22–31)
CREAT SERPL-MCNC: 1.49 MG/DL (ref 0.6–1.1)
GFR SERPL CREATININE-BSD FRML MDRD: 35 ML/MIN/1.73M2
GLUCOSE BLD-MCNC: 177 MG/DL (ref 70–125)
HBA1C MFR BLD: 7.2 % (ref 0–5.6)
HGB BLD-MCNC: 12.2 G/DL (ref 11.7–15.7)
POTASSIUM BLD-SCNC: 3.7 MMOL/L (ref 3.5–5)
PROT SERPL-MCNC: 6.7 G/DL (ref 6–8)
SODIUM SERPL-SCNC: 143 MMOL/L (ref 136–145)

## 2022-01-10 PROCEDURE — 83036 HEMOGLOBIN GLYCOSYLATED A1C: CPT

## 2022-01-10 PROCEDURE — 36415 COLL VENOUS BLD VENIPUNCTURE: CPT

## 2022-01-10 PROCEDURE — 85018 HEMOGLOBIN: CPT

## 2022-01-10 PROCEDURE — 80053 COMPREHEN METABOLIC PANEL: CPT

## 2022-01-10 NOTE — PROGRESS NOTES
Berta Witt has an upcoming lab appointment and is eligible to have due Health Maintenance labs drawn per Health Maintenance Protocol Orders (HMPO) process.    Before it can be drawn, a diagnosis is needed for the following lab: Hemoglobin    Please review and enter diagnosis as appropriate.     Noemi Alcantara

## 2022-01-11 ENCOUNTER — APPOINTMENT (OUTPATIENT)
Dept: LAB | Facility: CLINIC | Age: 82
End: 2022-01-11
Payer: COMMERCIAL

## 2022-01-11 LAB
ALBUMIN UR-MCNC: NEGATIVE MG/DL
APPEARANCE UR: CLEAR
BACTERIA #/AREA URNS HPF: ABNORMAL /HPF
BILIRUB UR QL STRIP: NEGATIVE
COLOR UR AUTO: YELLOW
CREAT UR-MCNC: 40 MG/DL
GLUCOSE UR STRIP-MCNC: NEGATIVE MG/DL
HGB UR QL STRIP: NEGATIVE
KETONES UR STRIP-MCNC: NEGATIVE MG/DL
LEUKOCYTE ESTERASE UR QL STRIP: ABNORMAL
MICROALBUMIN UR-MCNC: 1.19 MG/DL (ref 0–1.99)
MICROALBUMIN/CREAT UR: 29.8 MG/G CR
NITRATE UR QL: NEGATIVE
PH UR STRIP: 7 [PH] (ref 5–8)
RBC #/AREA URNS AUTO: ABNORMAL /HPF
SP GR UR STRIP: 1.01 (ref 1–1.03)
SQUAMOUS #/AREA URNS AUTO: ABNORMAL /LPF
UROBILINOGEN UR STRIP-ACNC: 0.2 E.U./DL
WBC #/AREA URNS AUTO: ABNORMAL /HPF
WBC CLUMPS #/AREA URNS HPF: PRESENT /HPF

## 2022-01-11 PROCEDURE — 81001 URINALYSIS AUTO W/SCOPE: CPT

## 2022-01-11 PROCEDURE — 87086 URINE CULTURE/COLONY COUNT: CPT

## 2022-01-11 PROCEDURE — 82043 UR ALBUMIN QUANTITATIVE: CPT

## 2022-01-12 LAB — BACTERIA UR CULT: NORMAL

## 2022-01-13 DIAGNOSIS — N30.00 ACUTE CYSTITIS WITHOUT HEMATURIA: Primary | ICD-10-CM

## 2022-01-13 RX ORDER — SULFAMETHOXAZOLE/TRIMETHOPRIM 800-160 MG
1 TABLET ORAL 2 TIMES DAILY
Qty: 10 TABLET | Refills: 0 | Status: SHIPPED | OUTPATIENT
Start: 2022-01-13 | End: 2022-01-18

## 2022-01-14 ENCOUNTER — TELEPHONE (OUTPATIENT)
Dept: INTERNAL MEDICINE | Facility: CLINIC | Age: 82
End: 2022-01-14

## 2022-01-27 ENCOUNTER — TELEPHONE (OUTPATIENT)
Dept: INTERNAL MEDICINE | Facility: CLINIC | Age: 82
End: 2022-01-27
Payer: COMMERCIAL

## 2022-01-27 NOTE — TELEPHONE ENCOUNTER
Reason for Call:  Other call back    Detailed comments: pt is wondering how she could of gotten a UTI and also wondering if any of meds could give her this since she is on so many?  She also has question on her lab resutls she got in the mail.    Also wondering if she needs a repeat UA to make sure it is cleared up.    Phone Number Patient can be reached at: Cell number on file:    Telephone Information:   Mobile [phone]       Best Time: any    Can we leave a detailed message on this number? YES    Call taken on 1/27/2022 at 1:53 PM by Pam J. Behr

## 2022-01-28 NOTE — TELEPHONE ENCOUNTER
Contacted patient.  Patient presented with just some follow up questions in regards to her UTI she was diagnosed with 2 weeks ago when she had a UA done.  Patient was wondering if she need to come in for a repeat UA after the 5 day antibiotic that was prescribed. Writer advised patient that if she was experiencing symptoms of a UTI such as foul-smelling urine, frequent urination, painful urination then to contact clinic.     Patient also had questions in regards to her lab results she received, specifically her creatinine. Advised patient that if Dr. Blevins was concerned with the lab results either himself or one of the RN's from the clinic would contact her.     Patient had no further questions or concerns.

## 2022-03-30 ENCOUNTER — TELEPHONE (OUTPATIENT)
Dept: INTERNAL MEDICINE | Facility: CLINIC | Age: 82
End: 2022-03-30
Payer: COMMERCIAL

## 2022-03-30 NOTE — TELEPHONE ENCOUNTER
Reason for Call:  Other call back    Detailed comments: pt is calling and wanting to know once the 2nd booster is available to her does Dr Blevins think that she would be a good candidate to get one when available to her.    She is aware we are not giving to all pts yet.    Phone Number Patient can be reached at: Cell number on file:    Telephone Information:   Mobile [phone]       Best Time: any    Can we leave a detailed message on this number? YES    Call taken on 3/30/2022 at 12:15 PM by Pam J. Behr

## 2022-03-31 NOTE — TELEPHONE ENCOUNTER
Yes she would be a good candidate    However, I am not certain that a fourth shot of vaccine against the original virus from 2 years ago would be as helpful as a new vaccine adjusted for the new variants

## 2022-03-31 NOTE — TELEPHONE ENCOUNTER
Left pt detailed message that pcp agrees that she'd be a good candidate for the 2nd booster.  Advised pt that [facility] has not started giving them out yet but local pharmacies have, and she could contact them if interested.

## 2022-06-13 DIAGNOSIS — E11.22 CONTROLLED TYPE 2 DIABETES MELLITUS WITH STAGE 3 CHRONIC KIDNEY DISEASE, WITHOUT LONG-TERM CURRENT USE OF INSULIN (H): ICD-10-CM

## 2022-06-13 DIAGNOSIS — N18.30 CONTROLLED TYPE 2 DIABETES MELLITUS WITH STAGE 3 CHRONIC KIDNEY DISEASE, WITHOUT LONG-TERM CURRENT USE OF INSULIN (H): ICD-10-CM

## 2022-06-13 DIAGNOSIS — E87.6 HYPOKALEMIA: ICD-10-CM

## 2022-06-13 RX ORDER — POTASSIUM CHLORIDE 1500 MG/1
TABLET, EXTENDED RELEASE ORAL
Qty: 180 TABLET | Refills: 3 | Status: SHIPPED | OUTPATIENT
Start: 2022-06-13 | End: 2023-06-09

## 2022-06-13 NOTE — TELEPHONE ENCOUNTER
"Routing refill request to provider for review/approval because:  Labs out of range:  CR or GFR  A break in medication  KLOR-CON M20 20 mEq tablet  Last Written Prescription Date:  7/15/2020  Last Fill Quantity: 180,  # refills: 3   metFORMIN (GLUCOPHAGE) 1000 MG tablet  Last Written Prescription Date:  7/15/2020  Last Fill Quantity: 180,  # refills: 3   Last office visit provider:  12/17/21     Requested Prescriptions   Pending Prescriptions Disp Refills     metFORMIN (GLUCOPHAGE) 1000 MG tablet [Pharmacy Med Name: METFORMIN HCL TABS 1000MG] 180 tablet 3     Sig: TAKE 1 TABLET TWICE A DAY WITH MEALS       Biguanide Agents Failed - 6/13/2022 12:27 AM        Failed - Patient's CR is NOT>1.4 OR Patient's EGFR is NOT<45 within past 12 mos.     Recent Labs   Lab Test 01/10/22  1319 02/05/21  1031   GFRESTIMATED 35* 33*   GFRESTBLACK  --  40*       Recent Labs   Lab Test 01/10/22  1319   CR 1.49*             Passed - Patient is age 10 or older        Passed - Patient has documented A1c within the specified period of time.     If HgbA1C is 8 or greater, it needs to be on file within the past 3 months.  If less than 8, must be on file within the past 6 months.     Recent Labs   Lab Test 01/10/22  1319   A1C 7.2*             Passed - Patient does NOT have a diagnosis of CHF.        Passed - Medication is active on med list        Passed - Patient is not pregnant        Passed - Patient has not had a positive pregnancy test within the past 12 mos.         Passed - Recent (6 mo) or future (30 days) visit within the authorizing provider's specialty     Patient had office visit in the last 6 months or has a visit in the next 30 days with authorizing provider or within the authorizing provider's specialty.  See \"Patient Info\" tab in inbasket, or \"Choose Columns\" in Meds & Orders section of the refill encounter.               KLOR-CON 20 MEQ CR tablet [Pharmacy Med Name: KLOR-CON M20 ER TABS 20MEQ] 180 tablet 3     Sig: TAKE 2 " "TABLETS DAILY       Potassium Supplements Protocol Passed - 6/13/2022 12:27 AM        Passed - Recent (12 mo) or future (30 days) visit within the authorizing provider's department     Patient has had an office visit with the authorizing provider or a provider within the authorizing providers department within the previous 12 mos or has a future within next 30 days. See \"Patient Info\" tab in inbasket, or \"Choose Columns\" in Meds & Orders section of the refill encounter.              Passed - Medication is active on med list        Passed - Patient is age 18 or older        Passed - Normal serum potassium in past 12 months     Recent Labs   Lab Test 01/10/22  1319   POTASSIUM 3.7                         Deena Pacheco RN 06/13/22 1:02 PM  "

## 2022-07-17 ENCOUNTER — HEALTH MAINTENANCE LETTER (OUTPATIENT)
Age: 82
End: 2022-07-17

## 2022-08-03 ENCOUNTER — OFFICE VISIT (OUTPATIENT)
Dept: INTERNAL MEDICINE | Facility: CLINIC | Age: 82
End: 2022-08-03
Payer: COMMERCIAL

## 2022-08-03 VITALS
HEIGHT: 66 IN | TEMPERATURE: 97.7 F | DIASTOLIC BLOOD PRESSURE: 79 MMHG | WEIGHT: 175.3 LBS | SYSTOLIC BLOOD PRESSURE: 132 MMHG | OXYGEN SATURATION: 99 % | RESPIRATION RATE: 16 BRPM | BODY MASS INDEX: 28.17 KG/M2 | HEART RATE: 73 BPM

## 2022-08-03 DIAGNOSIS — Z13.220 SCREENING FOR HYPERLIPIDEMIA: ICD-10-CM

## 2022-08-03 DIAGNOSIS — E55.9 VITAMIN D DEFICIENCY: ICD-10-CM

## 2022-08-03 DIAGNOSIS — Z52.008 BLOOD DONOR: ICD-10-CM

## 2022-08-03 DIAGNOSIS — N18.30 CONTROLLED TYPE 2 DIABETES MELLITUS WITH STAGE 3 CHRONIC KIDNEY DISEASE, WITHOUT LONG-TERM CURRENT USE OF INSULIN (H): Primary | ICD-10-CM

## 2022-08-03 DIAGNOSIS — E11.22 CONTROLLED TYPE 2 DIABETES MELLITUS WITH STAGE 3 CHRONIC KIDNEY DISEASE, WITHOUT LONG-TERM CURRENT USE OF INSULIN (H): Primary | ICD-10-CM

## 2022-08-03 DIAGNOSIS — D64.9 ANEMIA, UNSPECIFIED TYPE: ICD-10-CM

## 2022-08-03 DIAGNOSIS — I10 ESSENTIAL HYPERTENSION WITH GOAL BLOOD PRESSURE LESS THAN 140/90: ICD-10-CM

## 2022-08-03 DIAGNOSIS — N18.32 STAGE 3B CHRONIC KIDNEY DISEASE (H): ICD-10-CM

## 2022-08-03 LAB
ALBUMIN SERPL BCG-MCNC: 4.4 G/DL (ref 3.5–5.2)
ALP SERPL-CCNC: 65 U/L (ref 35–104)
ALT SERPL W P-5'-P-CCNC: 14 U/L (ref 10–35)
ANION GAP SERPL CALCULATED.3IONS-SCNC: 11 MMOL/L (ref 7–15)
AST SERPL W P-5'-P-CCNC: 24 U/L (ref 10–35)
BASOPHILS # BLD AUTO: 0 10E3/UL (ref 0–0.2)
BASOPHILS NFR BLD AUTO: 0 %
BILIRUB SERPL-MCNC: 0.5 MG/DL
BUN SERPL-MCNC: 21 MG/DL (ref 8–23)
CALCIUM SERPL-MCNC: 10.2 MG/DL (ref 8.8–10.2)
CHLORIDE SERPL-SCNC: 104 MMOL/L (ref 98–107)
CHOLEST SERPL-MCNC: 165 MG/DL
CREAT SERPL-MCNC: 1.41 MG/DL (ref 0.51–0.95)
DEPRECATED HCO3 PLAS-SCNC: 27 MMOL/L (ref 22–29)
EOSINOPHIL # BLD AUTO: 0.2 10E3/UL (ref 0–0.7)
EOSINOPHIL NFR BLD AUTO: 3 %
ERYTHROCYTE [DISTWIDTH] IN BLOOD BY AUTOMATED COUNT: 14.4 % (ref 10–15)
GFR SERPL CREATININE-BSD FRML MDRD: 37 ML/MIN/1.73M2
GLUCOSE SERPL-MCNC: 175 MG/DL (ref 70–99)
HBA1C MFR BLD: 8.2 % (ref 0–5.6)
HCT VFR BLD AUTO: 35.3 % (ref 35–47)
HDLC SERPL-MCNC: 75 MG/DL
HGB BLD-MCNC: 11.4 G/DL (ref 11.7–15.7)
IMM GRANULOCYTES # BLD: 0 10E3/UL
IMM GRANULOCYTES NFR BLD: 0 %
IRON BINDING CAPACITY (ROCHE): 312 UG/DL (ref 240–430)
IRON SATN MFR SERPL: 17 % (ref 15–46)
IRON SERPL-MCNC: 52 UG/DL (ref 37–145)
LDLC SERPL CALC-MCNC: 75 MG/DL
LYMPHOCYTES # BLD AUTO: 1.5 10E3/UL (ref 0.8–5.3)
LYMPHOCYTES NFR BLD AUTO: 30 %
MCH RBC QN AUTO: 30.2 PG (ref 26.5–33)
MCHC RBC AUTO-ENTMCNC: 32.3 G/DL (ref 31.5–36.5)
MCV RBC AUTO: 94 FL (ref 78–100)
MONOCYTES # BLD AUTO: 0.5 10E3/UL (ref 0–1.3)
MONOCYTES NFR BLD AUTO: 10 %
NEUTROPHILS # BLD AUTO: 2.7 10E3/UL (ref 1.6–8.3)
NEUTROPHILS NFR BLD AUTO: 55 %
NONHDLC SERPL-MCNC: 90 MG/DL
PLATELET # BLD AUTO: 231 10E3/UL (ref 150–450)
POTASSIUM SERPL-SCNC: 4.4 MMOL/L (ref 3.4–5.3)
PROT SERPL-MCNC: 6.8 G/DL (ref 6.4–8.3)
RBC # BLD AUTO: 3.77 10E6/UL (ref 3.8–5.2)
SODIUM SERPL-SCNC: 142 MMOL/L (ref 136–145)
TRIGL SERPL-MCNC: 76 MG/DL
VIT B12 SERPL-MCNC: 487 PG/ML (ref 232–1245)
WBC # BLD AUTO: 4.8 10E3/UL (ref 4–11)

## 2022-08-03 PROCEDURE — 82306 VITAMIN D 25 HYDROXY: CPT | Performed by: INTERNAL MEDICINE

## 2022-08-03 PROCEDURE — 83550 IRON BINDING TEST: CPT | Performed by: INTERNAL MEDICINE

## 2022-08-03 PROCEDURE — 82607 VITAMIN B-12: CPT | Performed by: INTERNAL MEDICINE

## 2022-08-03 PROCEDURE — 36415 COLL VENOUS BLD VENIPUNCTURE: CPT | Performed by: INTERNAL MEDICINE

## 2022-08-03 PROCEDURE — 83540 ASSAY OF IRON: CPT | Performed by: INTERNAL MEDICINE

## 2022-08-03 PROCEDURE — 99214 OFFICE O/P EST MOD 30 MIN: CPT | Performed by: INTERNAL MEDICINE

## 2022-08-03 PROCEDURE — 80053 COMPREHEN METABOLIC PANEL: CPT | Performed by: INTERNAL MEDICINE

## 2022-08-03 PROCEDURE — 85025 COMPLETE CBC W/AUTO DIFF WBC: CPT | Performed by: INTERNAL MEDICINE

## 2022-08-03 PROCEDURE — 83036 HEMOGLOBIN GLYCOSYLATED A1C: CPT | Performed by: INTERNAL MEDICINE

## 2022-08-03 PROCEDURE — 80061 LIPID PANEL: CPT | Performed by: INTERNAL MEDICINE

## 2022-08-03 NOTE — LETTER
August 4, 2022      Berta Witt  [address] [address]        Dear ,    We are writing to inform you of your test results.    Birdie,  Vitamin D level is good, continue current supplement.  Kidney function is stable, potassium level is good.  Cholesterol level is perfect.  Anemia is mild, and B12 levels are normal.  A1c is up from 7.2 to 8.2.  We could add a small dose of Jardiance oral pill to your metformin.  Let me know if you would like me to order it for you.  If you start on that medication, we will need to repeat your kidney function again in a month.    Dr Lucero      Resulted Orders   HEMOGLOBIN A1C   Result Value Ref Range    Hemoglobin A1C 8.2 (H) 0.0 - 5.6 %      Comment:      Normal <5.7%   Prediabetes 5.7-6.4%    Diabetes 6.5% or higher     Note: Adopted from ADA consensus guidelines.   Lipid panel reflex to direct LDL Non-fasting   Result Value Ref Range    Cholesterol 165 <200 mg/dL    Triglycerides 76 <150 mg/dL    Direct Measure HDL 75 >=50 mg/dL    LDL Cholesterol Calculated 75 <=100 mg/dL    Non HDL Cholesterol 90 <130 mg/dL    Narrative    Cholesterol  Desirable:  <200 mg/dL    Triglycerides  Normal:  Less than 150 mg/dL  Borderline High:  150-199 mg/dL  High:  200-499 mg/dL  Very High:  Greater than or equal to 500 mg/dL    Direct Measure HDL  Female:  Greater than or equal to 50 mg/dL   Male:  Greater than or equal to 40 mg/dL    LDL Cholesterol  Desirable:  <100mg/dL  Above Desirable:  100-129 mg/dL   Borderline High:  130-159 mg/dL   High:  160-189 mg/dL   Very High:  >= 190 mg/dL    Non HDL Cholesterol  Desirable:  130 mg/dL  Above Desirable:  130-159 mg/dL  Borderline High:  160-189 mg/dL  High:  190-219 mg/dL  Very High:  Greater than or equal to 220 mg/dL   Comprehensive metabolic panel   Result Value Ref Range    Sodium 142 136 - 145 mmol/L    Potassium 4.4 3.4 - 5.3 mmol/L    Creatinine 1.41 (H) 0.51 - 0.95 mg/dL    Urea Nitrogen 21.0 8.0 -  23.0 mg/dL    Chloride 104 98 - 107 mmol/L    Carbon Dioxide (CO2) 27 22 - 29 mmol/L    Anion Gap 11 7 - 15 mmol/L    Glucose 175 (H) 70 - 99 mg/dL    Calcium 10.2 8.8 - 10.2 mg/dL    Protein Total 6.8 6.4 - 8.3 g/dL    Albumin 4.4 3.5 - 5.2 g/dL    Bilirubin Total 0.5 <=1.2 mg/dL    Alkaline Phosphatase 65 35 - 104 U/L    AST 24 10 - 35 U/L    ALT 14 10 - 35 U/L    GFR Estimate 37 (L) >60 mL/min/1.73m2      Comment:      Effective December 21, 2021 eGFRcr in adults is calculated using the 2021 CKD-EPI creatinine equation which includes age and gender (Liliana et al., NEJM, DOI: 10.1056/XQNOyx4132520)   Vitamin D Deficiency   Result Value Ref Range    Vitamin D, Total (25-Hydroxy) 48 20 - 75 ug/L    Narrative    Season, race, dietary intake, and treatment affect the concentration of 25-hydroxy-Vitamin D. Values may decrease during winter months and increase during summer months. Values 20-29 ug/L may indicate Vitamin D insufficiency and values <20 ug/L may indicate Vitamin D deficiency.    Vitamin D determination is routinely performed by an immunoassay specific for 25 hydroxyvitamin D3.  If an individual is on vitamin D2(ergocalciferol) supplementation, please specify 25 OH vitamin D2 and D3 level determination by LCMSMS test VITD23.     Iron and iron binding capacity   Result Value Ref Range    Iron 52 37 - 145 ug/dL    Iron Sat Index 17 15 - 46 %    Iron Binding Capacity 312 240 - 430 ug/dL   Vitamin B12   Result Value Ref Range    Vitamin B12 487 232 - 1,245 pg/mL   CBC with platelets and differential   Result Value Ref Range    WBC Count 4.8 4.0 - 11.0 10e3/uL    RBC Count 3.77 (L) 3.80 - 5.20 10e6/uL    Hemoglobin 11.4 (L) 11.7 - 15.7 g/dL    Hematocrit 35.3 35.0 - 47.0 %    MCV 94 78 - 100 fL    MCH 30.2 26.5 - 33.0 pg    MCHC 32.3 31.5 - 36.5 g/dL    RDW 14.4 10.0 - 15.0 %    Platelet Count 231 150 - 450 10e3/uL    % Neutrophils 55 %    % Lymphocytes 30 %    % Monocytes 10 %    % Eosinophils 3 %    % Basophils  0 %    % Immature Granulocytes 0 %    Absolute Neutrophils 2.7 1.6 - 8.3 10e3/uL    Absolute Lymphocytes 1.5 0.8 - 5.3 10e3/uL    Absolute Monocytes 0.5 0.0 - 1.3 10e3/uL    Absolute Eosinophils 0.2 0.0 - 0.7 10e3/uL    Absolute Basophils 0.0 0.0 - 0.2 10e3/uL    Absolute Immature Granulocytes 0.0 <=0.4 10e3/uL       If you have any questions or concerns, please call the clinic at the number listed above.       Sincerely,      Dianna Lucero MD

## 2022-08-03 NOTE — PROGRESS NOTES
"  Assessment & Plan     Controlled type 2 diabetes mellitus with stage 3 chronic kidney disease, without long-term current use of insulin (H)  A1c in January was 7.2.  Overall for her age goal A1c would be 7.5 and less however she also has chronic renal insufficiency.  Discussed possibility of adding Jardiance or Farxiga to her metformin depending on results of the A1c.  - HEMOGLOBIN A1C    Screening for hyperlipidemia  She is on Zocor  - Lipid panel reflex to direct LDL Non-fasting    Stage 3b chronic kidney disease (H)  -At baseline is 1.4-1.5  - Comprehensive metabolic panel    Essential hypertension with goal blood pressure less than 140/90  Acceptable.  She is on lisinopril, hydrochlorothiazide and potassium  - Comprehensive metabolic panel    Vitamin D deficiency  - Vitamin D Deficiency    Blood donor.  She is on iron daily.    BMI:   Estimated body mass index is 28.29 kg/m  as calculated from the following:    Height as of this encounter: 1.676 m (5' 6\").    Weight as of this encounter: 79.5 kg (175 lb 4.8 oz).   Return in about 6 months (around 2/3/2023) for Routine preventive.    Dianna Lucero MD  [facility]   Berta is a 81 year old accompanied by her alone, presenting for the following health issues:  Follow Up and Recheck Medication    Berta is an 81-year-old patient with history of chronic renal insufficiency, type 2 diabetes, hypertension, hyperlipidemia, spinal stenosis, regular blood donations who is currently here for her 6 months checkup.    Usually she sees her PCP every 6 months to check blood work.  She denies any problems and concerns today.    She is on chronic iron because she donates blood every 3 months on a regular basis.  She denies any blood loss through her stool urine or vaginally.    She does not check her sugars at home and does not know how.  She is on metformin 1000 mg twice a day.    Blood pressure is good on lisinopril, " "hydrochlorothiazide and potassium supplements.  She denies any dizziness or lightheadedness.    History of Present Illness       Diabetes:   She presents for follow up of diabetes.  She is not checking blood glucose. She has no concerns regarding her diabetes at this time.  She is not experiencing numbness or burning in feet, excessive thirst, blurry vision, weight changes or redness, sores or blisters on feet. The patient has had a diabetic eye exam in the last 12 months. Eye exam performed on August-2021. Location of last eye exam [facility].        Hypertension: She presents for follow up of hypertension.  She does not check blood pressure  regularly outside of the clinic. Outpatient blood pressures have not been over 140/90. She follows a low salt diet.         Pt states she's feeling good. And just having a follow up      Review of Systems   As above      Objective    /79 (BP Location: Left arm, Patient Position: Sitting, Cuff Size: Adult Regular)   Pulse 73   Temp 97.7  F (36.5  C) (Tympanic)   Resp 16   Ht 1.676 m (5' 6\")   Wt 79.5 kg (175 lb 4.8 oz)   SpO2 99%   BMI 28.29 kg/m    There is no height or weight on file to calculate BMI.  Physical Exam       General: well appearing female, alert and oriented x3  EYES: Eyelids, conjunctiva, and sclera were normal. Pupils were normal.   HEAD, EARS, NOSE, MOUTH, AND THROAT: no cervical LAD, no thyromegaly or nodules appreciated. TMs are obstructed by wax, patient denies hearing loss, oropharynx is clear.  RESPIRATORY: respirations non labored, CTA bl, no wheezes, rales, no forced expiratory wheezing.  CARDIOVASCULAR: Heart rate and rhythm were normal. No murmurs, rubs,gallops. There was no peripheral edema.   GASTROINTESTINAL: Positive bowel sounds, abdomen is soft, non tender, non distended.     MUSCULOSKELETAL: Muscle mass was normal for age. No joint synovitis or deformity.  LYMPHATIC: There were no enlarged nodes palpable.  SKIN/HAIR/NAILS: Skin " color was normal.  No rashes.  NEUROLOGIC: The patient was alert and oriented.  Speech was normal.  There is no facial asymmetry.   PSYCHIATRIC:  Mood and affect were normal.   DP pulses, monofilament test today is normal.        .  ..

## 2022-08-04 LAB — DEPRECATED CALCIDIOL+CALCIFEROL SERPL-MC: 48 UG/L (ref 20–75)

## 2022-08-08 ENCOUNTER — TRANSFERRED RECORDS (OUTPATIENT)
Dept: HEALTH INFORMATION MANAGEMENT | Facility: CLINIC | Age: 82
End: 2022-08-08

## 2022-08-12 DIAGNOSIS — I10 ESSENTIAL HYPERTENSION WITH GOAL BLOOD PRESSURE LESS THAN 140/90: ICD-10-CM

## 2022-08-12 NOTE — TELEPHONE ENCOUNTER
"Routing refill request to provider for review/approval because:  Labs out of range:  Creatinine    Last Written Prescription Date:  9/15/21  Last Fill Quantity: 14,  # refills: 1   Last office visit provider:  8/3/22     Requested Prescriptions   Pending Prescriptions Disp Refills     hydrochlorothiazide (HYDRODIURIL) 25 MG tablet [Pharmacy Med Name: HYDROCHLOROTHIAZIDE TABS 25MG] 90 tablet 3     Sig: TAKE 1 TABLET DAILY       Diuretics (Including Combos) Protocol Failed - 8/12/2022  2:49 PM        Failed - Normal serum creatinine on file in past 12 months     Recent Labs   Lab Test 08/03/22  0933   CR 1.41*              Passed - Blood pressure under 140/90 in past 12 months     BP Readings from Last 3 Encounters:   08/03/22 132/79   08/06/21 138/80   07/15/20 130/68                 Passed - Recent (12 mo) or future (30 days) visit within the authorizing provider's specialty     Patient has had an office visit with the authorizing provider or a provider within the authorizing providers department within the previous 12 mos or has a future within next 30 days. See \"Patient Info\" tab in inbasket, or \"Choose Columns\" in Meds & Orders section of the refill encounter.              Passed - Medication is active on med list        Passed - Patient is age 18 or older        Passed - No active pregancy on record        Passed - Normal serum potassium on file in past 12 months     Recent Labs   Lab Test 08/03/22  0933   POTASSIUM 4.4                    Passed - Normal serum sodium on file in past 12 months     Recent Labs   Lab Test 08/03/22  0933                 Passed - No positive pregnancy test in past 12 months             Arsalan Johnson RN 08/12/22 2:49 PM  "

## 2022-08-16 RX ORDER — HYDROCHLOROTHIAZIDE 25 MG/1
25 TABLET ORAL DAILY
Qty: 90 TABLET | Refills: 3 | Status: SHIPPED | OUTPATIENT
Start: 2022-08-16 | End: 2023-08-07

## 2022-08-22 ENCOUNTER — TRANSFERRED RECORDS (OUTPATIENT)
Dept: HEALTH INFORMATION MANAGEMENT | Facility: CLINIC | Age: 82
End: 2022-08-22

## 2022-08-22 LAB — RETINOPATHY: NEGATIVE

## 2022-08-25 ENCOUNTER — TRANSFERRED RECORDS (OUTPATIENT)
Dept: HEALTH INFORMATION MANAGEMENT | Facility: CLINIC | Age: 82
End: 2022-08-25

## 2022-09-12 DIAGNOSIS — I10 ESSENTIAL HYPERTENSION WITH GOAL BLOOD PRESSURE LESS THAN 140/90: ICD-10-CM

## 2022-09-12 RX ORDER — LISINOPRIL 20 MG/1
20 TABLET ORAL AT BEDTIME
Qty: 90 TABLET | Refills: 3 | Status: SHIPPED | OUTPATIENT
Start: 2022-09-12 | End: 2023-09-07

## 2022-09-12 NOTE — TELEPHONE ENCOUNTER
"Routing refill request to provider for review/approval because:  Labs out of range:  Creatinine    Last Written Prescription Date:  10/5/21  Last Fill Quantity: 90,  # refills: 3   Last office visit provider:  8/3/22     Requested Prescriptions   Pending Prescriptions Disp Refills     lisinopril (ZESTRIL) 20 MG tablet [Pharmacy Med Name: LISINOPRIL TABS 20MG] 90 tablet 3     Sig: TAKE 1 TABLET AT BEDTIME       ACE Inhibitors (Including Combos) Protocol Failed - 9/12/2022 11:16 AM        Failed - Normal serum creatinine on file in past 12 months     Recent Labs   Lab Test 08/03/22  0933   CR 1.41*       Ok to refill medication if creatinine is low          Passed - Blood pressure under 140/90 in past 12 months     BP Readings from Last 3 Encounters:   08/03/22 132/79   08/06/21 138/80   07/15/20 130/68                 Passed - Recent (12 mo) or future (30 days) visit within the authorizing provider's specialty     Patient has had an office visit with the authorizing provider or a provider within the authorizing providers department within the previous 12 mos or has a future within next 30 days. See \"Patient Info\" tab in inbasket, or \"Choose Columns\" in Meds & Orders section of the refill encounter.              Passed - Medication is active on med list        Passed - Patient is age 18 or older        Passed - No active pregnancy on record        Passed - Normal serum potassium on file in past 12 months     Recent Labs   Lab Test 08/03/22  0933   POTASSIUM 4.4             Passed - No positive pregnancy test within past 12 months             Arsalan Johnson RN 09/12/22 11:16 AM  "

## 2022-09-15 ENCOUNTER — TRANSFERRED RECORDS (OUTPATIENT)
Dept: HEALTH INFORMATION MANAGEMENT | Facility: CLINIC | Age: 82
End: 2022-09-15

## 2022-09-25 ENCOUNTER — HEALTH MAINTENANCE LETTER (OUTPATIENT)
Age: 82
End: 2022-09-25

## 2023-02-06 PROCEDURE — 82274 ASSAY TEST FOR BLOOD FECAL: CPT | Performed by: INTERNAL MEDICINE

## 2023-02-08 ENCOUNTER — OFFICE VISIT (OUTPATIENT)
Dept: INTERNAL MEDICINE | Facility: CLINIC | Age: 83
End: 2023-02-08
Payer: COMMERCIAL

## 2023-02-08 VITALS
HEART RATE: 81 BPM | HEIGHT: 67 IN | BODY MASS INDEX: 27 KG/M2 | RESPIRATION RATE: 21 BRPM | TEMPERATURE: 97.9 F | WEIGHT: 172 LBS | DIASTOLIC BLOOD PRESSURE: 68 MMHG | SYSTOLIC BLOOD PRESSURE: 118 MMHG | OXYGEN SATURATION: 100 %

## 2023-02-08 DIAGNOSIS — N18.30 CONTROLLED TYPE 2 DIABETES MELLITUS WITH STAGE 3 CHRONIC KIDNEY DISEASE, WITHOUT LONG-TERM CURRENT USE OF INSULIN (H): Primary | ICD-10-CM

## 2023-02-08 DIAGNOSIS — E55.9 VITAMIN D DEFICIENCY: ICD-10-CM

## 2023-02-08 DIAGNOSIS — Z12.31 ENCOUNTER FOR SCREENING MAMMOGRAM FOR BREAST CANCER: ICD-10-CM

## 2023-02-08 DIAGNOSIS — R01.1 HEART MURMUR: ICD-10-CM

## 2023-02-08 DIAGNOSIS — D64.9 ANEMIA, UNSPECIFIED TYPE: ICD-10-CM

## 2023-02-08 DIAGNOSIS — E11.22 CONTROLLED TYPE 2 DIABETES MELLITUS WITH STAGE 3 CHRONIC KIDNEY DISEASE, WITHOUT LONG-TERM CURRENT USE OF INSULIN (H): Primary | ICD-10-CM

## 2023-02-08 LAB
BASOPHILS # BLD AUTO: 0 10E3/UL (ref 0–0.2)
BASOPHILS NFR BLD AUTO: 0 %
CREAT UR-MCNC: 60.8 MG/DL
EOSINOPHIL # BLD AUTO: 0.2 10E3/UL (ref 0–0.7)
EOSINOPHIL NFR BLD AUTO: 3 %
ERYTHROCYTE [DISTWIDTH] IN BLOOD BY AUTOMATED COUNT: 12.9 % (ref 10–15)
HBA1C MFR BLD: 7.9 % (ref 0–5.6)
HCT VFR BLD AUTO: 37.8 % (ref 35–47)
HGB BLD-MCNC: 12.6 G/DL (ref 11.7–15.7)
IMM GRANULOCYTES # BLD: 0 10E3/UL
IMM GRANULOCYTES NFR BLD: 0 %
LYMPHOCYTES # BLD AUTO: 1.5 10E3/UL (ref 0.8–5.3)
LYMPHOCYTES NFR BLD AUTO: 29 %
MCH RBC QN AUTO: 30.7 PG (ref 26.5–33)
MCHC RBC AUTO-ENTMCNC: 33.3 G/DL (ref 31.5–36.5)
MCV RBC AUTO: 92 FL (ref 78–100)
MICROALBUMIN UR-MCNC: <12 MG/L
MICROALBUMIN/CREAT UR: NORMAL MG/G{CREAT}
MONOCYTES # BLD AUTO: 0.5 10E3/UL (ref 0–1.3)
MONOCYTES NFR BLD AUTO: 9 %
NEUTROPHILS # BLD AUTO: 2.9 10E3/UL (ref 1.6–8.3)
NEUTROPHILS NFR BLD AUTO: 57 %
PLATELET # BLD AUTO: 225 10E3/UL (ref 150–450)
RBC # BLD AUTO: 4.1 10E6/UL (ref 3.8–5.2)
WBC # BLD AUTO: 5 10E3/UL (ref 4–11)

## 2023-02-08 PROCEDURE — 82043 UR ALBUMIN QUANTITATIVE: CPT | Performed by: INTERNAL MEDICINE

## 2023-02-08 PROCEDURE — 99214 OFFICE O/P EST MOD 30 MIN: CPT | Performed by: INTERNAL MEDICINE

## 2023-02-08 PROCEDURE — 83540 ASSAY OF IRON: CPT | Performed by: INTERNAL MEDICINE

## 2023-02-08 PROCEDURE — 83550 IRON BINDING TEST: CPT | Performed by: INTERNAL MEDICINE

## 2023-02-08 PROCEDURE — 84443 ASSAY THYROID STIM HORMONE: CPT | Performed by: INTERNAL MEDICINE

## 2023-02-08 PROCEDURE — 82607 VITAMIN B-12: CPT | Performed by: INTERNAL MEDICINE

## 2023-02-08 PROCEDURE — 83036 HEMOGLOBIN GLYCOSYLATED A1C: CPT | Performed by: INTERNAL MEDICINE

## 2023-02-08 PROCEDURE — 85025 COMPLETE CBC W/AUTO DIFF WBC: CPT | Performed by: INTERNAL MEDICINE

## 2023-02-08 PROCEDURE — 82306 VITAMIN D 25 HYDROXY: CPT | Performed by: INTERNAL MEDICINE

## 2023-02-08 PROCEDURE — 80048 BASIC METABOLIC PNL TOTAL CA: CPT | Performed by: INTERNAL MEDICINE

## 2023-02-08 PROCEDURE — 36415 COLL VENOUS BLD VENIPUNCTURE: CPT | Performed by: INTERNAL MEDICINE

## 2023-02-08 PROCEDURE — 82570 ASSAY OF URINE CREATININE: CPT | Performed by: INTERNAL MEDICINE

## 2023-02-08 NOTE — PROGRESS NOTES
"  Assessment & Plan     Controlled type 2 diabetes mellitus with stage 3 chronic kidney disease, without long-term current use of insulin (H)  Last A1c was 8.3, she is currently on metformin 1000 mg twice a day.  Discussed option of adding additional agent to her metformin.  Since she has renal insufficiency we can try Jardiance.  Did discuss possibility of slightly higher risk of yeast infections and UTIs, patient usually is not prone to those.  If she is unable to tolerate Jardiance, then we can try Januvia.  We will check her labs first.  - Albumin Random Urine Quantitative with Creat Ratio  - HEMOGLOBIN A1C  - Basic metabolic panel  (Ca, Cl, CO2, Creat, Gluc, K, Na, BUN)  - TSH with free T4 reflex    Anemia, unspecified type  This is mild and stable.  Patient does donate blood every 2 months.  Most recently has not donated since November 2 due to weather.  She denies any blood in the stool or urine.  She is not on iron supplements.  - CBC with platelets and differential  - Iron and iron binding capacity  - Vitamin B12  - Fecal colorectal cancer screen FIT; Future  - Fecal colorectal cancer screen FIT    Vitamin D deficiency  - Vitamin D Deficiency    Heart murmur  Mild to moderate right-sided heart murmur noted on exam today, will have an ultrasound done.  - Echocardiogram Complete; Future    Encounter for screening mammogram for breast cancer  Patient would like to continue his mammograms.  - MA Screen Bilateral w/Juan; Future     BMI:   Estimated body mass index is 27.35 kg/m  as calculated from the following:    Height as of this encounter: 1.689 m (5' 6.5\").    Weight as of this encounter: 78 kg (172 lb).     Return in about 4 months (around 6/8/2023) for Routine preventive.    Dianna Lucero MD  [facility]    Subjective   Berta is a 82 year old accompanied by her ALONE, presenting for the following health issues:  Recheck Medication and RECHECK    Berta is an 82-year-old " "patient with history of chronic renal insufficiency, type 2 diabetes, hypertension, hyperlipidemia, spinal stenosis, regular blood donations who is currently here for her 6 months checkup.    Since I saw her, Berta friend passed away suddenly from a massive stroke in January of this year. Berta was quite choking by that and currently said that she was not able to be there for her.  Before she passed.  But she denies depression or anxiety.    We last checked her A1c in August and it was 8.2.  I did send a message through [employer] about eating additional pill to her metformin but she has not received it.  Currently she does not check her sugars at home and takes metformin 1000 mg twice a day.    She does have mild renal insufficiency which has been stable.  Discussed possibility of adding Jardiance to her regimen and if its not tolerated then we can switch to Januvia.    She also noted to be slightly anemic on last blood draw.  She does admits that she donates blood every 2 months but most recently has not donated since November.  She denies any blood in the stool or urine.  Currently she is not on any iron supplement.      Review of Systems   She denies any chest pains palpitations or shortness of breath      Objective    /68 (BP Location: Left arm, Patient Position: Sitting, Cuff Size: Adult Large)   Pulse 81   Temp 97.9  F (36.6  C) (Tympanic)   Resp 21   Ht 1.689 m (5' 6.5\")   Wt 78 kg (172 lb)   SpO2 100%   BMI 27.35 kg/m    There is no height or weight on file to calculate BMI.  Physical Exam   General: well appearing female, alert and oriented x3  EYES: Eyelids, conjunctiva, and sclera were normal. Pupils were normal.   HEAD, EARS, NOSE, MOUTH, AND THROAT: no cervical LAD, no thyromegaly or nodules appreciated. TMs are visualized and normal, oropharynx is clear.  RESPIRATORY: respirations non labored, CTA bl, no wheezes, rales, no forced expiratory wheezing.  CARDIOVASCULAR: Heart rate and rhythm " were normal.  Moderate right-sided systolic ejection murmur noted. There was no peripheral edema.   GASTROINTESTINAL: Positive bowel sounds, abdomen is soft, non tender, non distended.     MUSCULOSKELETAL: Muscle mass was normal for age. No joint synovitis or deformity.  LYMPHATIC: There were no enlarged nodes palpable.  SKIN/HAIR/NAILS: Skin color was normal.  No rashes.  NEUROLOGIC: The patient was alert and oriented.  Speech was normal.  There is no facial asymmetry.   PSYCHIATRIC:  Mood and affect were normal.   Monofilament test is normal.

## 2023-02-08 NOTE — PATIENT INSTRUCTIONS
Will check labs today. Id A1C is still >7.5 , will add Jardiance pill to metformin (we will call you).     2. Will check red cell count . Have stool test done for blood.     3. Make sure you had latest bivalent Covid vaccine ( came out in september 2022)    4. Have mammogram done.    5. Have heart US done to check valves (due to murmur).

## 2023-02-09 LAB
ANION GAP SERPL CALCULATED.3IONS-SCNC: 17 MMOL/L (ref 7–15)
BUN SERPL-MCNC: 29 MG/DL (ref 8–23)
CALCIUM SERPL-MCNC: 10.1 MG/DL (ref 8.8–10.2)
CHLORIDE SERPL-SCNC: 104 MMOL/L (ref 98–107)
CREAT SERPL-MCNC: 1.41 MG/DL (ref 0.51–0.95)
DEPRECATED CALCIDIOL+CALCIFEROL SERPL-MC: 53 UG/L (ref 20–75)
DEPRECATED HCO3 PLAS-SCNC: 22 MMOL/L (ref 22–29)
GFR SERPL CREATININE-BSD FRML MDRD: 37 ML/MIN/1.73M2
GLUCOSE SERPL-MCNC: 138 MG/DL (ref 70–99)
IRON BINDING CAPACITY (ROCHE): 311 UG/DL (ref 240–430)
IRON SATN MFR SERPL: 18 % (ref 15–46)
IRON SERPL-MCNC: 57 UG/DL (ref 37–145)
POTASSIUM SERPL-SCNC: 3.9 MMOL/L (ref 3.4–5.3)
SODIUM SERPL-SCNC: 143 MMOL/L (ref 136–145)
TSH SERPL DL<=0.005 MIU/L-ACNC: 0.83 UIU/ML (ref 0.3–4.2)
VIT B12 SERPL-MCNC: 358 PG/ML (ref 232–1245)

## 2023-02-10 LAB — HEMOCCULT STL QL IA: NEGATIVE

## 2023-02-14 DIAGNOSIS — N18.30 CONTROLLED TYPE 2 DIABETES MELLITUS WITH STAGE 3 CHRONIC KIDNEY DISEASE, WITHOUT LONG-TERM CURRENT USE OF INSULIN (H): Primary | ICD-10-CM

## 2023-02-14 DIAGNOSIS — E11.22 CONTROLLED TYPE 2 DIABETES MELLITUS WITH STAGE 3 CHRONIC KIDNEY DISEASE, WITHOUT LONG-TERM CURRENT USE OF INSULIN (H): Primary | ICD-10-CM

## 2023-02-15 NOTE — TELEPHONE ENCOUNTER
Please let pt know lb results:    Vitamin D level is normal, continue current supplement.  Kidney function is stable.  B12, iron levels and thyroid level are good.  Red cell count is normal.  Stool test is negative for blood.    Sugar/A1C is slightly better from last year and is at 7.9, but can be better.    I would recommend adding a small dose of Jardiance pill (10 mg) to your metformin .  It does have heart and kidney benefits long term. We do recheck kidney function 5 weeks after starting on it.    (if pt agreeable, please pend Jardiance and help her set up lab appt 5 weeks from now).    DR PARKINSON

## 2023-02-15 NOTE — TELEPHONE ENCOUNTER
Attempted to call pt, no answer (1/3). Left message requesting pt to call back clinic. See recent PCP note for this encounter.

## 2023-02-15 NOTE — TELEPHONE ENCOUNTER
Patient returned call, TC did not speak to patient regarding Jardiance.  Attempted to call pt, no answer (1/3). Left message requesting pt to call back clinic. See recent PCP note for this encounter.

## 2023-02-16 ENCOUNTER — ANCILLARY PROCEDURE (OUTPATIENT)
Dept: MAMMOGRAPHY | Facility: CLINIC | Age: 83
End: 2023-02-16
Attending: INTERNAL MEDICINE
Payer: COMMERCIAL

## 2023-02-16 DIAGNOSIS — Z12.31 ENCOUNTER FOR SCREENING MAMMOGRAM FOR BREAST CANCER: ICD-10-CM

## 2023-02-16 PROCEDURE — 77063 BREAST TOMOSYNTHESIS BI: CPT | Mod: TC | Performed by: RADIOLOGY

## 2023-02-16 PROCEDURE — 77067 SCR MAMMO BI INCL CAD: CPT | Mod: TC | Performed by: RADIOLOGY

## 2023-02-20 ENCOUNTER — TRANSFERRED RECORDS (OUTPATIENT)
Dept: HEALTH INFORMATION MANAGEMENT | Facility: CLINIC | Age: 83
End: 2023-02-20

## 2023-02-20 DIAGNOSIS — E11.22 CONTROLLED TYPE 2 DIABETES MELLITUS WITH STAGE 3 CHRONIC KIDNEY DISEASE, WITHOUT LONG-TERM CURRENT USE OF INSULIN (H): ICD-10-CM

## 2023-02-20 DIAGNOSIS — N18.30 CONTROLLED TYPE 2 DIABETES MELLITUS WITH STAGE 3 CHRONIC KIDNEY DISEASE, WITHOUT LONG-TERM CURRENT USE OF INSULIN (H): ICD-10-CM

## 2023-02-20 LAB — RETINOPATHY: NEGATIVE

## 2023-02-20 RX ORDER — SIMVASTATIN 10 MG
TABLET ORAL
Qty: 90 TABLET | Refills: 2 | Status: SHIPPED | OUTPATIENT
Start: 2023-02-20 | End: 2023-11-16

## 2023-02-20 NOTE — TELEPHONE ENCOUNTER
"Last Written Prescription Date:  12/17/21  Last Fill Quantity: 90,  # refills: 3   Last office visit provider:  2/8/23     Requested Prescriptions   Pending Prescriptions Disp Refills     simvastatin (ZOCOR) 10 MG tablet [Pharmacy Med Name: SIMVASTATIN TABS 10MG] 90 tablet 3     Sig: TAKE 1 TABLET AT BEDTIME       Statins Protocol Passed - 2/20/2023 12:19 AM        Passed - LDL on file in past 12 months     Recent Labs   Lab Test 08/03/22  0933   LDL 75             Passed - No abnormal creatine kinase in past 12 months     No lab results found.             Passed - Recent (12 mo) or future (30 days) visit within the authorizing provider's specialty     Patient has had an office visit with the authorizing provider or a provider within the authorizing providers department within the previous 12 mos or has a future within next 30 days. See \"Patient Info\" tab in inbasket, or \"Choose Columns\" in Meds & Orders section of the refill encounter.              Passed - Medication is active on med list        Passed - Patient is age 18 or older        Passed - No active pregnancy on record        Passed - No positive pregnancy test in past 12 months             Lizeth Teresa RN 02/20/23 1:01 PM  "

## 2023-03-03 ENCOUNTER — LAB (OUTPATIENT)
Dept: LAB | Facility: CLINIC | Age: 83
End: 2023-03-03
Payer: COMMERCIAL

## 2023-03-03 DIAGNOSIS — Z20.822 ENCOUNTER FOR LABORATORY TESTING FOR COVID-19 VIRUS: ICD-10-CM

## 2023-03-03 PROCEDURE — U0003 INFECTIOUS AGENT DETECTION BY NUCLEIC ACID (DNA OR RNA); SEVERE ACUTE RESPIRATORY SYNDROME CORONAVIRUS 2 (SARS-COV-2) (CORONAVIRUS DISEASE [COVID-19]), AMPLIFIED PROBE TECHNIQUE, MAKING USE OF HIGH THROUGHPUT TECHNOLOGIES AS DESCRIBED BY CMS-2020-01-R: HCPCS

## 2023-03-03 PROCEDURE — U0005 INFEC AGEN DETEC AMPLI PROBE: HCPCS

## 2023-03-04 LAB — SARS-COV-2 RNA RESP QL NAA+PROBE: NEGATIVE

## 2023-03-10 ENCOUNTER — DOCUMENTATION ONLY (OUTPATIENT)
Dept: INTERNAL MEDICINE | Facility: CLINIC | Age: 83
End: 2023-03-10
Payer: COMMERCIAL

## 2023-03-10 DIAGNOSIS — N18.30 CONTROLLED TYPE 2 DIABETES MELLITUS WITH STAGE 3 CHRONIC KIDNEY DISEASE, WITHOUT LONG-TERM CURRENT USE OF INSULIN (H): Primary | ICD-10-CM

## 2023-03-10 DIAGNOSIS — E11.22 CONTROLLED TYPE 2 DIABETES MELLITUS WITH STAGE 3 CHRONIC KIDNEY DISEASE, WITHOUT LONG-TERM CURRENT USE OF INSULIN (H): Primary | ICD-10-CM

## 2023-03-10 NOTE — PROGRESS NOTES
Patient has upcoming lab appointment.  No orders in chart.  Please advise/enter orders.  Thank you.

## 2023-03-14 ENCOUNTER — HOSPITAL ENCOUNTER (OUTPATIENT)
Dept: CARDIOLOGY | Facility: HOSPITAL | Age: 83
Discharge: HOME | End: 2023-03-14
Attending: INTERNAL MEDICINE | Admitting: INTERNAL MEDICINE
Payer: COMMERCIAL

## 2023-03-14 DIAGNOSIS — R01.1 HEART MURMUR: ICD-10-CM

## 2023-03-14 LAB — LVEF ECHO: NORMAL

## 2023-03-14 PROCEDURE — 93306 TTE W/DOPPLER COMPLETE: CPT | Mod: 26 | Performed by: INTERNAL MEDICINE

## 2023-03-14 PROCEDURE — 93306 TTE W/DOPPLER COMPLETE: CPT

## 2023-03-31 ENCOUNTER — APPOINTMENT (OUTPATIENT)
Dept: LAB | Facility: CLINIC | Age: 83
End: 2023-03-31
Payer: COMMERCIAL

## 2023-03-31 ENCOUNTER — LAB (OUTPATIENT)
Dept: LAB | Facility: CLINIC | Age: 83
End: 2023-03-31
Payer: COMMERCIAL

## 2023-03-31 DIAGNOSIS — E11.22 CONTROLLED TYPE 2 DIABETES MELLITUS WITH STAGE 3 CHRONIC KIDNEY DISEASE, WITHOUT LONG-TERM CURRENT USE OF INSULIN (H): ICD-10-CM

## 2023-03-31 DIAGNOSIS — Z20.822 ENCOUNTER FOR LABORATORY TESTING FOR COVID-19 VIRUS: ICD-10-CM

## 2023-03-31 DIAGNOSIS — N18.30 CONTROLLED TYPE 2 DIABETES MELLITUS WITH STAGE 3 CHRONIC KIDNEY DISEASE, WITHOUT LONG-TERM CURRENT USE OF INSULIN (H): ICD-10-CM

## 2023-03-31 LAB
ANION GAP SERPL CALCULATED.3IONS-SCNC: 15 MMOL/L (ref 7–15)
BUN SERPL-MCNC: 19.7 MG/DL (ref 8–23)
CALCIUM SERPL-MCNC: 9.8 MG/DL (ref 8.8–10.2)
CHLORIDE SERPL-SCNC: 102 MMOL/L (ref 98–107)
CREAT SERPL-MCNC: 1.49 MG/DL (ref 0.51–0.95)
DEPRECATED HCO3 PLAS-SCNC: 26 MMOL/L (ref 22–29)
GFR SERPL CREATININE-BSD FRML MDRD: 35 ML/MIN/1.73M2
GLUCOSE SERPL-MCNC: 143 MG/DL (ref 70–99)
POTASSIUM SERPL-SCNC: 4 MMOL/L (ref 3.4–5.3)
SODIUM SERPL-SCNC: 143 MMOL/L (ref 136–145)

## 2023-03-31 PROCEDURE — U0005 INFEC AGEN DETEC AMPLI PROBE: HCPCS

## 2023-03-31 PROCEDURE — 80048 BASIC METABOLIC PNL TOTAL CA: CPT

## 2023-03-31 PROCEDURE — 36415 COLL VENOUS BLD VENIPUNCTURE: CPT

## 2023-03-31 PROCEDURE — U0003 INFECTIOUS AGENT DETECTION BY NUCLEIC ACID (DNA OR RNA); SEVERE ACUTE RESPIRATORY SYNDROME CORONAVIRUS 2 (SARS-COV-2) (CORONAVIRUS DISEASE [COVID-19]), AMPLIFIED PROBE TECHNIQUE, MAKING USE OF HIGH THROUGHPUT TECHNOLOGIES AS DESCRIBED BY CMS-2020-01-R: HCPCS

## 2023-04-01 LAB — SARS-COV-2 RNA RESP QL NAA+PROBE: NEGATIVE

## 2023-06-08 DIAGNOSIS — N18.30 CONTROLLED TYPE 2 DIABETES MELLITUS WITH STAGE 3 CHRONIC KIDNEY DISEASE, WITHOUT LONG-TERM CURRENT USE OF INSULIN (H): ICD-10-CM

## 2023-06-08 DIAGNOSIS — E11.22 CONTROLLED TYPE 2 DIABETES MELLITUS WITH STAGE 3 CHRONIC KIDNEY DISEASE, WITHOUT LONG-TERM CURRENT USE OF INSULIN (H): ICD-10-CM

## 2023-06-08 DIAGNOSIS — E87.6 HYPOKALEMIA: ICD-10-CM

## 2023-06-09 ENCOUNTER — OFFICE VISIT (OUTPATIENT)
Dept: INTERNAL MEDICINE | Facility: CLINIC | Age: 83
End: 2023-06-09
Payer: COMMERCIAL

## 2023-06-09 VITALS
BODY MASS INDEX: 26.84 KG/M2 | OXYGEN SATURATION: 100 % | SYSTOLIC BLOOD PRESSURE: 153 MMHG | WEIGHT: 167 LBS | HEIGHT: 66 IN | HEART RATE: 75 BPM | TEMPERATURE: 98.5 F | RESPIRATION RATE: 15 BRPM | DIASTOLIC BLOOD PRESSURE: 82 MMHG

## 2023-06-09 DIAGNOSIS — E11.22 CONTROLLED TYPE 2 DIABETES MELLITUS WITH STAGE 3 CHRONIC KIDNEY DISEASE, WITHOUT LONG-TERM CURRENT USE OF INSULIN (H): ICD-10-CM

## 2023-06-09 DIAGNOSIS — E78.5 HYPERLIPIDEMIA LDL GOAL <100: ICD-10-CM

## 2023-06-09 DIAGNOSIS — Z00.00 ENCOUNTER FOR MEDICARE ANNUAL WELLNESS EXAM: Primary | ICD-10-CM

## 2023-06-09 DIAGNOSIS — H61.23 BILATERAL IMPACTED CERUMEN: ICD-10-CM

## 2023-06-09 DIAGNOSIS — N18.30 CONTROLLED TYPE 2 DIABETES MELLITUS WITH STAGE 3 CHRONIC KIDNEY DISEASE, WITHOUT LONG-TERM CURRENT USE OF INSULIN (H): ICD-10-CM

## 2023-06-09 DIAGNOSIS — Z78.0 POST-MENOPAUSAL: ICD-10-CM

## 2023-06-09 DIAGNOSIS — I10 PRIMARY HYPERTENSION: ICD-10-CM

## 2023-06-09 DIAGNOSIS — N18.32 STAGE 3B CHRONIC KIDNEY DISEASE (H): ICD-10-CM

## 2023-06-09 LAB
ALBUMIN SERPL BCG-MCNC: 4.4 G/DL (ref 3.5–5.2)
ALP SERPL-CCNC: 53 U/L (ref 35–104)
ALT SERPL W P-5'-P-CCNC: 12 U/L (ref 10–35)
ANION GAP SERPL CALCULATED.3IONS-SCNC: 12 MMOL/L (ref 7–15)
AST SERPL W P-5'-P-CCNC: 22 U/L (ref 10–35)
BILIRUB DIRECT SERPL-MCNC: <0.2 MG/DL (ref 0–0.3)
BILIRUB SERPL-MCNC: 0.4 MG/DL
BUN SERPL-MCNC: 24.6 MG/DL (ref 8–23)
CALCIUM SERPL-MCNC: 9.6 MG/DL (ref 8.8–10.2)
CHLORIDE SERPL-SCNC: 104 MMOL/L (ref 98–107)
CHOLEST SERPL-MCNC: 152 MG/DL
CREAT SERPL-MCNC: 1.5 MG/DL (ref 0.51–0.95)
DEPRECATED HCO3 PLAS-SCNC: 25 MMOL/L (ref 22–29)
GFR SERPL CREATININE-BSD FRML MDRD: 34 ML/MIN/1.73M2
GLUCOSE SERPL-MCNC: 153 MG/DL (ref 70–99)
HBA1C MFR BLD: 7.2 % (ref 0–5.6)
HDLC SERPL-MCNC: 73 MG/DL
LDLC SERPL CALC-MCNC: 64 MG/DL
NONHDLC SERPL-MCNC: 79 MG/DL
POTASSIUM SERPL-SCNC: 4.1 MMOL/L (ref 3.4–5.3)
PROT SERPL-MCNC: 6.7 G/DL (ref 6.4–8.3)
SODIUM SERPL-SCNC: 141 MMOL/L (ref 136–145)
TRIGL SERPL-MCNC: 76 MG/DL

## 2023-06-09 PROCEDURE — 83036 HEMOGLOBIN GLYCOSYLATED A1C: CPT | Performed by: INTERNAL MEDICINE

## 2023-06-09 PROCEDURE — 99214 OFFICE O/P EST MOD 30 MIN: CPT | Mod: 25 | Performed by: INTERNAL MEDICINE

## 2023-06-09 PROCEDURE — G0439 PPPS, SUBSEQ VISIT: HCPCS | Performed by: INTERNAL MEDICINE

## 2023-06-09 PROCEDURE — 82248 BILIRUBIN DIRECT: CPT | Performed by: INTERNAL MEDICINE

## 2023-06-09 PROCEDURE — 69209 REMOVE IMPACTED EAR WAX UNI: CPT | Performed by: INTERNAL MEDICINE

## 2023-06-09 PROCEDURE — 80053 COMPREHEN METABOLIC PANEL: CPT | Performed by: INTERNAL MEDICINE

## 2023-06-09 PROCEDURE — 36415 COLL VENOUS BLD VENIPUNCTURE: CPT | Performed by: INTERNAL MEDICINE

## 2023-06-09 PROCEDURE — 80061 LIPID PANEL: CPT | Performed by: INTERNAL MEDICINE

## 2023-06-09 RX ORDER — POTASSIUM CHLORIDE 1500 MG/1
TABLET, EXTENDED RELEASE ORAL
Qty: 180 TABLET | Refills: 3 | Status: SHIPPED | OUTPATIENT
Start: 2023-06-09 | End: 2023-09-25

## 2023-06-09 ASSESSMENT — ENCOUNTER SYMPTOMS
ABDOMINAL PAIN: 0
EYE PAIN: 0
HEMATOCHEZIA: 0
ARTHRALGIAS: 0
DIARRHEA: 0
NAUSEA: 0
FREQUENCY: 0
DIZZINESS: 0
COUGH: 0
CONSTIPATION: 0
BREAST MASS: 0
SORE THROAT: 0
HEARTBURN: 0
CHILLS: 0
HEADACHES: 0
NERVOUS/ANXIOUS: 0
PARESTHESIAS: 0
JOINT SWELLING: 0
WEAKNESS: 0
FEVER: 0
PALPITATIONS: 0
MYALGIAS: 0
HEMATURIA: 0
SHORTNESS OF BREATH: 0
DYSURIA: 0

## 2023-06-09 ASSESSMENT — ACTIVITIES OF DAILY LIVING (ADL): CURRENT_FUNCTION: NO ASSISTANCE NEEDED

## 2023-06-09 NOTE — TELEPHONE ENCOUNTER
"Routing refill request to provider for review/approval because:  Labs out of range:  GFR    Last Written Prescription Date:  6/13/2022  Last Fill Quantity: 180,  # refills: 3   Last office visit provider:  6/9/2023     Requested Prescriptions   Pending Prescriptions Disp Refills     metFORMIN (GLUCOPHAGE) 1000 MG tablet [Pharmacy Med Name: METFORMIN HCL TABS 1000MG] 180 tablet 3     Sig: TAKE 1 TABLET TWICE A DAY WITH MEALS       Biguanide Agents Failed - 6/8/2023 11:42 PM        Failed - Patient's CR is NOT>1.4 OR Patient's EGFR is NOT<45 within past 12 mos.     Recent Labs   Lab Test 03/31/23  0959 01/10/22  1319 02/05/21  1031   GFRESTIMATED 35*   < > 33*   GFRESTBLACK  --   --  40*    < > = values in this interval not displayed.       Recent Labs   Lab Test 03/31/23  0959   CR 1.49*             Passed - Patient is age 10 or older        Passed - Patient has documented A1c within the specified period of time.     If HgbA1C is 8 or greater, it needs to be on file within the past 3 months.  If less than 8, must be on file within the past 6 months.     Recent Labs   Lab Test 06/09/23  1140   A1C 7.2*             Passed - Patient does NOT have a diagnosis of CHF.        Passed - Medication is active on med list        Passed - Patient is not pregnant        Passed - Patient has not had a positive pregnancy test within the past 12 mos.         Passed - Recent (6 mo) or future (30 days) visit within the authorizing provider's specialty     Patient had office visit in the last 6 months or has a visit in the next 30 days with authorizing provider or within the authorizing provider's specialty.  See \"Patient Info\" tab in inbasket, or \"Choose Columns\" in Meds & Orders section of the refill encounter.             Signed Prescriptions Disp Refills    KLOR-CON 20 MEQ CR tablet 180 tablet 3     Sig: TAKE 2 TABLETS DAILY       Potassium Supplements Protocol Passed - 6/8/2023 11:42 PM        Passed - Recent (12 mo) or future (30 " "days) visit within the authorizing provider's department     Patient has had an office visit with the authorizing provider or a provider within the authorizing providers department within the previous 12 mos or has a future within next 30 days. See \"Patient Info\" tab in inbasket, or \"Choose Columns\" in Meds & Orders section of the refill encounter.              Passed - Medication is active on med list        Passed - Patient is age 18 or older        Passed - Normal serum potassium in past 12 months     Recent Labs   Lab Test 03/31/23  0959   POTASSIUM 4.0                         ELLIOTT BOOGIE RN 06/09/23 12:52 PM  "

## 2023-06-09 NOTE — PROGRESS NOTES
SUBJECTIVE:   Berta is a 82 year old who presents for Preventive Visit.      6/9/2023     9:47 AM   Additional Questions   Roomed by lindsey lozano   Accompanied by self         6/9/2023     9:47 AM   Patient Reported Additional Medications   Patient reports taking the following new medications none     Berta is an 82-year-old patient with history of chronic renal insufficiency, type 2 diabetes, hypertension, hyperlipidemia, spinal stenosis, regular blood donations and intermittent anemia, she is currently here for a wellness exam.    I last saw her in February.  Because her A1c was trending up and her history of renal insufficiency we added Jardiance to her metformin.  She has been tolerating it well.  She does not check her sugars at home.  A1c in February was 8.3.  She did run out of Jardiance just a couple of days ago.    Blood pressure in the office today is elevated.  This is unusual.  In the past blood pressure was highest in the 130s and lower in the office.  She does not check her blood pressures at home.  She does take hydrochlorothiazide 25 mg lisinopril 20 mg and potassium 20 mg in the evening.  She denies missing her medications.  Currently no pain and no NSAIDs.    Because of her murmur we did have her complete heart ultrasound which showed very mild aortic regurgitation.    Currently she does walk 5000 steps a day and denies any chest pains palpitations shortness of breath or change in her exercise tolerance.    She is up-to-date on her diabetic eye exam at [facility].  She sees Dr. Fischer there.    No problems with hearing, falling or balance.    She does donate blood and last donation was in June.    Since I last saw her she lost 5 pounds, BMI is 26 now.  She is trying to eat healthy.    On exam today she does have very soft Rales at the bases bilaterally, CT scan from 2014 was reviewed and she does have history of bibasilar atelectasis.  She denies any history of asthma or shortness of  "breath with exertion but did have time off for exposure to secondhand smoke from her .    Are you in the first 12 months of your Medicare coverage?  No    Healthy Habits:     In general, how would you rate your overall health?  Good    Frequency of exercise:  6-7 days/week    Duration of exercise:  30-45 minutes    Do you usually eat at least 4 servings of fruit and vegetables a day, include whole grains    & fiber and avoid regularly eating high fat or \"junk\" foods?  Yes    Taking medications regularly:  Yes    Medication side effects:  None    Ability to successfully perform activities of daily living:  No assistance needed    Home Safety:  No safety concerns identified    Hearing Impairment:  No hearing concerns    In the past 6 months, have you been bothered by leaking of urine?  No    In general, how would you rate your overall mental or emotional health?  Excellent      PHQ-2 Total Score: 0    Additional concerns today:  No        Have you ever done Advance Care Planning? (For example, a Health Directive, POLST, or a discussion with a medical provider or your loved ones about your wishes): Yes, patient states has an Advance Care Planning document and will bring a copy to the clinic.  Fall risk  Fallen 2 or more times in the past year?: No  Any fall with injury in the past year?: No    Cognitive Screening   1) Repeat 3 items (Leader, Season, Table)    2) Clock draw: NORMAL  3) 3 item recall: Recalls 3 objects  Results: 3 items recalled: COGNITIVE IMPAIRMENT LESS LIKELY    Mini-CogTM Copyright CAITLIN Patel. Licensed by the author for use in [facility] [place]; reprinted with permission (gena@.[facility]). All rights reserved.      Do you have sleep apnea, excessive snoring or daytime drowsiness?: no    Reviewed and updated as needed this visit by clinical staff   Tobacco  Allergies  Meds              Reviewed and updated as needed this visit by Provider                 Social History     Tobacco Use     " Smoking status: Never     Smokeless tobacco: Never   Vaping Use     Vaping status: Never Used   Substance Use Topics     Alcohol use: No             6/9/2023     9:51 AM   Alcohol Use   Prescreen: >3 drinks/day or >7 drinks/week? Not Applicable     Do you have a current opioid prescription? No  Do you use any other controlled substances or medications that are not prescribed by a provider? None      Current providers sharing in care for this patient include:Patient Care Team:  Gomez Blevins MD as PCP - Dianna Mata MD as Assigned PCP    The following health maintenance items are reviewed in Epic and correct as of today:  Health Maintenance   Topic Date Due     ADVANCE CARE PLANNING  Never done     MEDICARE ANNUAL WELLNESS VISIT  Never done     DTAP/TDAP/TD IMMUNIZATION (2 - Td or Tdap) 02/14/2022     COVID-19 Vaccine (5 - Pfizer series) 05/26/2022     ANNUAL REVIEW OF HM ORDERS  08/06/2022     LIPID  08/03/2023     DIABETIC FOOT EXAM  08/03/2023     A1C  08/08/2023     MICROALBUMIN  02/08/2024     HEMOGLOBIN  02/08/2024     EYE EXAM  02/20/2024     BMP  03/31/2024     FALL RISK ASSESSMENT  06/09/2024     DEXA  04/01/2034     PHQ-2 (once per calendar year)  Completed     INFLUENZA VACCINE  Completed     Pneumococcal Vaccine: 65+ Years  Completed     URINALYSIS  Completed     ZOSTER IMMUNIZATION  Completed     IPV IMMUNIZATION  Aged Out     MENINGITIS IMMUNIZATION  Aged Out     Pertinent mammograms are reviewed under the imaging tab.    Review of Systems   Constitutional: Negative for chills and fever.   HENT: Negative for congestion, ear pain, hearing loss and sore throat.    Eyes: Negative for pain and visual disturbance.   Respiratory: Negative for cough and shortness of breath.    Cardiovascular: Negative for chest pain, palpitations and peripheral edema.   Gastrointestinal: Negative for abdominal pain, constipation, diarrhea, heartburn, hematochezia and nausea.   Breasts:  Negative for  "tenderness, breast mass and discharge.   Genitourinary: Negative for dysuria, frequency, genital sores, hematuria, pelvic pain, urgency, vaginal bleeding and vaginal discharge.   Musculoskeletal: Negative for arthralgias, joint swelling and myalgias.   Skin: Negative for rash.   Neurological: Negative for dizziness, weakness, headaches and paresthesias.   Psychiatric/Behavioral: Negative for mood changes. The patient is not nervous/anxious.    No abdominal pain constipation diarrhea blood in the stool, no vaginal spotting or bleeding.  No urinary problems.    OBJECTIVE:   BP (!) 153/82 (BP Location: Left arm, Patient Position: Sitting, Cuff Size: Adult Large)   Pulse 75   Temp 98.5  F (36.9  C) (Oral)   Resp 15   Ht 1.676 m (5' 6\")   Wt 75.8 kg (167 lb)   LMP  (LMP Unknown)   SpO2 100%   Breastfeeding No   BMI 26.95 kg/m   Estimated body mass index is 26.95 kg/m  as calculated from the following:    Height as of this encounter: 1.676 m (5' 6\").    Weight as of this encounter: 75.8 kg (167 lb).  Physical Exam  General: well appearing female, alert and oriented x3  EYES: Eyelids, conjunctiva, and sclera were normal. Pupils were normal.   HEAD, EARS, NOSE, MOUTH, AND THROAT: no cervical LAD, no thyromegaly or nodules appreciated. TMs are visualized and were obstructed by wax bilaterally, oropharynx is clear.  RESPIRATORY: respirations non labored, CTA bl, no wheezes, very soft Rales at bases bilaterally, most likely due to atelectasis, no forced expiratory wheezing.  CARDIOVASCULAR: Heart rate and rhythm were normal.  Very slight systolic ejection murmur in the right sternal border.  There was no peripheral edema. No carotid bruits.  GASTROINTESTINAL: Positive bowel sounds, abdomen is soft, non tender, non distended.     MUSCULOSKELETAL: Muscle mass was normal for age. No joint synovitis or deformity.  LYMPHATIC: There were no enlarged nodes palpable.  SKIN/HAIR/NAILS: Skin color was normal.  No " rashes.  NEUROLOGIC: The patient was alert and oriented.  Speech was normal.  There is no facial asymmetry.   PSYCHIATRIC:  Mood and affect were normal.   Breast exam: No axilla lymphadenopathy, breast masses or skin changes appreciated.    ASSESSMENT / PLAN:   Berta was seen today for wellness visit and recheck medication.    Diagnoses and all orders for this visit:    Encounter for Medicare annual wellness exam  Bone density was normal 4 years ago which is amazing for her age but will repeated again.  She is up-to-date on everything else.  Discussed to get tetanus shot at the pharmacy.  -     DX Hip/Pelvis/Spine; Future    Stage 3b chronic kidney disease (H)  Creatinine is between 1.4-1.7.  She is on lisinopril potassium and hydrochlorothiazide and Jardiance was recently added.  Will check electrolytes  -     Basic metabolic panel  (Ca, Cl, CO2, Creat, Gluc, K, Na, BUN)    Controlled type 2 diabetes mellitus with stage 3 chronic kidney disease, without long-term current use of insulin (H)  A1c was 8.3 in February.  She is on metformin, Jardiance 10 mg was added.  Goal A1c is less than 8 for her age.  -     empagliflozin (JARDIANCE) 10 MG TABS tablet; Take 1 tablet (10 mg) by mouth daily  -     Basic metabolic panel  (Ca, Cl, CO2, Creat, Gluc, K, Na, BUN)  -     Hemoglobin A1c  -     Lipid panel reflex to direct LDL Fasting  -     Hepatic panel (Albumin, ALT, AST, Bili, Alk Phos, TP)    Primary hypertension  Unclear why blood pressure is high today, I repeated it after she rested for a while and it was still in 150s.  She will monitor it at home.  For now continue hydrochlorothiazide lisinopril and potassium supplements.  We might add to add some Norvasc to her regimen if at home it continues to be high  -     Basic metabolic panel  (Ca, Cl, CO2, Creat, Gluc, K, Na, BUN)    Hyperlipidemia LDL goal <100  She is on Zocor  -     Lipid panel reflex to direct LDL Fasting  -     Hepatic panel (Albumin, ALT, AST, Bili,  "Chon Curtis, TP)    Bilateral impacted cerumen  -     REMOVAL OF IMPACTED WAX MD        Patient has been advised of split billing requirements and indicates understanding: Yes      BMI:   Estimated body mass index is 26.95 kg/m  as calculated from the following:    Height as of this encounter: 1.676 m (5' 6\").    Weight as of this encounter: 75.8 kg (167 lb).         She reports that she has never smoked. She has never used smokeless tobacco.      Appropriate preventive services were discussed with this patient, including applicable screening as appropriate for cardiovascular disease, diabetes, osteopenia/osteoporosis, and glaucoma.  As appropriate for age/gender, discussed screening for colorectal cancer, prostate cancer, breast cancer, and cervical cancer. Checklist reviewing preventive services available has been given to the patient.    Reviewed patients plan of care and provided an AVS. The Basic Care Plan (routine screening as documented in Health Maintenance) for Berta meets the Care Plan requirement. This Care Plan has been established and reviewed with the Patient.    Dianna Lucero MD  [facility]    Identified Health Risks:    I have reviewed Opioid Use Disorder and Substance Use Disorder risk factors and made any needed referrals.     "

## 2023-06-09 NOTE — TELEPHONE ENCOUNTER
"Last Written Prescription Date:  6/13/2022  Last Fill Quantity: 180,  # refills: 3   Last office visit provider:  6/9/2023     Requested Prescriptions   Pending Prescriptions Disp Refills     KLOR-CON 20 MEQ CR tablet [Pharmacy Med Name: KLOR-CON M20 ER TABS 20MEQ] 180 tablet 3     Sig: TAKE 2 TABLETS DAILY       Potassium Supplements Protocol Passed - 6/8/2023 11:42 PM        Passed - Recent (12 mo) or future (30 days) visit within the authorizing provider's department     Patient has had an office visit with the authorizing provider or a provider within the authorizing providers department within the previous 12 mos or has a future within next 30 days. See \"Patient Info\" tab in inbasket, or \"Choose Columns\" in Meds & Orders section of the refill encounter.              Passed - Medication is active on med list        Passed - Patient is age 18 or older        Passed - Normal serum potassium in past 12 months     Recent Labs   Lab Test 03/31/23  0959   POTASSIUM 4.0                       metFORMIN (GLUCOPHAGE) 1000 MG tablet [Pharmacy Med Name: METFORMIN HCL TABS 1000MG] 180 tablet 3     Sig: TAKE 1 TABLET TWICE A DAY WITH MEALS       Biguanide Agents Failed - 6/8/2023 11:42 PM        Failed - Patient's CR is NOT>1.4 OR Patient's EGFR is NOT<45 within past 12 mos.     Recent Labs   Lab Test 03/31/23  0959 01/10/22  1319 02/05/21  1031   GFRESTIMATED 35*   < > 33*   GFRESTBLACK  --   --  40*    < > = values in this interval not displayed.       Recent Labs   Lab Test 03/31/23  0959   CR 1.49*             Passed - Patient is age 10 or older        Passed - Patient has documented A1c within the specified period of time.     If HgbA1C is 8 or greater, it needs to be on file within the past 3 months.  If less than 8, must be on file within the past 6 months.     Recent Labs   Lab Test 06/09/23  1140   A1C 7.2*             Passed - Patient does NOT have a diagnosis of CHF.        Passed - Medication is active on med list " "       Passed - Patient is not pregnant        Passed - Patient has not had a positive pregnancy test within the past 12 mos.         Passed - Recent (6 mo) or future (30 days) visit within the authorizing provider's specialty     Patient had office visit in the last 6 months or has a visit in the next 30 days with authorizing provider or within the authorizing provider's specialty.  See \"Patient Info\" tab in inbasket, or \"Choose Columns\" in Meds & Orders section of the refill encounter.                 ELLIOTT BOOGIE RN 06/09/23 12:51 PM  "

## 2023-06-09 NOTE — PATIENT INSTRUCTIONS
Ask pharmacy about getting a tetanus shot    2. Have DEXA scan done.    3. Get b/p cuff ( large) and measure b/p at home , call in 2 weeks with numbers. Goal b/p <130/80.     4. Continue Jardiance      Patient Education   Personalized Prevention Plan  You are due for the preventive services outlined below.  Your care team is available to assist you in scheduling these services.  If you have already completed any of these items, please share that information with your care team to update in your medical record.  Health Maintenance Due   Topic Date Due    Diptheria Tetanus Pertussis (DTAP/TDAP/TD) Vaccine (2 - Td or Tdap) 02/14/2022    COVID-19 Vaccine (5 - Pfizer series) 05/26/2022    ANNUAL REVIEW OF HM ORDERS  08/06/2022      .

## 2023-06-09 NOTE — NURSING NOTE
RN ear assessment completed prior to ear irrigation. Otoscopic exam reveals cerumen present and irrigation advised. Post Ear Irrigation exam completed and  cerumen plug removed bilaterally. Pain assessment completed.     Patient tolerated procedure:  yes    RN ear assessment completed prior to ear irrigation. Patient was screened for ear irrigations exclusion critera by MD. RN informed the patient to schedule a follow up appointment. Pt expressed understanding.

## 2023-06-12 ENCOUNTER — TELEPHONE (OUTPATIENT)
Dept: INTERNAL MEDICINE | Facility: CLINIC | Age: 83
End: 2023-06-12
Payer: COMMERCIAL

## 2023-06-12 NOTE — TELEPHONE ENCOUNTER
Please let patient know lab results:    Liver function is normal, cholesterol is excellent.  A1c is down from 7.9-7.2.  Kidney function continues to be slow but stable.    Because of this slow kidney function I did not want to adjust her diabetic medications further: We need to decrease her metformin from 1000 mg twice a day to 500 mg twice a day because it is excreted through the kidneys.  She can cut her pills in half or we can call in new tablets 500 mg each.  We should repeat A1c again in 3 months, if sugars are going up again, then we can increase her Jardiance dose.    Please let me know what she prefers and I can put orders in, change her med list..

## 2023-06-13 NOTE — TELEPHONE ENCOUNTER
Relayed provider recommendation to patient. Patient verbalized understanding.  No further questions at this time.    Patient will cut her pills in half for now.

## 2023-06-20 ENCOUNTER — ANCILLARY PROCEDURE (OUTPATIENT)
Dept: BONE DENSITY | Facility: CLINIC | Age: 83
End: 2023-06-20
Attending: INTERNAL MEDICINE
Payer: COMMERCIAL

## 2023-06-20 DIAGNOSIS — Z78.0 POST-MENOPAUSAL: ICD-10-CM

## 2023-06-20 PROCEDURE — 77081 DXA BONE DENSITY APPENDICULR: CPT | Mod: TC | Performed by: PHYSICIAN ASSISTANT

## 2023-06-20 PROCEDURE — 77080 DXA BONE DENSITY AXIAL: CPT | Mod: TC | Performed by: PHYSICIAN ASSISTANT

## 2023-06-23 ENCOUNTER — TELEPHONE (OUTPATIENT)
Dept: INTERNAL MEDICINE | Facility: CLINIC | Age: 83
End: 2023-06-23
Payer: COMMERCIAL

## 2023-06-23 NOTE — TELEPHONE ENCOUNTER
Mailed patient their portion of the application. Patient is going to call me before she mails it into the company.    Sent message to provider

## 2023-06-26 NOTE — TELEPHONE ENCOUNTER
----- Message from Candida Molina sent at 6/26/2023  8:45 AM CDT -----  Regarding: RE: Jardiance PAP  I can only email it or add it to the media tab to be printed of. Faxing makes it in legible when faxing it multiple times  ----- Message -----  From: Dianna Lucero MD  Sent: 6/23/2023   8:17 PM CDT  To: Candida Molina; [facility] [employer]  Subject: RE: Jardiance PAP                                I'm going away on vacation this week.  Faxing would be best.  I'm forwarding this message to my staff so that they can give you best fax number and put the form in my inbox for when I return.    Thank you,  NH    ----- Message -----  From: Candida Molina  Sent: 6/23/2023   1:41 PM CDT  To: Dianna Lucero MD  Subject: RE: Jardiance PAP                                We are trying to get the medication through the free drug program. So I need you to sign off on the providers portion of the application. Can I e-mail you your portion?  ----- Message -----  From: Dianna Lucero MD  Sent: 6/23/2023   1:38 PM CDT  To: Candida Molina  Subject: RE: Jardiance PAP                                Berta Machado can drop off the form at the office or mail it in.   I think she is on Jardiance now, is she paying out of pocket?    Dr PARKINSON  ----- Message -----  From: Candida Molina  Sent: 6/23/2023  10:19 AM CDT  To: Dianna Lucero MD  Subject: Jardiance PAP                                    Hi,    I was wondering what is the best e-mail for me to send you the prescribers portion of the application. I mailed patient their portion of the application but need the prescribers portion completed now. We are trying to get the patient enrolled in the patient assistance program for the medication Jardiance.       Thank you,  Demetria Molina, [facility]  Diabetes Weight Management Clinic Liaison     [facility]    [email or web address]  [email or web address]    Phone: [phone]   Fax: [phone]

## 2023-08-07 DIAGNOSIS — I10 ESSENTIAL HYPERTENSION WITH GOAL BLOOD PRESSURE LESS THAN 140/90: ICD-10-CM

## 2023-08-07 RX ORDER — HYDROCHLOROTHIAZIDE 25 MG/1
25 TABLET ORAL DAILY
Qty: 90 TABLET | Refills: 3 | Status: SHIPPED | OUTPATIENT
Start: 2023-08-07 | End: 2023-11-14

## 2023-08-07 NOTE — TELEPHONE ENCOUNTER
"Routing refill request to provider for review/approval because:  Labs out of range:  BP, Cr    Last Written Prescription Date:  8/16/2022  Last Fill Quantity: 90,  # refills: 3   Last office visit provider:  6/9/2023     Requested Prescriptions   Pending Prescriptions Disp Refills    hydrochlorothiazide (HYDRODIURIL) 25 MG tablet [Pharmacy Med Name: HYDROCHLOROTHIAZIDE TABS 25MG] 90 tablet 3     Sig: TAKE 1 TABLET DAILY       Diuretics (Including Combos) Protocol Failed - 8/7/2023 12:20 AM        Failed - Blood pressure under 140/90 in past 12 months     BP Readings from Last 3 Encounters:   06/09/23 (!) 153/82   02/08/23 118/68   08/03/22 132/79                 Failed - Normal serum creatinine on file in past 12 months     Recent Labs   Lab Test 06/09/23  1140   CR 1.50*              Passed - Recent (12 mo) or future (30 days) visit within the authorizing provider's specialty     Patient has had an office visit with the authorizing provider or a provider within the authorizing providers department within the previous 12 mos or has a future within next 30 days. See \"Patient Info\" tab in inbasket, or \"Choose Columns\" in Meds & Orders section of the refill encounter.              Passed - Medication is active on med list        Passed - Patient is age 18 or older        Passed - No active pregancy on record        Passed - Normal serum potassium on file in past 12 months     Recent Labs   Lab Test 06/09/23  1140   POTASSIUM 4.1                    Passed - Normal serum sodium on file in past 12 months     Recent Labs   Lab Test 06/09/23  1140                 Passed - No positive pregnancy test in past 12 months             Debbie Knutson RN 08/07/23 1:10 AM  "

## 2023-08-18 ENCOUNTER — TELEPHONE (OUTPATIENT)
Dept: INTERNAL MEDICINE | Facility: CLINIC | Age: 83
End: 2023-08-18
Payer: COMMERCIAL

## 2023-08-18 NOTE — TELEPHONE ENCOUNTER
General Call    Contacts         Type Contact Phone/Fax    08/18/2023 02:10 PM CDT Phone (Incoming) Berta Witt (Self) [phone] (M)          Reason for Call:  covid shot-  She would like Dr Lucero to answer    What are your questions or concerns:  Pt has a COVID shot appointment next week and they told her to call us to see if she should wait to get the :new: one in the fall.    Please let her know.    Date of last appointment with provider: 6/9/23    Could we send this information to you in [facility] [employer] or would you prefer to receive a phone call?:   Patient would prefer a phone call   Okay to leave a detailed message?: Yes at Cell number on file:    Telephone Information:   Mobile [phone]

## 2023-08-22 NOTE — TELEPHONE ENCOUNTER
I called and left detailed message for patient with MD recommendations below. Advised to return call with any further questions.

## 2023-08-28 ENCOUNTER — TRANSFERRED RECORDS (OUTPATIENT)
Dept: HEALTH INFORMATION MANAGEMENT | Facility: CLINIC | Age: 83
End: 2023-08-28
Payer: COMMERCIAL

## 2023-08-28 LAB — RETINOPATHY: NEGATIVE

## 2023-09-06 DIAGNOSIS — I10 ESSENTIAL HYPERTENSION WITH GOAL BLOOD PRESSURE LESS THAN 140/90: ICD-10-CM

## 2023-09-07 RX ORDER — LISINOPRIL 20 MG/1
20 TABLET ORAL AT BEDTIME
Qty: 90 TABLET | Refills: 3 | Status: SHIPPED | OUTPATIENT
Start: 2023-09-07 | End: 2023-11-14

## 2023-09-07 NOTE — TELEPHONE ENCOUNTER
"Routing refill request to provider for review/approval because:  Labs out of range:  Creatinine 1.50 on 6/9/2023  BP >140/90 in past 12 months    Last Written Prescription Date:  9/12/2022  Last Fill Quantity: 90,  # refills: 3   Last office visit provider:  6/9/2023     Requested Prescriptions   Pending Prescriptions Disp Refills    lisinopril (ZESTRIL) 20 MG tablet [Pharmacy Med Name: LISINOPRIL TABS 20MG] 90 tablet 3     Sig: TAKE 1 TABLET AT BEDTIME       ACE Inhibitors (Including Combos) Protocol Failed - 9/6/2023 11:51 PM        Failed - Blood pressure under 140/90 in past 12 months     BP Readings from Last 3 Encounters:   06/09/23 (!) 153/82   02/08/23 118/68   08/03/22 132/79                 Failed - Normal serum creatinine on file in past 12 months     Recent Labs   Lab Test 06/09/23  1140   CR 1.50*       Ok to refill medication if creatinine is low          Passed - Recent (12 mo) or future (30 days) visit within the authorizing provider's specialty     Patient has had an office visit with the authorizing provider or a provider within the authorizing providers department within the previous 12 mos or has a future within next 30 days. See \"Patient Info\" tab in inbasket, or \"Choose Columns\" in Meds & Orders section of the refill encounter.              Passed - Medication is active on med list        Passed - Patient is age 18 or older        Passed - No active pregnancy on record        Passed - Normal serum potassium on file in past 12 months     Recent Labs   Lab Test 06/09/23  1140   POTASSIUM 4.1             Passed - No positive pregnancy test within past 12 months             Roxanna Daniels RN 09/06/23 11:54 PM  "

## 2023-09-15 ENCOUNTER — OFFICE VISIT (OUTPATIENT)
Dept: INTERNAL MEDICINE | Facility: CLINIC | Age: 83
End: 2023-09-15
Payer: COMMERCIAL

## 2023-09-15 VITALS
WEIGHT: 175.1 LBS | HEART RATE: 83 BPM | SYSTOLIC BLOOD PRESSURE: 132 MMHG | RESPIRATION RATE: 19 BRPM | TEMPERATURE: 97.6 F | BODY MASS INDEX: 28.14 KG/M2 | HEIGHT: 66 IN | DIASTOLIC BLOOD PRESSURE: 85 MMHG | OXYGEN SATURATION: 99 %

## 2023-09-15 DIAGNOSIS — E11.22 CONTROLLED TYPE 2 DIABETES MELLITUS WITH STAGE 3 CHRONIC KIDNEY DISEASE, WITHOUT LONG-TERM CURRENT USE OF INSULIN (H): ICD-10-CM

## 2023-09-15 DIAGNOSIS — N18.30 CONTROLLED TYPE 2 DIABETES MELLITUS WITH STAGE 3 CHRONIC KIDNEY DISEASE, WITHOUT LONG-TERM CURRENT USE OF INSULIN (H): ICD-10-CM

## 2023-09-15 DIAGNOSIS — I10 PRIMARY HYPERTENSION: Primary | ICD-10-CM

## 2023-09-15 DIAGNOSIS — N18.32 STAGE 3B CHRONIC KIDNEY DISEASE (H): ICD-10-CM

## 2023-09-15 LAB
ANION GAP SERPL CALCULATED.3IONS-SCNC: 14 MMOL/L (ref 7–15)
BASOPHILS # BLD AUTO: 0 10E3/UL (ref 0–0.2)
BASOPHILS NFR BLD AUTO: 0 %
BUN SERPL-MCNC: 35 MG/DL (ref 8–23)
CALCIUM SERPL-MCNC: 9.8 MG/DL (ref 8.8–10.2)
CHLORIDE SERPL-SCNC: 102 MMOL/L (ref 98–107)
CREAT SERPL-MCNC: 1.81 MG/DL (ref 0.51–0.95)
DEPRECATED HCO3 PLAS-SCNC: 26 MMOL/L (ref 22–29)
EGFRCR SERPLBLD CKD-EPI 2021: 27 ML/MIN/1.73M2
EOSINOPHIL # BLD AUTO: 0.2 10E3/UL (ref 0–0.7)
EOSINOPHIL NFR BLD AUTO: 4 %
ERYTHROCYTE [DISTWIDTH] IN BLOOD BY AUTOMATED COUNT: 13.9 % (ref 10–15)
GLUCOSE SERPL-MCNC: 229 MG/DL (ref 70–99)
HBA1C MFR BLD: 8.9 % (ref 0–5.6)
HCT VFR BLD AUTO: 42.2 % (ref 35–47)
HGB BLD-MCNC: 13.6 G/DL (ref 11.7–15.7)
IMM GRANULOCYTES # BLD: 0 10E3/UL
IMM GRANULOCYTES NFR BLD: 0 %
LYMPHOCYTES # BLD AUTO: 1.6 10E3/UL (ref 0.8–5.3)
LYMPHOCYTES NFR BLD AUTO: 33 %
MCH RBC QN AUTO: 30 PG (ref 26.5–33)
MCHC RBC AUTO-ENTMCNC: 32.2 G/DL (ref 31.5–36.5)
MCV RBC AUTO: 93 FL (ref 78–100)
MONOCYTES # BLD AUTO: 0.4 10E3/UL (ref 0–1.3)
MONOCYTES NFR BLD AUTO: 9 %
NEUTROPHILS # BLD AUTO: 2.6 10E3/UL (ref 1.6–8.3)
NEUTROPHILS NFR BLD AUTO: 54 %
PLATELET # BLD AUTO: 199 10E3/UL (ref 150–450)
POTASSIUM SERPL-SCNC: 4.1 MMOL/L (ref 3.4–5.3)
RBC # BLD AUTO: 4.53 10E6/UL (ref 3.8–5.2)
SODIUM SERPL-SCNC: 142 MMOL/L (ref 136–145)
WBC # BLD AUTO: 4.9 10E3/UL (ref 4–11)

## 2023-09-15 PROCEDURE — 83036 HEMOGLOBIN GLYCOSYLATED A1C: CPT | Performed by: INTERNAL MEDICINE

## 2023-09-15 PROCEDURE — 99214 OFFICE O/P EST MOD 30 MIN: CPT | Performed by: INTERNAL MEDICINE

## 2023-09-15 PROCEDURE — 85025 COMPLETE CBC W/AUTO DIFF WBC: CPT | Performed by: INTERNAL MEDICINE

## 2023-09-15 PROCEDURE — 80048 BASIC METABOLIC PNL TOTAL CA: CPT | Performed by: INTERNAL MEDICINE

## 2023-09-15 PROCEDURE — 36415 COLL VENOUS BLD VENIPUNCTURE: CPT | Performed by: INTERNAL MEDICINE

## 2023-09-15 ASSESSMENT — PAIN SCALES - GENERAL: PAINLEVEL: NO PAIN (0)

## 2023-09-15 NOTE — PROGRESS NOTES
Answers submitted by the patient for this visit:  Hypertension Visit (Submitted on 9/15/2023)  Chief Complaint: Chronic problems general questions HPI Form  Do you check your blood pressure regularly outside of the clinic?: No  Are your blood pressures ever more than 140 on the top number (systolic) OR more than 90 on the bottom number (diastolic)? (For example, greater than 140/90): Yes  Are you following a low salt diet?: Yes  General Questionnaire (Submitted on 9/15/2023)  Chief Complaint: Chronic problems general questions HPI Form  How many servings of fruits and vegetables do you eat daily?: 2-3  On average, how many sweetened beverages do you drink each day (Examples: soda, juice, sweet tea, etc.  Do NOT count diet or artificially sweetened beverages)?: 0  How many minutes a day do you exercise enough to make your heart beat faster?: 30 to 60  How many days a week do you exercise enough to make your heart beat faster?: 7  How many days per week do you miss taking your medication?: 0    Assessment & Plan     Primary hypertension  Blood pressure in the office is well controlled, I asked her to get blood pressure large cuff and monitor it at home and bring more readings into the office next time.  We will continue on lisinopril, potassium and hydrochlorothiazide  - Basic metabolic panel  (Ca, Cl, CO2, Creat, Gluc, K, Na, BUN)    Controlled type 2 diabetes mellitus with stage 3 chronic kidney disease, without long-term current use of insulin (H)  Due to renal insufficiency, metformin dose was cut in half since last visit.  She is on a small dose of Jardiance, she is not checking her sugars at home.  We will check A1c, we may need to increase her Jardiance dose.  Consider referring her to nutritionist.  - metFORMIN (GLUCOPHAGE) 1000 MG tablet; Take 0.5 tablets (500 mg) by mouth 2 times daily (with meals)  - Hemoglobin A1c  - Basic metabolic panel  (Ca, Cl, CO2, Creat, Gluc, K, Na, BUN)    Stage 3b chronic kidney  "disease (H)  Creatinine at baseline is between 1.5-1.4.  She does have occasional anemia due to blood donations.  She has not donated blood recently.  - CBC with platelets and differential     BMI:   Estimated body mass index is 28.26 kg/m  as calculated from the following:    Height as of this encounter: 1.676 m (5' 6\").    Weight as of this encounter: 79.4 kg (175 lb 1.6 oz).     Dianna Lucero MD  [facility]    Laura Hampton is a 83 year old, presenting for the following health issues:  Follow Up (3 month follow up to see if patient received tetanus shot, Dexa scan and blood pressure numbers from home. Jardiance has being doing well for her and she has no side effects.  She does not have a BP machine at home to monitor and cannot afford one at this time. Please discuss vaccinations and refer if needed.)      9/15/2023     3:16 PM   Additional Questions   Roomed by Ann Marie JOAQUIN Hampton is an 83-year-old patient with history of chronic renal insufficiency, type 2 diabetes, hypertension, hyperlipidemia, spinal stenosis, regular blood donations and intermittent anemia, she is currently here for a follow-up.    Since I last saw her due to renal insufficiency we did decrease her dose of metformin to 500 mg twice a day.  She is also on a small dose of Jardiance.  Currently she does not check her sugars at home.  Previously A1c was slightly elevated.    Blood pressure today is much better.  She does not have blood pressure cuff at home.  Usually when she checks it at [facility] it is higher than 130.  She is on lisinopril, potassium and hydrochlorothiazide.    She does donate blood periodically but has not done so recently.    History of Present Illness       Hypertension: She presents for follow up of hypertension.  She does not check blood pressure  regularly outside of the clinic. Outside blood pressures have been over 140/90. She follows a low salt diet.     She eats 2-3 servings of " "fruits and vegetables daily.She consumes 0 sweetened beverage(s) daily.She exercises with enough effort to increase her heart rate 30 to 60 minutes per day.  She exercises with enough effort to increase her heart rate 7 days per week.   She is taking medications regularly.       Review of Systems   As above      Objective    /85 (BP Location: Left arm, Patient Position: Sitting, Cuff Size: Adult Large)   Pulse 83   Temp 97.6  F (36.4  C) (Tympanic)   Resp 19   Ht 1.676 m (5' 6\")   Wt 79.4 kg (175 lb 1.6 oz)   LMP  (LMP Unknown)   SpO2 99%   BMI 28.26 kg/m    Body mass index is 28.26 kg/m .  Physical Exam   General: well appearing female, alert and oriented x3  EYES: Eyelids, conjunctiva, and sclera were normal. Pupils were normal.   HEAD, EARS, NOSE, MOUTH, AND THROAT: no cervical LAD, no thyromegaly or nodules appreciated. TMs are visualized and normal, oropharynx is clear.  RESPIRATORY: respirations non labored, CTA bl, no wheezes, rales, no forced expiratory wheezing.  CARDIOVASCULAR: Heart rate and rhythm were normal. No murmurs, rubs,gallops. There was no peripheral edema.   GASTROINTESTINAL: Positive bowel sounds, abdomen is soft, non tender, non distended.     MUSCULOSKELETAL: Muscle mass was normal for age. No joint synovitis or deformity.  LYMPHATIC: There were no enlarged nodes palpable.  SKIN/HAIR/NAILS: Skin color was normal.  No rashes.  NEUROLOGIC: The patient was alert and oriented.  Speech was normal.  There is no facial asymmetry.   PSYCHIATRIC:  Mood and affect were normal.            "

## 2023-09-15 NOTE — PATIENT INSTRUCTIONS
Get covid vaccine and RSV vaccine at the pharmacy.    2. B/p is better today, but I need more data. Please get b/p bicept large cuff at the pharmacy , Goal b/p <140/80.   Bring  records to next visit.    3. Will check A1C since we decreased metformin dose.

## 2023-09-25 ENCOUNTER — TELEPHONE (OUTPATIENT)
Dept: INTERNAL MEDICINE | Facility: CLINIC | Age: 83
End: 2023-09-25
Payer: COMMERCIAL

## 2023-09-25 DIAGNOSIS — N18.30 CONTROLLED TYPE 2 DIABETES MELLITUS WITH STAGE 3 CHRONIC KIDNEY DISEASE, WITHOUT LONG-TERM CURRENT USE OF INSULIN (H): ICD-10-CM

## 2023-09-25 DIAGNOSIS — N18.32 STAGE 3B CHRONIC KIDNEY DISEASE (H): Primary | ICD-10-CM

## 2023-09-25 DIAGNOSIS — E11.22 CONTROLLED TYPE 2 DIABETES MELLITUS WITH STAGE 3 CHRONIC KIDNEY DISEASE, WITHOUT LONG-TERM CURRENT USE OF INSULIN (H): ICD-10-CM

## 2023-09-25 DIAGNOSIS — E87.6 HYPOKALEMIA: ICD-10-CM

## 2023-09-25 RX ORDER — POTASSIUM CHLORIDE 1500 MG/1
20 TABLET, EXTENDED RELEASE ORAL DAILY
Qty: 180 TABLET | Refills: 3
Start: 2023-09-25 | End: 2023-11-14

## 2023-09-25 RX ORDER — LANCETS
EACH MISCELLANEOUS
Qty: 102 EACH | Refills: 6 | Status: SHIPPED | OUTPATIENT
Start: 2023-09-25 | End: 2024-06-14

## 2023-09-25 RX ORDER — LANCING DEVICE/LANCETS
KIT MISCELLANEOUS
Qty: 1 EACH | Refills: 0 | Status: SHIPPED | OUTPATIENT
Start: 2023-09-25 | End: 2024-06-14

## 2023-09-25 RX ORDER — GLUCOSAMINE HCL/CHONDROITIN SU 500-400 MG
CAPSULE ORAL
Qty: 100 EACH | Refills: 6 | Status: SHIPPED | OUTPATIENT
Start: 2023-09-25 | End: 2024-06-14

## 2023-09-25 NOTE — TELEPHONE ENCOUNTER
Spoke with pt, discussed results:    Kidney function is worse, A1C is worse.  Recently decreased metformin due renal insufficiency and added Jardiance 10 mg.  Currently her creatinine increased from 1.5-1.8.  She was not fasting for that blood work.  Potassium was good.  Of note she is on lisinopril, hydrochlorothiazide and currently takes potassium supplement 20 mEq twice a day.    Discussed to decrease potassium supplement to only 1 pill a day.  I would like to repeat her kidney function and potassium levels in 10 days.    Additionally I would like her to see nutritionist.  She has not been eating cookies or candy but has been buying more cornbread lately which is also high in sugar.    I would like her to check her sugars at least twice a day before meals.  Has not been using glucometer and does not have it at home.  Diabetic supplies was sent to the pharmacy and referral to diabetic educator was placed.    Please contact her to schedule lab appointment 10 days.  See if she wants to get flu shot at the same time.

## 2023-09-26 ENCOUNTER — TELEPHONE (OUTPATIENT)
Dept: INTERNAL MEDICINE | Facility: CLINIC | Age: 83
End: 2023-09-26

## 2023-09-26 DIAGNOSIS — E11.22 CONTROLLED TYPE 2 DIABETES MELLITUS WITH STAGE 3 CHRONIC KIDNEY DISEASE, WITHOUT LONG-TERM CURRENT USE OF INSULIN (H): Primary | ICD-10-CM

## 2023-09-26 DIAGNOSIS — N18.30 CONTROLLED TYPE 2 DIABETES MELLITUS WITH STAGE 3 CHRONIC KIDNEY DISEASE, WITHOUT LONG-TERM CURRENT USE OF INSULIN (H): Primary | ICD-10-CM

## 2023-09-26 NOTE — TELEPHONE ENCOUNTER
General Call      Reason for Call:  pt calling and stating she needs test strips  (Accu Chek) to be sent into her pharmacy  (Do not see them on her med list)    [facility]    What are your questions or concerns:  n/a    Date of last appointment with provider: n/a    Could we send this information to you in [employer] or would you prefer to receive a phone call?:   Patient would prefer a phone call   Okay to leave a detailed message?: Yes at Cell number on file:    Telephone Information:   Mobile [phone]

## 2023-10-05 ENCOUNTER — LAB (OUTPATIENT)
Dept: LAB | Facility: CLINIC | Age: 83
End: 2023-10-05
Payer: COMMERCIAL

## 2023-10-05 DIAGNOSIS — N18.32 STAGE 3B CHRONIC KIDNEY DISEASE (H): ICD-10-CM

## 2023-10-05 LAB
ANION GAP SERPL CALCULATED.3IONS-SCNC: 16 MMOL/L (ref 7–15)
BUN SERPL-MCNC: 31.9 MG/DL (ref 8–23)
CALCIUM SERPL-MCNC: 9.7 MG/DL (ref 8.8–10.2)
CHLORIDE SERPL-SCNC: 102 MMOL/L (ref 98–107)
CREAT SERPL-MCNC: 1.78 MG/DL (ref 0.51–0.95)
DEPRECATED HCO3 PLAS-SCNC: 25 MMOL/L (ref 22–29)
EGFRCR SERPLBLD CKD-EPI 2021: 28 ML/MIN/1.73M2
GLUCOSE SERPL-MCNC: 141 MG/DL (ref 70–99)
POTASSIUM SERPL-SCNC: 3.9 MMOL/L (ref 3.4–5.3)
SODIUM SERPL-SCNC: 143 MMOL/L (ref 135–145)

## 2023-10-05 PROCEDURE — 36415 COLL VENOUS BLD VENIPUNCTURE: CPT

## 2023-10-05 PROCEDURE — 80048 BASIC METABOLIC PNL TOTAL CA: CPT

## 2023-10-09 ENCOUNTER — TRANSFERRED RECORDS (OUTPATIENT)
Dept: HEALTH INFORMATION MANAGEMENT | Facility: CLINIC | Age: 83
End: 2023-10-09
Payer: COMMERCIAL

## 2023-10-11 ENCOUNTER — TELEPHONE (OUTPATIENT)
Dept: INTERNAL MEDICINE | Facility: CLINIC | Age: 83
End: 2023-10-11
Payer: COMMERCIAL

## 2023-10-11 DIAGNOSIS — N18.32 STAGE 3B CHRONIC KIDNEY DISEASE (H): Primary | ICD-10-CM

## 2023-10-11 NOTE — TELEPHONE ENCOUNTER
Discussed results with patient, creatinine function has stabilized, she will continue on 10 mg of Jardiance for now and small dose of metformin.  She is meeting with diabetic educator to talk about glucometer use and nutritionist tomorrow.  I asked her to repeat kidney function again in 1 month.    Please ask her to set up lab appointment in the month.  Orders placed.

## 2023-10-12 ENCOUNTER — ALLIED HEALTH/NURSE VISIT (OUTPATIENT)
Dept: EDUCATION SERVICES | Facility: CLINIC | Age: 83
End: 2023-10-12
Payer: COMMERCIAL

## 2023-10-12 DIAGNOSIS — E11.22 CONTROLLED TYPE 2 DIABETES MELLITUS WITH STAGE 3 CHRONIC KIDNEY DISEASE, WITHOUT LONG-TERM CURRENT USE OF INSULIN (H): ICD-10-CM

## 2023-10-12 DIAGNOSIS — N18.30 CONTROLLED TYPE 2 DIABETES MELLITUS WITH STAGE 3 CHRONIC KIDNEY DISEASE, WITHOUT LONG-TERM CURRENT USE OF INSULIN (H): ICD-10-CM

## 2023-10-12 PROCEDURE — G0108 DIAB MANAGE TRN  PER INDIV: HCPCS | Performed by: DIETITIAN, REGISTERED

## 2023-10-12 NOTE — PROGRESS NOTES
Diabetes Self-Management Education & Support/ 60  minutes    Presents for: Individual review    Type of Service: In Person Visit    Assessment Type:   ASSESSMENT:  Berta reported that she has had Diabetes for over 10 years. She is careful about what she is eating and is walking daily for 5k steps or more. Berta reports of no s/s of hyperglycemia.  She is currently taking 1000 mg of Metformin and Jardiance 10 mg.  Berta was instructed on using the Accu check guide me meter today: KU=065 mg/dl. Her A1C has increased in recent months, and she is frustrated by what she may be doing wrong. WE discussed the need of more of different medications as Diabetes progresses.  We will discuss her BG readings at our follow up and writer will consult with PCP/MTM regarding medication options at that time. She is up to date on eye and dental exams.      Patient's most recent   Lab Results   Component Value Date    A1C 8.9 09/15/2023     is not meeting goal of <8.0    Diabetes knowledge and skills assessment:   Patient is knowledgeable in diabetes management concepts related to: Healthy Eating, Being Active, Taking Medication, and Reducing Risks    Continue education with the following diabetes management concepts: Monitoring    Based on learning assessment above, most appropriate setting for further diabetes education would be: Individual setting.      PLAN  test blood sugar either in the morning before eating, the goal is to be under 130 or two hours after one of your meals, the goal is to be under 180.    Limit your carbohydrate foods to 1/4 of your plate.  Fill 1/2 of your plate with non-starchy vegetables.      Topics to cover at upcoming visits: Monitoring and Taking Medication    Follow-up: 10/27/23    See Care Plan for co-developed, patient-state behavior change goals.  AVS provided for patient today.    Education Materials Provided:  Living Healthy with Diabetes and My Plate Planner      SUBJECTIVE/OBJECTIVE:  Presents  "for: Individual review  Accompanied by: Self  Diabetes education in the past 24mo: No  Focus of Visit: Monitoring, Reducing Risks, Taking Medication, Healthy Eating, Being Active  Diabetes type: Type 2  Date of diagnosis: over 10 years ago per patient  Disease course: Other  How confident are you filling out medical forms by yourself:: Quite a bit  Other concerns:: None  Cultural Influences/Ethnic Background:  Not  or       Diabetes Symptoms & Complications:  Fatigue: No  Neuropathy: No  Polydipsia: No  Polyphagia: No  Polyuria: No  Visual change: No  Slow healing wounds: No  Autonomic neuropathy: No  CVA: No  Heart disease: No    Patient Problem List and Family Medical History reviewed for relevant medical history, current medical status, and diabetes risk factors.    Vitals:  LMP  (LMP Unknown)   Estimated body mass index is 28.26 kg/m  as calculated from the following:    Height as of 9/15/23: 1.676 m (5' 6\").    Weight as of 9/15/23: 79.4 kg (175 lb 1.6 oz).   Last 3 BP:   BP Readings from Last 3 Encounters:   09/15/23 132/85   06/09/23 (!) 153/82   02/08/23 118/68       History   Smoking Status    Never   Smokeless Tobacco    Never       Labs:  Lab Results   Component Value Date    A1C 8.9 09/15/2023     Lab Results   Component Value Date     10/05/2023     01/10/2022     Lab Results   Component Value Date    LDL 64 06/09/2023     Direct Measure HDL   Date Value Ref Range Status   06/09/2023 73 >=50 mg/dL Final   ]  GFR Estimate   Date Value Ref Range Status   10/05/2023 28 (L) >60 mL/min/1.73m2 Final   02/05/2021 33 (L) >60 mL/min/1.73m2 Final     GFR Estimate If Black   Date Value Ref Range Status   02/05/2021 40 (L) >60 mL/min/1.73m2 Final     Lab Results   Component Value Date    CR 1.78 10/05/2023     No results found for: \"MICROALBUMIN\"    Healthy Eating:  Cultural/Cheondoism diet restrictions?: No  Meal planning/habits: Heart healthy, Smaller portions  How many times a week on " average do you eat food made away from home (restaurant/take-out)?: 0  Meals include: Lunch, Dinner, Evening Snack, Breakfast  Breakfast: banana/ One bar  Lunch: scrambled eggs, 1/2 pitat with garlic sauce, somekind of fruit,  Dinner: chicken, zucchini,  Snacks:  joes yogurt, 1/2 One Bar  Beverages: Water, Coffee    Being Active:  Being Active Assessed Today: Yes  Exercise:: Yes (walks at least 5k/day)  Days per week of moderate to strenuous exercise (like a brisk walk): (P) 7    Monitoring:  Monitoring Assessed Today: Yes (patient is not currently checking her blood sugar at home.)        Taking Medications:  Diabetes Medication(s)       Biguanides       metFORMIN (GLUCOPHAGE) 1000 MG tablet    Take 0.5 tablets (500 mg) by mouth 2 times daily (with meals)      Sodium-Glucose Co-Transporter 2 (SGLT2) Inhibitors       empagliflozin (JARDIANCE) 10 MG TABS tablet    Take 1 tablet (10 mg) by mouth daily            Taking Medication Assessed Today: Yes  Current Treatments: Oral Medication (taken by mouth), Diet  Problems taking diabetes medications regularly?: No    Problem Solving:                 Reducing Risks:  Diabetes Risks: Age over 45 years, Family History, Ethnicity  CAD Risks: Diabetes Mellitus, Dyslipidemia, Family history, Hypertension, Obesity  Has dilated eye exam at least once a year?: Yes  Sees dentist every 6 months?: Yes    Healthy Coping:  Emotional response to diabetes: Ready to learn, Concern for health and well-being  Informal Support system:: Children, [place] based, Family, Friends, Neighbors  Stage of change: ACTION (Actively working towards change)  Patient Activation Measure Survey Score:       No data to display                  Care Plan and Education Provided:  Care Plan: Diabetes   Updates made by Bhumika Jackson RD since 10/12/2023 12:00 AM        Problem: HbA1C Not In Goal         Goal: Establish Regular Follow-Ups with PCP         Task: Discuss with PCP the recommended timing for  patient's next follow up visit(s)    Responsible User: Bhumika Jackson RD        Task: Discuss schedule for PCP visits with patient    Responsible User: Bhumika Jackson RD        Goal: Get HbA1C Level in Goal         Task: Educate patient on diabetes education self-management topics    Responsible User: Bhumika Jackson RD        Task: Educate patient on benefits of regular glucose monitoring Completed 10/12/2023   Responsible User: Bhumika Jackson RD        Task: Refer patient to appropriate extended care team member, as needed (Medication Therapy Management, Behavioral Health, Physical Therapy, etc.)    Responsible User: Bhumika Jackson RD        Task: Discuss diabetes treatment plan with patient    Responsible User: Bhumika Jackson RD        Problem: Diabetes Self-Management Education Needed to Optimize Self-Care Behaviors         Goal: Understand diabetes pathophysiology and disease progression         Task: Provide education on diabetes pathophysiology and disease progression specfic to patient's diabetes type    Responsible User: Bhumika Jackson RD        Goal: Healthy Eating - follow a healthy eating pattern for diabetes         Task: Provide education on portion control and consistency in amount, composition and timing of food intake    Responsible User: Bhumika Jackson RD        Task: Provide education on managing carbohydrate intake (carbohydrate counting, plate planning method, etc.)    Responsible User: Bhumika Jackson RD        Task: Provide education on weight management    Responsible User: Bhumika Jackson RD        Task: Provide education on heart healthy eating    Responsible User: Bhumika Jackson RD        Task: Provide education on eating out    Responsible User: Bhumika Jackson RD        Task: Develop individualized healthy eating plan with patient    Responsible User: Bhumika Jackson RD        Goal: Being Active - get regular physical activity, working up to at least 150 minutes per week          Task: Provide education on relationship of activity to glucose and precautions to take if at risk for low glucose    Responsible User: Bhumika Jackson RD        Task: Discuss barriers to physical activity with patient    Responsible User: Bhumika Jackson RD        Task: Develop physical activity plan with patient    Responsible User: Bhumika Jackson RD        Task: Explore community resources including walking groups, assistance programs, and home videos    Responsible User: Bhumika Jackson RD        Goal: Monitoring - monitor glucose and ketones as directed    This Visit's Progress: 0%   Note:    Test blood sugar once daily: alternate between fasting and two hours after a meal.        Task: Provide education on blood glucose monitoring (purpose, proper technique, frequency, glucose targets, interpreting results, when to use glucose control solution, sharps disposal)    Responsible User: Bhumika Jackson RD        Task: Provide education on continuous glucose monitoring (sensor placement, use of torres or /reader, understanding glucose trends, alerts and alarms, differences between sensor glucose and blood glucose)    Responsible User: Bhumika Jackson RD        Task: Provide education on ketone monitoring (when to monitor, frequency, etc.)    Responsible User: Bhumika Jackson RD        Goal: Taking Medication - patient is consistently taking medications as directed         Task: Provide education on action of prescribed medication, including when to take and possible side effects    Responsible User: Bhumika Jackson RD        Task: Provide education on insulin and injectable diabetes medications, including administration, storage, site selection and rotation for injection sites    Responsible User: Bhumika Jackson RD        Task: Discuss barriers to medication adherence with patient and provide management technique ideas as appropriate    Responsible User: Bhumika Jackson RD        Task: Provide education on  frequency and refill details of medications    Responsible User: Bhumika Jackson RD        Goal: Problem Solving - know how to prevent and manage short-term diabetes complications         Task: Provide education on high blood glucose - causes, signs/symptoms, prevention and treatment    Responsible User: Bhumika Jackson RD        Task: Provide education on low blood glucose - causes, signs/symptoms, prevention, treatment, carrying a carbohydrate source at all times, and medical identification    Responsible User: Bhumika Jackson RD        Task: Provide education on safe travel with diabetes    Responsible User: Bhumika Jackson RD        Task: Provide education on how to care for diabetes on sick days    Responsible User: Bhumika Jackson RD        Task: Provide education on when to call a health care provider    Responsible User: Bhumika Jackson RD        Goal: Reducing Risks - know how to prevent and treat long-term diabetes complications         Task: Provide education on major complications of diabetes, prevention, early diagnostic measures and treatment of complications    Responsible User: Bhumika Jackson RD        Task: Provide education on recommended care for dental, eye and foot health    Responsible User: Bhumika Jackson RD        Task: Provide education on Hemoglobin A1c - goals and relationship to blood glucose levels    Responsible User: Bhumika Jackson RD        Task: Provide education on recommendations for heart health - lipid levels and goals, blood pressure and goals, and aspirin therapy, if indicated    Responsible User: Bhumika Jackson RD        Task: Provide education on tobacco cessation    Responsible User: Bhumika Jackson RD        Goal: Healthy Coping - use available resources to cope with the challenges of managing diabetes         Task: Discuss recognizing feelings about having diabetes    Responsible User: Bhumika Jackson RD        Task: Provide education on the benefits of making  appropriate lifestyle changes    Responsible User: Bhumika Jackson RD        Task: Provide education on benefits of utilizing support systems    Responsible User: Bhumika Jackson RD        Task: Discuss methods for coping with stress    Responsible User: Bhumika Jackson RD        Task: Provide education on when to seek professional counseling    Responsible User: Bhumika Jackson RD              Time Spent: 60 minutes  Encounter Type: Individual    Any diabetes medication dose changes were made via the CDE Protocol per the patient's primary care provider. A copy of this encounter was shared with the provider.

## 2023-10-12 NOTE — PATIENT INSTRUCTIONS
Test blood sugar either in the morning before eating, the goal is to be under 130 or two hours after one of your meals, the goal is to be under 180.    Limit your carbohydrate foods to 1/4 of your plate.  Fill 1/2 of your plate with non-starchy vegetables.

## 2023-10-12 NOTE — LETTER
10/12/2023         RE: Berta Witt  [address] [facility] [address] [place] [id number]        Dear Colleague,    Thank you for referring your patient, Berta Witt, to the [facility]. Please see a copy of my visit note below.    Diabetes Self-Management Education & Support/ 60  minutes    Presents for: Individual review    Type of Service: In Person Visit    Assessment Type:   ASSESSMENT:  Berta reported that she has had Diabetes for over 10 years. She is careful about what she is eating and is walking daily for 5k steps or more. Berta reports of no s/s of hyperglycemia.  She is currently taking 1000 mg of Metformin and Jardiance 10 mg.  Berta was instructed on using the Accu check guide me meter today: XP=320 mg/dl. Her A1C has increased in recent months, and she is frustrated by what she may be doing wrong. WE discussed the need of more of different medications as Diabetes progresses.  We will discuss her BG readings at our follow up and writer will consult with PCP/MTM regarding medication options at that time. She is up to date on eye and dental exams.      Patient's most recent   Lab Results   Component Value Date    A1C 8.9 09/15/2023     is not meeting goal of <8.0    Diabetes knowledge and skills assessment:   Patient is knowledgeable in diabetes management concepts related to: Healthy Eating, Being Active, Taking Medication, and Reducing Risks    Continue education with the following diabetes management concepts: Monitoring    Based on learning assessment above, most appropriate setting for further diabetes education would be: Individual setting.      PLAN  test blood sugar either in the morning before eating, the goal is to be under 130 or two hours after one of your meals, the goal is to be under 180.    Limit your carbohydrate foods to 1/4 of your plate.  Fill 1/2 of your plate with non-starchy vegetables.      Topics to cover at upcoming visits: Monitoring and  "Taking Medication    Follow-up: 10/27/23    See Care Plan for co-developed, patient-state behavior change goals.  AVS provided for patient today.    Education Materials Provided:  Living Healthy with Diabetes and My Plate Planner      SUBJECTIVE/OBJECTIVE:  Presents for: Individual review  Accompanied by: Self  Diabetes education in the past 24mo: No  Focus of Visit: Monitoring, Reducing Risks, Taking Medication, Healthy Eating, Being Active  Diabetes type: Type 2  Date of diagnosis: over 10 years ago per patient  Disease course: Other  How confident are you filling out medical forms by yourself:: Quite a bit  Other concerns:: None  Cultural Influences/Ethnic Background:  Not  or       Diabetes Symptoms & Complications:  Fatigue: No  Neuropathy: No  Polydipsia: No  Polyphagia: No  Polyuria: No  Visual change: No  Slow healing wounds: No  Autonomic neuropathy: No  CVA: No  Heart disease: No    Patient Problem List and Family Medical History reviewed for relevant medical history, current medical status, and diabetes risk factors.    Vitals:  LMP  (LMP Unknown)   Estimated body mass index is 28.26 kg/m  as calculated from the following:    Height as of 9/15/23: 1.676 m (5' 6\").    Weight as of 9/15/23: 79.4 kg (175 lb 1.6 oz).   Last 3 BP:   BP Readings from Last 3 Encounters:   09/15/23 132/85   06/09/23 (!) 153/82   02/08/23 118/68       History   Smoking Status     Never   Smokeless Tobacco     Never       Labs:  Lab Results   Component Value Date    A1C 8.9 09/15/2023     Lab Results   Component Value Date     10/05/2023     01/10/2022     Lab Results   Component Value Date    LDL 64 06/09/2023     Direct Measure HDL   Date Value Ref Range Status   06/09/2023 73 >=50 mg/dL Final   ]  GFR Estimate   Date Value Ref Range Status   10/05/2023 28 (L) >60 mL/min/1.73m2 Final   02/05/2021 33 (L) >60 mL/min/1.73m2 Final     GFR Estimate If Black   Date Value Ref Range Status   02/05/2021 40 (L) " ">60 mL/min/1.73m2 Final     Lab Results   Component Value Date    CR 1.78 10/05/2023     No results found for: \"MICROALBUMIN\"    Healthy Eating:  Cultural/Latter-day diet restrictions?: No  Meal planning/habits: Heart healthy, Smaller portions  How many times a week on average do you eat food made away from home (restaurant/take-out)?: 0  Meals include: Lunch, Dinner, Evening Snack, Breakfast  Breakfast: banana/ One bar  Lunch: scrambled eggs, 1/2 pitat with garlic sauce, somekind of fruit,  Dinner: chicken, zucchini,  Snacks:  joes yogurt, 1/2 One Bar  Beverages: Water, Coffee    Being Active:  Being Active Assessed Today: Yes  Exercise:: Yes (walks at least 5k/day)  Days per week of moderate to strenuous exercise (like a brisk walk): (P) 7    Monitoring:  Monitoring Assessed Today: Yes (patient is not currently checking her blood sugar at home.)        Taking Medications:  Diabetes Medication(s)       Biguanides       metFORMIN (GLUCOPHAGE) 1000 MG tablet    Take 0.5 tablets (500 mg) by mouth 2 times daily (with meals)      Sodium-Glucose Co-Transporter 2 (SGLT2) Inhibitors       empagliflozin (JARDIANCE) 10 MG TABS tablet    Take 1 tablet (10 mg) by mouth daily            Taking Medication Assessed Today: Yes  Current Treatments: Oral Medication (taken by mouth), Diet  Problems taking diabetes medications regularly?: No    Problem Solving:                 Reducing Risks:  Diabetes Risks: Age over 45 years, Family History, Ethnicity  CAD Risks: Diabetes Mellitus, Dyslipidemia, Family history, Hypertension, Obesity  Has dilated eye exam at least once a year?: Yes  Sees dentist every 6 months?: Yes    Healthy Coping:  Emotional response to diabetes: Ready to learn, Concern for health and well-being  Informal Support system:: Children, Octavia based, Family, Friends, Neighbors  Stage of change: ACTION (Actively working towards change)  Patient Activation Measure Survey Score:       No data to display          "         Care Plan and Education Provided:  Care Plan: Diabetes   Updates made by Bhumika Jackson RD since 10/12/2023 12:00 AM        Problem: HbA1C Not In Goal         Goal: Establish Regular Follow-Ups with PCP         Task: Discuss with PCP the recommended timing for patient's next follow up visit(s)    Responsible User: Bhumika Jackson RD        Task: Discuss schedule for PCP visits with patient    Responsible User: Bhumika Jackson RD        Goal: Get HbA1C Level in Goal         Task: Educate patient on diabetes education self-management topics    Responsible User: Bhumika Jackson RD        Task: Educate patient on benefits of regular glucose monitoring Completed 10/12/2023   Responsible User: Bhumika Jackson RD        Task: Refer patient to appropriate extended care team member, as needed (Medication Therapy Management, Behavioral Health, Physical Therapy, etc.)    Responsible User: Bhumika Jackson RD        Task: Discuss diabetes treatment plan with patient    Responsible User: Bhumkia Jackson RD        Problem: Diabetes Self-Management Education Needed to Optimize Self-Care Behaviors         Goal: Understand diabetes pathophysiology and disease progression         Task: Provide education on diabetes pathophysiology and disease progression specfic to patient's diabetes type    Responsible User: Bhumika Jackson RD        Goal: Healthy Eating - follow a healthy eating pattern for diabetes         Task: Provide education on portion control and consistency in amount, composition and timing of food intake    Responsible User: Bhumika Jackson RD        Task: Provide education on managing carbohydrate intake (carbohydrate counting, plate planning method, etc.)    Responsible User: Bhumika Jackson RD        Task: Provide education on weight management    Responsible User: Bhumika Jackson RD        Task: Provide education on heart healthy eating    Responsible User: Bhumika Jackson RD        Task: Provide education on  eating out    Responsible User: Bhumika Jackson RD        Task: Develop individualized healthy eating plan with patient    Responsible User: Bhumika Jackson RD        Goal: Being Active - get regular physical activity, working up to at least 150 minutes per week         Task: Provide education on relationship of activity to glucose and precautions to take if at risk for low glucose    Responsible User: Bhumika Jackson RD        Task: Discuss barriers to physical activity with patient    Responsible User: Bhumika Jackson RD        Task: Develop physical activity plan with patient    Responsible User: Bhumika Jackson RD        Task: Explore community resources including walking groups, assistance programs, and home videos    Responsible User: Bhumika Jackson RD        Goal: Monitoring - monitor glucose and ketones as directed    This Visit's Progress: 0%   Note:    Test blood sugar once daily: alternate between fasting and two hours after a meal.        Task: Provide education on blood glucose monitoring (purpose, proper technique, frequency, glucose targets, interpreting results, when to use glucose control solution, sharps disposal)    Responsible User: Bhumika Jackson RD        Task: Provide education on continuous glucose monitoring (sensor placement, use of torres or /reader, understanding glucose trends, alerts and alarms, differences between sensor glucose and blood glucose)    Responsible User: Bhumika Jackson RD        Task: Provide education on ketone monitoring (when to monitor, frequency, etc.)    Responsible User: Bhumika Jackson RD        Goal: Taking Medication - patient is consistently taking medications as directed         Task: Provide education on action of prescribed medication, including when to take and possible side effects    Responsible User: Bhumika Jackson RD        Task: Provide education on insulin and injectable diabetes medications, including administration, storage, site selection  and rotation for injection sites    Responsible User: Bhumika Jackson RD        Task: Discuss barriers to medication adherence with patient and provide management technique ideas as appropriate    Responsible User: Bhumika Jackson RD        Task: Provide education on frequency and refill details of medications    Responsible User: Bhumika Jackson RD        Goal: Problem Solving - know how to prevent and manage short-term diabetes complications         Task: Provide education on high blood glucose - causes, signs/symptoms, prevention and treatment    Responsible User: Bhumika Jackson RD        Task: Provide education on low blood glucose - causes, signs/symptoms, prevention, treatment, carrying a carbohydrate source at all times, and medical identification    Responsible User: Bhumika Jackson RD        Task: Provide education on safe travel with diabetes    Responsible User: Bhumika Jackson RD        Task: Provide education on how to care for diabetes on sick days    Responsible User: Bhumika Jackson RD        Task: Provide education on when to call a health care provider    Responsible User: Bhumika Jackson RD        Goal: Reducing Risks - know how to prevent and treat long-term diabetes complications         Task: Provide education on major complications of diabetes, prevention, early diagnostic measures and treatment of complications    Responsible User: Bhumika Jackson RD        Task: Provide education on recommended care for dental, eye and foot health    Responsible User: Bhumika Jackson RD        Task: Provide education on Hemoglobin A1c - goals and relationship to blood glucose levels    Responsible User: Bhumika Jackson RD        Task: Provide education on recommendations for heart health - lipid levels and goals, blood pressure and goals, and aspirin therapy, if indicated    Responsible User: Bhumika Jackson RD        Task: Provide education on tobacco cessation    Responsible User: Bhumika Jackson RD         Goal: Healthy Coping - use available resources to cope with the challenges of managing diabetes         Task: Discuss recognizing feelings about having diabetes    Responsible User: Bhumika Jackson RD        Task: Provide education on the benefits of making appropriate lifestyle changes    Responsible User: Bhumika Jackson RD        Task: Provide education on benefits of utilizing support systems    Responsible User: Bhumika Jackson RD        Task: Discuss methods for coping with stress    Responsible User: Bhumika Jackson RD        Task: Provide education on when to seek professional counseling    Responsible User: Bhumika Jackson RD              Time Spent: 60 minutes  Encounter Type: Individual    Any diabetes medication dose changes were made via the CDE Protocol per the patient's primary care provider. A copy of this encounter was shared with the provider.

## 2023-10-20 ENCOUNTER — OFFICE VISIT (OUTPATIENT)
Dept: INTERNAL MEDICINE | Facility: CLINIC | Age: 83
End: 2023-10-20
Payer: COMMERCIAL

## 2023-10-20 VITALS
BODY MASS INDEX: 27.64 KG/M2 | RESPIRATION RATE: 12 BRPM | HEART RATE: 62 BPM | WEIGHT: 172 LBS | OXYGEN SATURATION: 99 % | DIASTOLIC BLOOD PRESSURE: 74 MMHG | HEIGHT: 66 IN | TEMPERATURE: 97.2 F | SYSTOLIC BLOOD PRESSURE: 130 MMHG

## 2023-10-20 DIAGNOSIS — H61.21 IMPACTED CERUMEN OF RIGHT EAR: ICD-10-CM

## 2023-10-20 DIAGNOSIS — F43.9 STRESS: ICD-10-CM

## 2023-10-20 DIAGNOSIS — G89.29 CHRONIC MIDLINE LOW BACK PAIN WITHOUT SCIATICA: Primary | ICD-10-CM

## 2023-10-20 DIAGNOSIS — E11.22 CONTROLLED TYPE 2 DIABETES MELLITUS WITH STAGE 3 CHRONIC KIDNEY DISEASE, WITHOUT LONG-TERM CURRENT USE OF INSULIN (H): ICD-10-CM

## 2023-10-20 DIAGNOSIS — N18.30 CHRONIC RENAL INSUFFICIENCY, STAGE 3 (MODERATE) (H): ICD-10-CM

## 2023-10-20 DIAGNOSIS — M54.50 CHRONIC MIDLINE LOW BACK PAIN WITHOUT SCIATICA: Primary | ICD-10-CM

## 2023-10-20 DIAGNOSIS — N18.30 CONTROLLED TYPE 2 DIABETES MELLITUS WITH STAGE 3 CHRONIC KIDNEY DISEASE, WITHOUT LONG-TERM CURRENT USE OF INSULIN (H): ICD-10-CM

## 2023-10-20 LAB
ANION GAP SERPL CALCULATED.3IONS-SCNC: 13 MMOL/L (ref 7–15)
BUN SERPL-MCNC: 32.2 MG/DL (ref 8–23)
CALCIUM SERPL-MCNC: 10.1 MG/DL (ref 8.8–10.2)
CHLORIDE SERPL-SCNC: 102 MMOL/L (ref 98–107)
CREAT SERPL-MCNC: 1.61 MG/DL (ref 0.51–0.95)
DEPRECATED HCO3 PLAS-SCNC: 28 MMOL/L (ref 22–29)
EGFRCR SERPLBLD CKD-EPI 2021: 31 ML/MIN/1.73M2
GLUCOSE SERPL-MCNC: 141 MG/DL (ref 70–99)
POTASSIUM SERPL-SCNC: 3.7 MMOL/L (ref 3.4–5.3)
SODIUM SERPL-SCNC: 143 MMOL/L (ref 135–145)

## 2023-10-20 PROCEDURE — 80048 BASIC METABOLIC PNL TOTAL CA: CPT | Performed by: INTERNAL MEDICINE

## 2023-10-20 PROCEDURE — 99214 OFFICE O/P EST MOD 30 MIN: CPT | Performed by: INTERNAL MEDICINE

## 2023-10-20 PROCEDURE — 36415 COLL VENOUS BLD VENIPUNCTURE: CPT | Performed by: INTERNAL MEDICINE

## 2023-10-20 RX ORDER — RESPIRATORY SYNCYTIAL VIRUS VACCINE 120MCG/0.5
0.5 KIT INTRAMUSCULAR ONCE
Qty: 1 EACH | Refills: 0 | Status: CANCELLED | OUTPATIENT
Start: 2023-10-20 | End: 2023-10-20

## 2023-10-20 ASSESSMENT — ENCOUNTER SYMPTOMS: BACK PAIN: 1

## 2023-10-20 NOTE — PROGRESS NOTES
"  Assessment & Plan     Chronic midline low back pain without sciatica  Episode of lower back sprain, spasm on the right side, no radiculopathy.  Discussed preferentially to use Tylenol over NSAIDs due to renal insufficiency.  We will refer her to physical therapy.  - Physical Therapy Referral; Future    Controlled type 2 diabetes mellitus with stage 3 chronic kidney disease, without long-term current use of insulin (H)  She is on small dose of metformin and Jardiance, since she has been on Jardiance, creatinine has been trending a little higher sore was her sugar but she recently adjusted her diet and fasting sugar less than 130 and 2 hours postprandial is 179.  We will follow-up again in 2 months.    Chronic renal insufficiency, stage 3 (moderate) (H)  Continue to monitor.  She is on lisinopril HCTZ and potassium, since kidney function has worsened we decrease potassium to once a day tablet, will see if we can stop it completely.  - Basic metabolic panel  (Ca, Cl, CO2, Creat, Gluc, K, Na, BUN)    Stress  She plans to travel to [facility] in November to see her daughter.  Currently denies depression.    Impacted cerumen of right ear  - Adult ENT  Referral; Future    Cardiac murmur.  She did have heart ultrasound done in March which showed mild aortic stenosis.    BMI:   Estimated body mass index is 27.76 kg/m  as calculated from the following:    Height as of this encounter: 1.676 m (5' 6\").    Weight as of this encounter: 78 kg (172 lb).       Dianna Lucero MD  [facility]    Laura Hampton is a 83 year old, presenting for the following health issues:  Back Pain (Lower back pain X 10 days, previous back surgery)      10/20/2023    11:54 AM   Additional Questions   Roomed by Angely   Accompanied by N/A         10/20/2023    11:54 AM   Patient Reported Additional Medications   Patient reports taking the following new medications Advil/Tylenol combination     irdie is " an 83-year-old patient with history of chronic renal insufficiency, type 2 diabetes, hypertension, hyperlipidemia, spinal stenosis, regular blood donations and intermittent anemia, mild aortic stenosis, she is currently here for acute visit due to episode of severe right-sided back pain.    Geraldo has had problems with her back in the past and has had back surgery.  Several days ago she developed severe right paraspinal back pain and had difficulty moving.  Took ibuprofen 2 tablets for a few days but it did not help, her daughter recommended Advil plus Tylenol tablet which she takes a couple of times and currently pain has resolved in the last 3-4 days and she stopped taking the medication.  She denies radiculopathy.  On exam and previous pain was located in the area of the right lumbar paraspinal muscle, most likely back sprain versus spasm.  Discussed a course of physical therapy.    For diabetes she continues on low-dose of Jardiance and metformin.  Creatinine has been an issue.  Sugars at home have been less than 130 and 2 hours after eating less than 200.    She is still on lisinopril and 1 tablet of potassium as well as hydrochlorothiazide.  Due to worsening kidney function will check creatinine and potassium levels today.    Has been more stressed lately because of her daughter who lives in [facility] and plans to travel there in November.  She denies any depression and sleeps well.    She was recently seen by dermatology and has a mole removed on her left toe, currently she is using topical mupirocin as there is no sign of infection.  She got a normal report on that.    History of Present Illness       Back Pain:  She presents for follow up of back pain. Patient's back pain is a recurring problem.  Location of back pain:  Right lower back  Description of back pain: dull ache, shooting and stabbing  Back pain spreads: nowhere    Since patient first noticed back pain, pain is: gradually improving  Does back  "pain interfere with her job:  No       She eats 2-3 servings of fruits and vegetables daily.She consumes 1 sweetened beverage(s) daily.She exercises with enough effort to increase her heart rate 20 to 29 minutes per day.  She exercises with enough effort to increase her heart rate 5 days per week.   She is taking medications regularly.       Review of Systems   Musculoskeletal:  Positive for back pain.          Objective    /74 (BP Location: Left arm, Patient Position: Sitting, Cuff Size: Adult Large)   Pulse 62   Temp 97.2  F (36.2  C) (Tympanic)   Resp 12   Ht 1.676 m (5' 6\")   Wt 78 kg (172 lb)   SpO2 99%   Breastfeeding No   BMI 27.76 kg/m    Body mass index is 27.76 kg/m .  Physical Exam   General: well appearing female, alert and oriented x3  EYES: Eyelids, conjunctiva, and sclera were normal. Pupils were normal.   HEAD, EARS, NOSE, MOUTH, AND THROAT: no cervical LAD, no thyromegaly or nodules appreciated. TMs are visualized and normal, oropharynx is clear.  RESPIRATORY: respirations non labored, CTA bl, no wheezes, rales, no forced expiratory wheezing.  CARDIOVASCULAR: Heart rate and rhythm were normal.  Noted systolic murmur noted in the right and left sternal border.. There was no peripheral edema. No carotid bruits.  GASTROINTESTINAL: Positive bowel sounds, abdomen is soft, non tender, non distended.     MUSCULOSKELETAL: Muscle mass was normal for age. No joint synovitis or deformity.  No tenderness to palpation of her lumbar spine but previously pain was located in the right lumbar lower paraspinal area, straight leg raise test is negative.  No SI tenderness.  LYMPHATIC: There were no enlarged nodes palpable.  SKIN/HAIR/NAILS: Skin color was normal.  No rashes.  NEUROLOGIC: The patient was alert and oriented.  Speech was normal.  There is no facial asymmetry.   PSYCHIATRIC:  Mood and affect were normal.          "

## 2023-10-20 NOTE — PATIENT INSTRUCTIONS
Low back pain: sprain vs muscle spasm, try PT, can Use tylenol  regualrly as needed but avoid Ibuprofen/Alveve/Advil frequently due to slow kidney function.    2. Will check kidneys today.    3. See ENT to clean out right ear.

## 2023-10-23 ENCOUNTER — TELEPHONE (OUTPATIENT)
Dept: INTERNAL MEDICINE | Facility: CLINIC | Age: 83
End: 2023-10-23
Payer: COMMERCIAL

## 2023-10-23 DIAGNOSIS — N18.30 CONTROLLED TYPE 2 DIABETES MELLITUS WITH STAGE 3 CHRONIC KIDNEY DISEASE, WITHOUT LONG-TERM CURRENT USE OF INSULIN (H): ICD-10-CM

## 2023-10-23 DIAGNOSIS — E11.22 CONTROLLED TYPE 2 DIABETES MELLITUS WITH STAGE 3 CHRONIC KIDNEY DISEASE, WITHOUT LONG-TERM CURRENT USE OF INSULIN (H): ICD-10-CM

## 2023-10-23 NOTE — PROGRESS NOTES
PHYSICAL THERAPY EVALUATION  Type of Visit: Evaluation    See electronic medical record for Abuse and Falls Screening details.    Subjective       Presenting condition or subjective complaint:    Acute low back pain that has since resolved.  Has not had another episode of pain since surgery many years ago.  Right sided pain started after lifting and doing too much one day.  Has not had any pain since taking Advil/Tylenol mix 1x 1 week ago.  Has been feeling good for the last week.  Had left toe surgery for mole removal (2-3 weeks ago) - has not been walking as much since then - but is not having pain in her foot.  Pt usually walks 5,000 steps a day.  Usually walks outside, in the winter uses the treadmill or walks inside.  Doesn't do anything else for exercise.  Has a raised bed and gardens at home.  Pt is nervous to start lifting again.        MD: Chronic midline low back pain without sciatica  Episode of lower back sprain, spasm on the right side, no radiculopathy. Geraldo has had problems with her back in the past and has had back surgery.  Several days ago she developed severe right paraspinal back pain and had difficulty moving.  Took ibuprofen 2 tablets for a few days but it did not help, her daughter recommended Advil plus Tylenol tablet which she takes a couple of times and currently pain has resolved in the last 3-4 days and she stopped taking the medication.  She denies radiculopathy.  On exam and previous pain was located in the area of the right lumbar paraspinal muscle, most likely back sprain versus spasm.   Date of onset:      Relevant medical history:   history of chronic renal insufficiency, type 2 diabetes, hypertension, hyperlipidemia, spinal stenosis, regular blood donations and intermittent anemia, mild aortic stenosis    Dates & types of surgery:  Lumbar laminectomy (Bilateral) Procedure: BILATERAL L3-5 DECOMPRESSION LAMINECTOMY;  Surgeon: Eric Vásquez MD;  Location: [facility];   "Service:  Date Unknown  Arthroscopy shoulder rotator cuff repair (Bilateral)  Date Unknown  Back surgery  Date Unknown   [Other] (Right) fatty tumor excision  right axilla  Date Unknown  Polypectomy  Date Unknown  Z excis cerv disk,one level Description: Laminectomy With Disc Removal;  Recorded: 08/26/2014;  Date Unknown  [facility] w/o facetec foramot/dskc 1/2 vrt seg, cervical Description: Laminectomy Lumbar;  Proc Date: 08/01/2014;  Comments: bilateral L3-L5      Prior diagnostic imaging/testing results:       Prior therapy history for the same diagnosis, illness or injury:        Prior Level of Function  Transfers:   Ambulation:   ADL:   IADL:     Living Environment  Social support:   lives alone   Type of home:   apartment/condo   Stairs to enter the home:       none  Ramp:     Stairs inside the home:       none  Help at home:  none - family as needed  Equipment owned:       Employment:    no  Hobbies/Interests:   volunteer, Jehovah's witness, gardening, reading club     Patient goals for therapy:   strengthening exercises     Pain assessment: Pain denied     Objective   LUMBAR SPINE EVALUATION  PAIN: Pain Level at Rest: 0/10  Pain Level with Use: 0/10  Pt was having 9/10 pain when this first happened   INTEGUMENTARY (edema, incisions):   POSTURE: WFL  GAIT:   Weightbearing Status:   Assistive Device(s): None  Gait Deviations: WNL  BALANCE/PROPRIOCEPTION: Single Leg Stance Eyes Open (seconds): 18 seconds on R and 6 seconds on L  / NBOS EO and EC 20 seconds - WFL   WEIGHTBEARING ALIGNMENT:   NON-WEIGHTBEARING ALIGNMENT:    ROM:   (Degrees) Left AROM Left PROM  Right AROM Right PROM   Hip Flexion       Hip Extension       Hip Abduction       Hip Adduction       Hip Internal Rotation       Hip External Rotation       Knee Flexion       Knee Extension       Lumbar Side glide WFL - fingertips to lateral joint line     Lumbar Flexion 6\" fingertips to floor    Lumbar Extension 75% limited    Pain:   End feel:   PELVIC/SI SCREEN: "   STRENGTH:     MYOTOMES:    Left Right   T12-L3 (Hip Flexion) 4+ 4+   L2-4 (Quads)      L4 (Ankle DF) 5 5   L5 (Great Toe Ext)     S1 (Toe Raise) Limited on left - did have surgery recently  10 reps    Hip flexion: 4  Knee flexion: 4+  Knee ext 5  Hip abd   DTR S:   CORD SIGNS:   DERMATOMES:   NEURAL TENSION:  SLR negative   FLEXIBILITY:   LUMBAR/HIP Special Tests:    PELVIS/SI SPECIAL TESTS:   FUNCTIONAL TESTS:   PALPATION:   SPINAL SEGMENTAL CONCLUSIONS:       Assessment & Plan   CLINICAL IMPRESSIONS  Medical Diagnosis:      Treatment Diagnosis:     Impression/Assessment: Patient is a 83 year old female with acute right sided low back pain that has resolved in the last 1 week.  The following significant findings have been identified:  mild balance deficits, weakness in hips . Pt currently has no impairments.   Clinical Decision Making (Complexity)  Clinical Presentation: Stable/Uncomplicated  Clinical Presentation Rationale: based on medical and personal factors listed in PT evaluation  Clinical Decision Making (Complexity): Low complexity    PLAN OF CARE  Treatment Interventions:  Interventions: Manual Therapy, Neuromuscular Re-education, Therapeutic Activity, Therapeutic Exercise, Self-Care/Home Management    Long Term Goals            Frequency of Treatment:    Duration of Treatment:      Recommended Referrals to Other Professionals:   Education Assessment:        Risks and benefits of evaluation/treatment have been explained.   Patient/Family/caregiver agrees with Plan of Care.     Evaluation Time:             Signing Clinician: aZ Faulkner, PT      [facility]                                                                                   OUTPATIENT PHYSICAL THERAPY      PLAN OF TREATMENT FOR OUTPATIENT REHABILITATION   Patient's Last Name, First Name, Berta Smith YOB: 1940   Provider's Name   [facility]   Medical  Record No.  [id number]     Onset Date:    Start of Care Date:       Medical Diagnosis:         PT Treatment Diagnosis:    Plan of Treatment  Frequency/Duration:  /      Certification date from   to           See note for plan of treatment details and functional goals     Za Faulkner, PT                         I CERTIFY THE NEED FOR THESE SERVICES FURNISHED UNDER        THIS PLAN OF TREATMENT AND WHILE UNDER MY CARE     (Physician attestation of this document indicates review and certification of the therapy plan).                Referring Provider:  Dianna Lucero      Initial Assessment  See Epic Evaluation-

## 2023-10-23 NOTE — TELEPHONE ENCOUNTER
General Call    Contacts         Type Contact Phone/Fax    10/23/2023 12:54 PM CDT Phone (Incoming) Berta Witt (Self) [phone] (M)          Reason for Call:  pt is calling for different medication or how to get less expensive-it is over $100 and she can't afford that.     What are your questions or concerns:     Disp Refills Start End MAX   empagliflozin (JARDIANCE) 10 MG TABS tablet 90 tablet 3 6/9/2023  No   Sig - Route: Take 1 tablet (10 mg) by mouth daily - Oral   Sent to pharmacy as: Empagliflozin 10 MG Oral Tablet (Jardiance)   Class: E-Prescribe   Order: [id number]   E-Prescribing Status: Receipt confirmed by pharmacy (6/9/2023 10:18 AM CDT)       Date of last appointment with provider: 10/20/23    Could we send this information to you in [facility] or would you prefer to receive a phone call?:   Patient would prefer a phone call   Okay to leave a detailed message?: Yes at Cell number on file:    Telephone Information:   Mobile [phone]

## 2023-10-25 ENCOUNTER — THERAPY VISIT (OUTPATIENT)
Dept: PHYSICAL THERAPY | Facility: REHABILITATION | Age: 83
End: 2023-10-25
Attending: INTERNAL MEDICINE
Payer: COMMERCIAL

## 2023-10-25 DIAGNOSIS — G89.29 CHRONIC MIDLINE LOW BACK PAIN WITHOUT SCIATICA: ICD-10-CM

## 2023-10-25 DIAGNOSIS — M54.50 CHRONIC MIDLINE LOW BACK PAIN WITHOUT SCIATICA: ICD-10-CM

## 2023-10-25 PROCEDURE — 97161 PT EVAL LOW COMPLEX 20 MIN: CPT | Mod: GP | Performed by: PHYSICAL THERAPIST

## 2023-10-25 PROCEDURE — 97110 THERAPEUTIC EXERCISES: CPT | Mod: GP | Performed by: PHYSICAL THERAPIST

## 2023-10-25 NOTE — PROGRESS NOTES
10/25/23 0500   Appointment Info   Signing clinician's name / credentials Za Faulkner, PT, DPT   Total/Authorized Visits 12   Visits Used 1   Medical Diagnosis chronic midline low back pain without sciatica   Progress Note/Certification   Onset of illness/injury or Date of Surgery 10/25/23   Therapy Frequency up to 4 sessions over 8 weeks as needed   Predicted Duration 8 weeks   GOALS   PT Goals 2;3;4   PT Goal 1   Goal Identifier HEP   Goal Description Pt will be independent in HEP to manage symptoms   Rationale to maximize safety and independence within the home   Target Date 12/20/23   Subjective Report   Subjective Report see eval   Objective Measures   Objective Measures Objective Measure 1;Objective Measure 2;Objective Measure 3;Objective Measure 4;Objective Measure 5;Objective Measure 6   Treatment Interventions (PT)   Interventions Therapeutic Procedure/Exercise;Manual Therapy;Self Care/Home Management;Neuromuscular Re-education   Therapeutic Procedure/Exercise   Therapeutic Procedures: strength, endurance, ROM, flexibillity minutes (92171) 15   Patient Response/Progress understanding, tolerated well   PTRx Ther Proc 1 Clamshell with Theraband   PTRx Ther Proc 1 - Details added to HEP today - trialed without and without L1 band 10-15 reps    PTRx Ther Proc 2 Bridging With Theraband   PTRx Ther Proc 2 - Details added to HEP - L1 band above knee - every other day    PTRx Ther Proc 3 Supine Hamstring Stretch   PTRx Ther Proc 3 - Details can use black band to stretch - place at ball of foot - daily - can keep opposite knee bent - trialed with and without band    Neuromuscular Re-education   Neuromuscular re-ed of mvmt, balance, coord, kinesthetic sense, posture, proprioception minutes (82170) 3   PTRx Neuro Re-ed 1 Balance Single Leg Stance Supported and Unsupported   PTRx Neuro Re-ed 1 - Details added to HEP today - perform a couple times a day    Manual Therapy   Manual Therapy Manual Therapy 2    Eval/Assessments   PT Eval, Low Complexity Minutes (33900) 28   Education   Learner/Method Patient   Plan   Home program see PTRX   Plan for next session 1 more session for progress HEP. Pt is no longer having pain but would like strengthening and stretching ex's.  Add abdominal stretching, hip flexor strengthening, sit<>stand or squats.  Review current HEP and assess if pt needs   Comments   Comments Impression/Assessment: Patient is a 83 year old female with acute right sided low back pain that has resolved in the last 1 week.  The following significant findings have been identified:  mild balance deficits, weakness in hips . Pt currently has no impairments.   Total Session Time   Timed Code Treatment Minutes 18   Total Treatment Time (sum of timed and untimed services) 46

## 2023-10-25 NOTE — TELEPHONE ENCOUNTER
Spoke with pt.    Creatinine is improving .    In Regards to price , we'll try to sent R to [place] pharmacy to see if they can apply discount or medication assistance program.  If that does not help with the price, she can always send her prescription to [facility] to see if that will be cheaper.  Patient is agreeable.

## 2023-11-07 ENCOUNTER — THERAPY VISIT (OUTPATIENT)
Dept: PHYSICAL THERAPY | Facility: REHABILITATION | Age: 83
End: 2023-11-07
Payer: COMMERCIAL

## 2023-11-07 DIAGNOSIS — M54.50 CHRONIC MIDLINE LOW BACK PAIN WITHOUT SCIATICA: Primary | ICD-10-CM

## 2023-11-07 DIAGNOSIS — G89.29 CHRONIC MIDLINE LOW BACK PAIN WITHOUT SCIATICA: Primary | ICD-10-CM

## 2023-11-07 PROCEDURE — 97110 THERAPEUTIC EXERCISES: CPT | Mod: GP | Performed by: PHYSICAL THERAPIST

## 2023-11-07 NOTE — PROGRESS NOTES
DISCHARGE  Reason for Discharge: Patient has met all goals.    Equipment Issued: theraband    Discharge Plan: Patient to continue home program.    Referring Provider:  Dianna Lucero         11/07/23 0500   Appointment Info   Signing clinician's name / credentials Za Faulkner, PT, DPT   Total/Authorized Visits 4   Visits Used 2   Medical Diagnosis chronic midline low back pain without sciatica   Progress Note/Certification   Onset of illness/injury or Date of Surgery 10/25/23   Therapy Frequency up to 4 sessions over 8 weeks as needed   Predicted Duration 8 weeks   GOALS   PT Goals 2;3;4   PT Goal 1   Goal Identifier HEP   Goal Description Pt will be independent in HEP to manage symptoms   Rationale to maximize safety and independence within the home   Goal Progress progressed today and pt demo and verbalized understanding   Target Date 12/20/23   Date Met 11/07/23   Subjective Report   Subjective Report Pt reports she continues to do well and does not have any pain. She is not sure if she is doing the exercises correctly.   Objective Measures   Objective Measures Objective Measure 1;Objective Measure 2;Objective Measure 3;Objective Measure 4;Objective Measure 5;Objective Measure 6   Treatment Interventions (PT)   Interventions Therapeutic Procedure/Exercise;Manual Therapy;Self Care/Home Management;Neuromuscular Re-education   Therapeutic Procedure/Exercise   Therapeutic Procedures: strength, endurance, ROM, flexibillity minutes (00165) 30   PTRx Ther Proc 1 Hip AROM Standing Abduction   PTRx Ther Proc 1 - Details x 10 reps B   PTRx Ther Proc 2 Sit to Stand Two Hands   PTRx Ther Proc 2 - Details x 5 reps with misha UE support education to work on decreasing UE support as able over time   PTRx Ther Proc 3 Knee Bends   PTRx Ther Proc 3 - Details x 5 reps cues on foot position initially   Patient Response/Progress good tolerance to exercise, demo correctly   Skilled Intervention Progression of HEP for  abdominal and LE strengthening to improve function   PTRx Ther Proc 4 Supine Abdominal Exercise #3B (Two Leg Marching)   PTRx Ther Proc 4 - Details x 8 reps - initially tactile cues for good TA set   PTRx Ther Proc 5 Clamshell with Theraband   PTRx Ther Proc 5 - Details caused pain with knee so switched to standing strengthening   PTRx Ther Proc 6 Bridging With Theraband   PTRx Ther Proc 6 - Details x 10 reps with L1 band above knee - every other day  pt can do 20-30 repetitions- work up to red band   PTRx Ther Proc 7 Supine Hamstring Stretch   PTRx Ther Proc 7 - Details can use black band to stretch - place at ball of foot - daily - can keep opposite knee bent - x 30 sec hold initially cues with position   Neuromuscular Re-education   Neuromuscular re-ed of mvmt, balance, coord, kinesthetic sense, posture, proprioception minutes (24955) 2   PTRx Neuro Re-ed 1 Balance Single Leg Stance Supported and Unsupported   PTRx Neuro Re-ed 1 - Details Reviewed do with finger tip support and start to lift up hands as able   Skilled Intervention review of static balance to reduce falls risk   Patient Response/Progress verbalized understanding   Manual Therapy   Manual Therapy Manual Therapy 2   Education   Learner/Method Patient   Plan   Home program see PTRX   Plan for next session discharge to self-management   Total Session Time   Timed Code Treatment Minutes 32   Total Treatment Time (sum of timed and untimed services) 32

## 2023-11-08 ENCOUNTER — LAB (OUTPATIENT)
Dept: LAB | Facility: CLINIC | Age: 83
End: 2023-11-08
Payer: COMMERCIAL

## 2023-11-08 DIAGNOSIS — E11.22 CONTROLLED TYPE 2 DIABETES MELLITUS WITH STAGE 3 CHRONIC KIDNEY DISEASE, WITHOUT LONG-TERM CURRENT USE OF INSULIN (H): ICD-10-CM

## 2023-11-08 DIAGNOSIS — N18.30 CONTROLLED TYPE 2 DIABETES MELLITUS WITH STAGE 3 CHRONIC KIDNEY DISEASE, WITHOUT LONG-TERM CURRENT USE OF INSULIN (H): ICD-10-CM

## 2023-11-08 LAB
ANION GAP SERPL CALCULATED.3IONS-SCNC: 12 MMOL/L (ref 7–15)
BUN SERPL-MCNC: 34.4 MG/DL (ref 8–23)
CALCIUM SERPL-MCNC: 10 MG/DL (ref 8.8–10.2)
CHLORIDE SERPL-SCNC: 102 MMOL/L (ref 98–107)
CREAT SERPL-MCNC: 1.94 MG/DL (ref 0.51–0.95)
DEPRECATED HCO3 PLAS-SCNC: 26 MMOL/L (ref 22–29)
EGFRCR SERPLBLD CKD-EPI 2021: 25 ML/MIN/1.73M2
GLUCOSE SERPL-MCNC: 122 MG/DL (ref 70–99)
HBA1C MFR BLD: 8 % (ref 0–5.6)
POTASSIUM SERPL-SCNC: 4.1 MMOL/L (ref 3.4–5.3)
SODIUM SERPL-SCNC: 140 MMOL/L (ref 135–145)

## 2023-11-08 PROCEDURE — 83036 HEMOGLOBIN GLYCOSYLATED A1C: CPT

## 2023-11-08 PROCEDURE — 80048 BASIC METABOLIC PNL TOTAL CA: CPT

## 2023-11-08 PROCEDURE — 36415 COLL VENOUS BLD VENIPUNCTURE: CPT

## 2023-11-10 ENCOUNTER — TELEPHONE (OUTPATIENT)
Dept: INTERNAL MEDICINE | Facility: CLINIC | Age: 83
End: 2023-11-10
Payer: COMMERCIAL

## 2023-11-10 DIAGNOSIS — N18.30 CONTROLLED TYPE 2 DIABETES MELLITUS WITH STAGE 3 CHRONIC KIDNEY DISEASE, WITHOUT LONG-TERM CURRENT USE OF INSULIN (H): Primary | ICD-10-CM

## 2023-11-10 DIAGNOSIS — E11.22 CONTROLLED TYPE 2 DIABETES MELLITUS WITH STAGE 3 CHRONIC KIDNEY DISEASE, WITHOUT LONG-TERM CURRENT USE OF INSULIN (H): Primary | ICD-10-CM

## 2023-11-10 DIAGNOSIS — N18.4 CKD (CHRONIC KIDNEY DISEASE) STAGE 4, GFR 15-29 ML/MIN (H): ICD-10-CM

## 2023-11-10 NOTE — TELEPHONE ENCOUNTER
Attempted to contact patient. No answer, left message to return call to clinic.       Once patient returns call- please inform patient that medication recommendations will be discussed at visit on 11/14 with pharmacist. Patient may be able to ask if pharmacy has any assistance programs for Januvia. Otherwise, can be discussed at visit. Patient should continue to monitor blood sugars as directed and can contact nurse triage over the weekend if blood sugars become elevated.

## 2023-11-10 NOTE — TELEPHONE ENCOUNTER
Patient Returning Call    Reason for call:  Pt called stating the new medication is $300 as she can't afford that . She was wondering if there is a different medication that is cheaper she can try?            Could we send this information to you in [facility] or would you prefer to receive a phone call?:   Patient would prefer a phone call   Okay to leave a detailed message?: Yes at Cell number on file:    Telephone Information:   Mobile [phone]

## 2023-11-10 NOTE — TELEPHONE ENCOUNTER
Spoke with patient and relayed message from Dr. Lucero.       Patient provided number for [place] pharmacy to check on price and delivery date. Advised to contact clinic if medication is too expensive or delivery date is not soon as we will need to send medication into a local pharmacy.       Patient scheduled for 3 week lab follow up 11/30      Patient scheduled with pharmacist at [facility] 11/14.      Patient wrote down all instructions and repeated them back to writer without error.       Patient had no further questions at time of call.

## 2023-11-10 NOTE — TELEPHONE ENCOUNTER
Yola, it looks good. When our coordinators schedule patient they do check if patient's insurance covers MTM, and that is why we prefer our team to call patient or patient call the team.     Thanks,  Velma

## 2023-11-10 NOTE — TELEPHONE ENCOUNTER
Carter PARKINSON,    I agree with discontinuing both medication. Sometimes Jardiance could lower the creatinine at first, and then after a year it preserves it.   -- I think unless the GFR is below 25; I would have continued Jardiance considering it benefits on the long - run.     ----Prices are based on a one-month supply. Patient is in the donut hole.     Januvia - $142.38   Onglyza - $92.43   Tradjenta - not covered     Byetta - $214.70   Bydureon - $209.01   Victoza - $290.56   Trulicity - $242.19   Ozempic - $243.46   Rybelsus - $243.46     - Every medication seems to be expensive considering she is on the donut hole . We can try glipizide XL for now, and also if she is okay long acting insulin?    Thanks,  Velma

## 2023-11-10 NOTE — TELEPHONE ENCOUNTER
Please let pt know,    A1C is better: down from 8.9 to 8. However her kidney function is worse.     Because of worsening kidney function, I would like her to stop Jardiance pill ( it looks like it was expensive any way).    Instead of Jardiance , I would like her to try Januvia pill 25 mg a day. I sent Rx to [facility] to see if they can help with price on this med.(Please give her their phone number, she will need to contact them to check on med price).    Until kidney function gets better, she should also hold Metformin for now and hold potassium supplement.    Continue with strict diabetic diet while we are adjusting meds.    Have repeat lab work done to check kidney function in 3 weeks (order placed).     And schedule appt with our pharmacist to help with DM control in light of worsening kidney function since medication options are limited (I put referral in).     Please make sure pt is able to repeat plan and is scheduled for repeat lab work.

## 2023-11-10 NOTE — TELEPHONE ENCOUNTER
Velma, Thank you for seeing her so quickly.    Berta has been on metformin 1000mg BID  in the past , due to slightly elevated A1c and chronic renal insufficiency, I decided to add Jardiance and decrease her metformin dose, that is when there are troubles with creatinine started.    Since then her diabetes control has worsened but she did improve her diet and recent A1c was 8.  However due to continued worsening of her creatinine, we are now taking her off Jardiance and metformin.  Certainly we can restart metformin once her kidney function is better but right now she is not on any diabetic medications.    If Januvia is too expensive, I am not sure what other options are except for small dose of long-acting insulin.  I did send prescription for Januvia to [place] pharmacy hoping that maybe they can do help her with the price reduction.    Let me know if you have any questions on her when you see her next week.    GREGOR, I also asked her to temporarily stop her potassium supplement as well.

## 2023-11-10 NOTE — PROGRESS NOTES
Medication Therapy Management (MTM) Encounter    ASSESSMENT:                            Medication Adherence/Access: No issues identified.She has a container that she puts her medications for the next day. She got a pillbox, but has not used it yet.     Diabetes Type II:  A1C slightly within goal of < 8. Considering patient's renal function, appropriate to discontinue  metformin and Jardiance;however, to control sugar readings appropriate to add glipizide XL 5 mg daily and we can increase that to twice daily based on sugar readings. Advised patient to check your blood sugar.     Hypertension: In clinic blood pressure within goal of < 130/80.Considering Jardiance is discontinued, patient would need a maximum protection of kidney function and maximum dose of lisinopril would provide that. Appropriate to increase lisinopril dose and also decrease hydrochlorothiazide dose to lower the risk of low blood pressure. Advised patient to check her blood pressure.     Hyperlipidemia: Stable on simvastatin 10 mg daily      PLAN:                          Stop taking metformin, Jardiance and potassium supplements  Start checking your sugar readings in the morning before eating breakfast, and also check when you feel low sugar symptoms like dizziness, shakiness and irritability   Start taking glipizide 5 mg XL in the morning for now and considering how your sugar is we will increase it to twice daily   Increase lisinopril dose to 40 mg for maximum kidney protection   Decrease hydrochlorothiazide dose from 25 mg to 12.5 mg   Considering checking your blood pressure at least once daily       Follow-up: Due on 12/05/2023    SUBJECTIVE/OBJECTIVE:                          Berta Witt is a 83 year old female seen for an initial visit. She was referred to me from Dr. Lucero.      Reason for visit: Medication review and Diabetes.    Allergies/ADRs: Reviewed in chart  Past Medical History: Reviewed in chart  Tobacco: She reports that  she has never smoked. She has never used smokeless tobacco.    Medication Adherence/Access: She has a container that she puts her medications for the next day. She got a pillbox, but has not used it yet.     Diabetes  Type II: Currently not taking metformin and Jardiance due to low renal function.She is also not taking Januvia.  Patient does not like injecting insulin.   Not taking aspirin due age  Blood sugar monitoring: never  Current diabetes symptoms: none  Diet/Exercise: She said she eats healthy; she does not eat a lot of proteins and she eats a lot fruits and veggies. She said she is really conscious of what she eats. He never had a sweat stuff, but ate once at  [employer]. If she has sweats at home she would eat them though. She adds honey in her coffee in the morning. Patient will be going to [place] for the rest of November.      Eye exam is up to date  Foot exam: due  Urine Albumin:   Lab Results   Component Value Date    UMALCR  02/08/2023      Comment:      Unable to calculate, urine albumin and/or urine creatinine is outside detectable limits.  Microalbuminuria is defined as an albumin:creatinine ratio of 17 to 299 for males and 25 to 299 for females. A ratio of albumin:creatinine of 300 or higher is indicative of overt proteinuria.  Due to biologic variability, positive results should be confirmed by a second, first-morning random or 24-hour timed urine specimen. If there is discrepancy, a third specimen is recommended. When 2 out of 3 results are in the microalbuminuria range, this is evidence for incipient nephropathy and warrants increased efforts at glucose control, blood pressure control, and institution of therapy with an angiotensin-converting-enzyme (ACE) inhibitor (if the patient can tolerate it).        Lab Results   Component Value Date    A1C 8.0 (H) 11/08/2023     Hypertension : Currently taking lisinopril 20 mg at bedtime, and hydrochlorothiazide 25 mg daily. Patient is also taking potassium  supplements. Said her blood pressure has not been high. She said the last time she checked her BP was normal. She tries to walk 5 thousand steps a day.   Patient reports no current medication side effects  Patient self monitors blood pressure.  Home BP monitoring sometimes .       BP Readings from Last 3 Encounters:   11/14/23 125/70   10/20/23 130/74   09/15/23 132/85     Pulse Readings from Last 3 Encounters:   11/14/23 62   10/20/23 62   09/15/23 83     Hyperlipidemia :Currently taking simvastatin 10 mg at bedtime   Patient reports no significant myalgias or other side effects.  The ASCVD Risk score (Robbie ABDALLA, et al., 2019) failed to calculate for the following reasons:    The 2019 ASCVD risk score is only valid for ages 40 to 79    Recent Labs   Lab Test 06/09/23  1140 08/03/22  0933   CHOL 152 165   HDL 73 75   LDL 64 75   TRIG 76 76     Today's Vitals: /70   Pulse 62   LMP  (LMP Unknown)     I spent 60 minutes with this patient today. All changes were made via collaborative practice agreement with Gomez Blevins MD. A copy of the visit note was provided to the patient's provider(s).    A summary of these recommendations was given to the patient.       Medication Therapy Recommendations  Controlled type 2 diabetes mellitus with stage 3 chronic kidney disease, without long-term current use of insulin (H)    Current Medication: empagliflozin (JARDIANCE) 10 MG TABS tablet (Discontinued)   Rationale: Unsafe medication for the patient - Adverse medication event - Safety   Recommendation: Discontinue Medication - Jardiance 10 MG Tabs   Status: Accepted per CPA          Current Medication: glipiZIDE (GLUCOTROL XL) 5 MG 24 hr tablet   Rationale: Untreated condition - Needs additional medication therapy - Indication   Recommendation: Start Medication - glipiZIDE 5 MG 24 hr tablet   Status: Accepted per CPA          Current Medication: metFORMIN (GLUCOPHAGE) 1000 MG tablet (Discontinued)   Rationale:  Unsafe medication for the patient - Adverse medication event - Safety   Recommendation: Discontinue Medication - metFORMIN 500 MG 24 hr tablet   Status: Accepted per CPA         Essential hypertension with goal blood pressure less than 140/90    Current Medication: hydrochlorothiazide (HYDRODIURIL) 25 MG tablet (Discontinued)   Rationale: Dose too high - Dosage too high - Safety   Recommendation: Decrease Dose - hydrochlorothiazide 12.5 MG tablet   Status: Accepted per CPA          Current Medication: lisinopril (ZESTRIL) 20 MG tablet (Discontinued)   Rationale: Dose too low - Dosage too low - Effectiveness   Recommendation: Increase Dose - lisinopril 40 MG tablet   Status: Accepted per CPA              Velma Medina, PharmD     Medication Therapy Management (MTM) Pharmacist     [phone]     [email or web address]

## 2023-11-13 NOTE — TELEPHONE ENCOUNTER
Dr. PARKINSON,    I agree it is a tough case. Metformin might not be appropriate any longer considering her renal function. If EGFR < 45 not appropriate to start metformin. We can try Onglyza if patient is okay with higher price. Widely believed that HCTZ in setting of EGFR < 30 is not recommended, but loop diuretics are (NKF KDOQI guidelines). Considering her situation and if she does not want to try Onglyza, Lantus or glipizide XL would help for now until her insurance situation is resolved.  What do you think Dr. PARKINSON. We are going back and forth on this case :)    Thanks,  Velma

## 2023-11-14 ENCOUNTER — LAB (OUTPATIENT)
Dept: LAB | Facility: CLINIC | Age: 83
End: 2023-11-14
Payer: COMMERCIAL

## 2023-11-14 ENCOUNTER — OFFICE VISIT (OUTPATIENT)
Dept: PHARMACY | Facility: CLINIC | Age: 83
End: 2023-11-14
Payer: COMMERCIAL

## 2023-11-14 ENCOUNTER — PATIENT OUTREACH (OUTPATIENT)
Dept: CARE COORDINATION | Facility: CLINIC | Age: 83
End: 2023-11-14

## 2023-11-14 VITALS — SYSTOLIC BLOOD PRESSURE: 125 MMHG | HEART RATE: 62 BPM | DIASTOLIC BLOOD PRESSURE: 70 MMHG

## 2023-11-14 DIAGNOSIS — I10 ESSENTIAL HYPERTENSION WITH GOAL BLOOD PRESSURE LESS THAN 140/90: ICD-10-CM

## 2023-11-14 DIAGNOSIS — E11.22 CONTROLLED TYPE 2 DIABETES MELLITUS WITH STAGE 3 CHRONIC KIDNEY DISEASE, WITHOUT LONG-TERM CURRENT USE OF INSULIN (H): ICD-10-CM

## 2023-11-14 DIAGNOSIS — N18.30 CONTROLLED TYPE 2 DIABETES MELLITUS WITH STAGE 3 CHRONIC KIDNEY DISEASE, WITHOUT LONG-TERM CURRENT USE OF INSULIN (H): Primary | ICD-10-CM

## 2023-11-14 DIAGNOSIS — E78.5 HYPERLIPIDEMIA LDL GOAL <160: ICD-10-CM

## 2023-11-14 DIAGNOSIS — N18.30 CONTROLLED TYPE 2 DIABETES MELLITUS WITH STAGE 3 CHRONIC KIDNEY DISEASE, WITHOUT LONG-TERM CURRENT USE OF INSULIN (H): ICD-10-CM

## 2023-11-14 DIAGNOSIS — E11.22 CONTROLLED TYPE 2 DIABETES MELLITUS WITH STAGE 3 CHRONIC KIDNEY DISEASE, WITHOUT LONG-TERM CURRENT USE OF INSULIN (H): Primary | ICD-10-CM

## 2023-11-14 LAB
ANION GAP SERPL CALCULATED.3IONS-SCNC: 14 MMOL/L (ref 7–15)
BUN SERPL-MCNC: 38.9 MG/DL (ref 8–23)
CALCIUM SERPL-MCNC: 9.9 MG/DL (ref 8.8–10.2)
CHLORIDE SERPL-SCNC: 100 MMOL/L (ref 98–107)
CREAT SERPL-MCNC: 1.72 MG/DL (ref 0.51–0.95)
DEPRECATED HCO3 PLAS-SCNC: 27 MMOL/L (ref 22–29)
EGFRCR SERPLBLD CKD-EPI 2021: 29 ML/MIN/1.73M2
GLUCOSE SERPL-MCNC: 159 MG/DL (ref 70–99)
POTASSIUM SERPL-SCNC: 3.6 MMOL/L (ref 3.4–5.3)
SODIUM SERPL-SCNC: 141 MMOL/L (ref 135–145)

## 2023-11-14 PROCEDURE — 80048 BASIC METABOLIC PNL TOTAL CA: CPT

## 2023-11-14 PROCEDURE — 99607 MTMS BY PHARM ADDL 15 MIN: CPT

## 2023-11-14 PROCEDURE — 99605 MTMS BY PHARM NP 15 MIN: CPT

## 2023-11-14 PROCEDURE — 36415 COLL VENOUS BLD VENIPUNCTURE: CPT

## 2023-11-14 RX ORDER — LISINOPRIL 40 MG/1
40 TABLET ORAL DAILY
Qty: 90 TABLET | Refills: 4 | Status: SHIPPED | OUTPATIENT
Start: 2023-11-14

## 2023-11-14 RX ORDER — GLIPIZIDE 5 MG/1
5 TABLET, FILM COATED, EXTENDED RELEASE ORAL DAILY
Qty: 90 TABLET | Refills: 1 | Status: SHIPPED | OUTPATIENT
Start: 2023-11-14 | End: 2024-05-03

## 2023-11-14 RX ORDER — HYDROCHLOROTHIAZIDE 12.5 MG/1
12.5 TABLET ORAL DAILY
Qty: 90 TABLET | Refills: 4 | Status: SHIPPED | OUTPATIENT
Start: 2023-11-14 | End: 2024-01-24

## 2023-11-14 NOTE — LETTER
"Recommended To-Do List      Prepared on: 11/15/2023       You can get the best results from your medications by completing the items on this \"To-Do List.\"      Bring your To-Do List when you go to your doctor. And, share it with your family or caregivers.    My To-Do List:  What we talked about: What I should do:   An issue with your medication    Stop taking empagliflozin (Jardiance)          What we talked about: What I should do:   A new medication that may be of benefit to you    Start taking glipiZIDE (GLUCOTROL XL) 5 mg daily           What we talked about: What I should do:   An issue with your medication    Stop taking metFORMIN (GLUCOPHAGE)          What we talked about: What I should do:   Your medication dosage being too high    Decrease your dosage of hydrochlorothiazide (HYDRODIURIL) to 12.5 mg          What we talked about: What I should do:   Your medication dosage being too low    Increase your dosage of lisinopril (ZESTRIL) to 40 mg daily           What we talked about: What I should do:                       "

## 2023-11-14 NOTE — PROGRESS NOTES
[facility]/Voicemail    Clinical Data: Care Coordinator Outreach    Outreach Documentation Number of Outreach Attempt   11/14/2023   2:40 PM 1     Patient left a VM asking for a call back. Unclear what needs she has.  No order for care coordination placed.     Left message on patient's voicemail with call back information and requested return call.    Plan-Care Coordinator will try to reach patient again in 3-5 business days.    VM from patient who was not sure why she called, but guesses it was about paying for her medication.  Her kids are helping with that so she doesn't need anything from care coordination team.      Plan- no further outreach.     Marsha Urban,   [facility]  [phone]

## 2023-11-14 NOTE — PATIENT INSTRUCTIONS
"Recommendations from today's MTM visit:                                                      MTM (medication therapy management) is a service provided by a clinical pharmacist designed to help you get the most of out of your medicines.   Today we reviewed what your medicines are for, how to know if they are working, that your medicines are safe and how to make your medicine regimen as easy as possible.      Stop taking metformin, Jardiance and potassium supplements  Start checking your sugar readings in the morning before eating breakfast, and also check when you feel low sugar symptoms like dizziness, shakiness and irritability   Start taking glipizide 5 mg XL in the morning for now and considering how your sugar is we will increase it to twice daily   Increase lisinopril dose to 40 mg for maximum kidney protection   Decrease hydrochlorothiazide dose from 25 mg to 12.5 mg   Considering checking your blood pressure at least once daily       Follow-up: Due on 12/05/2023    It was great speaking with you today.  I value your experience and would be very thankful for your time in providing feedback in our clinic survey. In the next few days, you may receive an email or text message from [facility] [employer] with a link to a survey related to your  clinical pharmacist.\"     To schedule another MTM appointment, please call the clinic directly or you may call the MTM scheduling line at [phone] or toll-free at [phone].     My Clinical Pharmacist's contact information:                                                      Please feel free to contact me with any questions or concerns you have.        Velma Medina, PharmD     Medication Therapy Management (MTM) Pharmacist     [phone]     [email or web address]     [facility]    "

## 2023-11-14 NOTE — LETTER
_  Medication List        Prepared on: 11/15/2023     Bring your Medication List when you go to the doctor, hospital, or   emergency room. And, share it with your family or caregivers.     Note any changes to how you take your medications.  Cross out medications when you no longer use them.    Medication How I take it Why I use it Prescriber   alcohol swab prep pads Use to swab area of injection/katharine as directed. Controlled type 2 diabetes mellitus with stage 3 chronic kidney disease, without long-term current use of insulin (H) Dianna Lucero MD   blood glucose (ACCU-CHEK FASTCLIX) lancing device Lancing device to be used with lancets. Controlled type 2 diabetes mellitus with stage 3 chronic kidney disease, without long-term current use of insulin (H) Dianna Lucero MD   blood glucose (NO BRAND SPECIFIED) test strip Use to test blood sugar 2 times daily or as directed. Controlled type 2 diabetes mellitus with stage 3 chronic kidney disease, without long-term current use of insulin (H) Gomez Blevins MD   blood glucose monitoring (ACCU-CHEK FASTCLIX) lancets Use to test blood sugar 2 times daily before meals Controlled type 2 diabetes mellitus with stage 3 chronic kidney disease, without long-term current use of insulin (H) Dianna Lucero MD   blood glucose monitoring (NO BRAND SPECIFIED) meter device kit Use to test blood sugar 2 times daily before meals or as directed. Controlled type 2 diabetes mellitus with stage 3 chronic kidney disease, without long-term current use of insulin (H) Dianna Lucero MD   ferrous sulfate 325 (65 FE) MG tablet Take 1 tablet by mouth daily (with breakfast)   Anemia  Historical Provider   glipiZIDE (GLUCOTROL XL) 5 MG 24 hr tablet Take 1 tablet (5 mg) by mouth daily Controlled type 2 diabetes mellitus with stage 3 chronic kidney disease, without long-term current use of insulin (H) Gomez Blevins MD   hydrochlorothiazide (HYDRODIURIL) 12.5 MG tablet Take 1  tablet (12.5 mg) by mouth daily Essential hypertension with goal blood pressure less than 140/90 Gomez Blevins MD   lisinopril (ZESTRIL) 40 MG tablet Take 1 tablet (40 mg) by mouth daily Essential hypertension with goal blood pressure less than 140/90 Gomez Blevins MD   Multiple Vitamins-Minerals (MULTIVITAMIN ADULTS PO) Take by mouth daily  Preventative Medicine Patient Reported   simvastatin (ZOCOR) 10 MG tablet TAKE 1 TABLET AT BEDTIME Controlled type 2 diabetes mellitus with stage 3 chronic kidney disease, without long-term current use of insulin (H) Gomez Blevins MD         Add new medications, over-the-counter drugs, herbals, vitamins, or  minerals in the blank rows below.    Medication How I take it Why I use it Prescriber                                      Allergies:      penicillins        Side effects I have had:               Other Information:              My notes and questions:

## 2023-11-14 NOTE — LETTER
November 15, 2023  Berta Witt  [address]  [place] [address]    Dear Ms. Witt, [facility]     Thank you for talking with me on Nov 14, 2023 about your health and medications. As a follow-up to our conversation, I have included two documents:      Your Recommended To-Do List has steps you should take to get the best results from your medications.  Your Medication List will help you keep track of your medications and how to take them.    If you want to talk about these documents, please call MAXIMINO POWELL RPH at phone: [phone], Monday-Friday 8-4:30pm.    I look forward to working with you and your doctors to make sure your medications work well for you.    Sincerely,  MAXIMINO POWELL RPH  [facility] Pharmacist, [facility]

## 2023-11-15 DIAGNOSIS — E11.22 CONTROLLED TYPE 2 DIABETES MELLITUS WITH STAGE 3 CHRONIC KIDNEY DISEASE, WITHOUT LONG-TERM CURRENT USE OF INSULIN (H): ICD-10-CM

## 2023-11-15 DIAGNOSIS — N18.30 CONTROLLED TYPE 2 DIABETES MELLITUS WITH STAGE 3 CHRONIC KIDNEY DISEASE, WITHOUT LONG-TERM CURRENT USE OF INSULIN (H): ICD-10-CM

## 2023-11-16 RX ORDER — SIMVASTATIN 10 MG
TABLET ORAL
Qty: 90 TABLET | Refills: 1 | Status: SHIPPED | OUTPATIENT
Start: 2023-11-16

## 2023-11-17 ENCOUNTER — TELEPHONE (OUTPATIENT)
Dept: INTERNAL MEDICINE | Facility: CLINIC | Age: 83
End: 2023-11-17
Payer: COMMERCIAL

## 2023-11-17 NOTE — TELEPHONE ENCOUNTER
Please let patient know recent lab work results, potassium level is good, kidney function is better.    She should follow-up again in a month for repeat blood work and see how she is doing with numerous recent medication changes.

## 2023-11-30 ENCOUNTER — LAB (OUTPATIENT)
Dept: LAB | Facility: CLINIC | Age: 83
End: 2023-11-30
Payer: COMMERCIAL

## 2023-11-30 ENCOUNTER — TELEPHONE (OUTPATIENT)
Dept: INTERNAL MEDICINE | Facility: CLINIC | Age: 83
End: 2023-11-30

## 2023-11-30 DIAGNOSIS — E87.6 HYPOKALEMIA: Primary | ICD-10-CM

## 2023-11-30 DIAGNOSIS — N18.32 STAGE 3B CHRONIC KIDNEY DISEASE (H): ICD-10-CM

## 2023-11-30 LAB
ANION GAP SERPL CALCULATED.3IONS-SCNC: 13 MMOL/L (ref 7–15)
BUN SERPL-MCNC: 32.2 MG/DL (ref 8–23)
CALCIUM SERPL-MCNC: 10 MG/DL (ref 8.8–10.2)
CHLORIDE SERPL-SCNC: 103 MMOL/L (ref 98–107)
CREAT SERPL-MCNC: 1.5 MG/DL (ref 0.51–0.95)
DEPRECATED HCO3 PLAS-SCNC: 28 MMOL/L (ref 22–29)
EGFRCR SERPLBLD CKD-EPI 2021: 34 ML/MIN/1.73M2
GLUCOSE SERPL-MCNC: 109 MG/DL (ref 70–99)
POTASSIUM SERPL-SCNC: 3.3 MMOL/L (ref 3.4–5.3)
SODIUM SERPL-SCNC: 144 MMOL/L (ref 135–145)

## 2023-11-30 PROCEDURE — 36415 COLL VENOUS BLD VENIPUNCTURE: CPT

## 2023-11-30 PROCEDURE — 80048 BASIC METABOLIC PNL TOTAL CA: CPT

## 2023-12-01 RX ORDER — POTASSIUM CHLORIDE 1500 MG/1
20 TABLET, EXTENDED RELEASE ORAL DAILY
Start: 2023-12-01

## 2023-12-01 NOTE — TELEPHONE ENCOUNTER
Let pt know results:  Kidney function is improving. Potassium is slightly below normal.    Please confirm she is taking Lisinopril 40 mg and hydrochlorothiazide 12.5 mg. If yes, then I would recommend going back on potassium supplement 20 MEQ a day and repeating labs in 2 weeks. (Please pend, she may have some potassium still left at home).    How are her sugars on glipizide?

## 2023-12-01 NOTE — TELEPHONE ENCOUNTER
"Patient returned call and writer relayed below information per Dr Lucero.     Patient confirms that she is taking both medications: lisinopril and hydrochlorothiazide. Due to this, she will resuming taking the potassium and does report she has enough supply at home to not need a new prescription.    When asked about blood sugar readings, patient reports that she had a death in the family that required urgent travel out of state and therefore \"I haven't really done much at all when it comes to  that\". Writer encouraged patient to start tracking blood sugar readings so that we can report this information to  Dr Lucero. Patient reports understanding and does not have any further questions or concerns at this time.  "

## 2023-12-05 ENCOUNTER — OFFICE VISIT (OUTPATIENT)
Dept: PHARMACY | Facility: CLINIC | Age: 83
End: 2023-12-05
Payer: COMMERCIAL

## 2023-12-05 VITALS — DIASTOLIC BLOOD PRESSURE: 75 MMHG | SYSTOLIC BLOOD PRESSURE: 137 MMHG | HEART RATE: 65 BPM

## 2023-12-05 DIAGNOSIS — E78.5 HYPERLIPIDEMIA LDL GOAL <160: ICD-10-CM

## 2023-12-05 DIAGNOSIS — E11.22 CONTROLLED TYPE 2 DIABETES MELLITUS WITH STAGE 3 CHRONIC KIDNEY DISEASE, WITHOUT LONG-TERM CURRENT USE OF INSULIN (H): Primary | ICD-10-CM

## 2023-12-05 DIAGNOSIS — I10 ESSENTIAL HYPERTENSION WITH GOAL BLOOD PRESSURE LESS THAN 140/90: ICD-10-CM

## 2023-12-05 DIAGNOSIS — N18.30 CONTROLLED TYPE 2 DIABETES MELLITUS WITH STAGE 3 CHRONIC KIDNEY DISEASE, WITHOUT LONG-TERM CURRENT USE OF INSULIN (H): Primary | ICD-10-CM

## 2023-12-05 PROCEDURE — 99607 MTMS BY PHARM ADDL 15 MIN: CPT

## 2023-12-05 PROCEDURE — 99606 MTMS BY PHARM EST 15 MIN: CPT

## 2023-12-05 NOTE — TELEPHONE ENCOUNTER
General Call    Contacts         Type Contact Phone/Fax    12/05/2023 12:15 PM CST Phone (Incoming) Berta Witt (Self) [phone] (M)          Reason for Call:  glucose monitoring device-Dr Lucero please.    What are your questions or concerns:  Pt is wondering if she could get a glucose monitoring device as she is trouble with the finger sticks.    She is also wondering if Dr Lucero would be her PCP as she has been seeing her for 1 year at least.    Please let pt know.    Date of last appointment with provider: 12/5/23    Could we send this information to you in [employer] or would you prefer to receive a phone call?:   Patient would prefer a phone call   Okay to leave a detailed message?: Yes at Cell number on file:    Telephone Information:   Mobile [phone]

## 2023-12-05 NOTE — PATIENT INSTRUCTIONS
"Recommendations from today's MTM visit:                                                      MTM (medication therapy management) is a service provided by a clinical pharmacist designed to help you get the most of out of your medicines.   Today we reviewed what your medicines are for, how to know if they are working, that your medicines are safe and how to make your medicine regimen as easy as possible.      PharmD to order BMP for patient to do blood work on 12/15/2023  Please bring your sugar readings and blood pressure readings on the next appointment     Follow-up: Due on 12/19/2023    It was great speaking with you today.  I value your experience and would be very thankful for your time in providing feedback in our clinic survey. In the next few days, you may receive an email or text message from [facility] [employer] with a link to a survey related to your  clinical pharmacist.\"     To schedule another MTM appointment, please call the clinic directly or you may call the MTM scheduling line at [phone] or toll-free at [phone].     My Clinical Pharmacist's contact information:                                                      Please feel free to contact me with any questions or concerns you have.        Velma Medina, PharmD     Medication Therapy Management (MTM) Pharmacist     [phone]     [email or web address]     [facility]    "

## 2023-12-05 NOTE — PROGRESS NOTES
Medication Therapy Management (MTM) Encounter    ASSESSMENT:                            Medication Adherence/Access: No issues identified.She has a container that she puts her medications for the next day. She got a pillbox, but has not used it yet.     Diabetes Type II:  A1C slightly within goal of < 8. Patient only takes glipizide XL 5 mg daily. No home sugar readings at this time and will continue current medications. Advised patient to check BG at least once in the morning.    Hypertension: In clinic blood pressure within goal of < 130/80. No home BP at this time and will follow up in the next appointment. Advised patient to check her blood pressure.     Hyperlipidemia: Stable on simvastatin 10 mg daily    PLAN:                            PharmD to order BMP for patient to do blood work on 12/15/2023  Please bring your sugar readings and blood pressure readings on the next appointment     Follow-up: Due on 12/19/2023    SUBJECTIVE/OBJECTIVE:                          Berta Witt is a 83 year old female seen for an initial visit. She was referred to me from Dr. Lucero.      Reason for visit: Medication review and Diabetes.    Allergies/ADRs: Reviewed in chart  Past Medical History: Reviewed in chart  Tobacco: She reports that she has never smoked. She has never used smokeless tobacco.    Medication Adherence/Access: She has a container that she puts her medications for the next day. She got a pillbox, but has not used it yet.     Diabetes  Type II: Currently not taking metformin and Jardiance due to low renal function.She is also not taking Januvia.  Patient does not like injecting insulin. Currently she is taking glipizide XL 5 mg.   Not taking aspirin due age  Blood sugar monitoring: never  Current diabetes symptoms: none  Diet/Exercise: She said she eats healthy; she does not eat a lot of proteins and she eats a lot fruits and veggies. She said she is really conscious of what she eats. He never had a sweat  stuff, but ate once at  [employer]. If she has sweats at home she would eat them though. She adds honey in her coffee in the morning. Patient will be going to [place] for the rest of November.   Eye exam is up to date  Foot exam: due  Urine Albumin:   Lab Results   Component Value Date    UMALCR  02/08/2023      Comment:      Unable to calculate, urine albumin and/or urine creatinine is outside detectable limits.  Microalbuminuria is defined as an albumin:creatinine ratio of 17 to 299 for males and 25 to 299 for females. A ratio of albumin:creatinine of 300 or higher is indicative of overt proteinuria.  Due to biologic variability, positive results should be confirmed by a second, first-morning random or 24-hour timed urine specimen. If there is discrepancy, a third specimen is recommended. When 2 out of 3 results are in the microalbuminuria range, this is evidence for incipient nephropathy and warrants increased efforts at glucose control, blood pressure control, and institution of therapy with an angiotensin-converting-enzyme (ACE) inhibitor (if the patient can tolerate it).        Lab Results   Component Value Date    A1C 8.0 (H) 11/08/2023     - Patient has not been checking her blood sugar lately. The last time she checked her sugar readings was when she was in [place], and her 2hPP was 179. She does not check her sugar in the morning because she wakes up and keeps going. She said she will be checking her fasting sugar readings in the morning.   - Patient forget her supplies for glucose and blood pressure in [place], but her medications were on her carry on bag. Her supplies are getting shipped to [place].       Hypertension : Currently taking lisinopril 20 mg at bedtime, and hydrochlorothiazide 12.5 mg daily. Patient is also taking potassium supplements. Said her blood pressure has not been high. She tries to walk 5 thousand steps a day.   Patient reports no current medication side effects  Patient self monitors blood  pressure.  Home BP monitoring sometimes .       BP Readings from Last 3 Encounters:   12/05/23 137/75   11/14/23 125/70   10/20/23 130/74     Pulse Readings from Last 3 Encounters:   12/05/23 65   11/14/23 62   10/20/23 62     - Patient forgot her BP cuff in [facility] and did not have any sugar readings to report today.      I spent 50 minutes with this patient today. All changes were made via collaborative practice agreement with Gomez Blevins MD. A copy of the visit note was provided to the patient's provider(s).    A summary of these recommendations was given to the patient.       Medication Therapy Recommendations  No medication therapy recommendations to display     Velma Medina, PharmD     Medication Therapy Management (MTM) Pharmacist     [phone]     [email or web address]     [facility]

## 2023-12-05 NOTE — PROGRESS NOTES
Carter Lucero,  This patient is on lab schedule for 12/15 and may need lab orders. Please advise / place orders.    Thanks.

## 2023-12-06 NOTE — TELEPHONE ENCOUNTER
Please let Berta know,     I can be her PCP if she would like.    Since she is not on insulin, medicare may not cover glucose monitoring device, but if she would like, she could pay for it out of pocket: 2 sensors which last 14 days each would cost about $50 a months and if she has a smart phone, it can be uses as a reader (Velma can help set it up for her when she sees him next) or I can order a reader as well.  If she is agreeable, let me know and I will put the order through.    She can also call her  insurance to find out if a certain glucose monitor may be covered (Esvin vs Dexcom)

## 2023-12-06 NOTE — TELEPHONE ENCOUNTER
Pt adv per Dr Lucero and pt said she will call back  And let someone know which on she would like to get  That will ba cover by her insurance

## 2023-12-11 NOTE — TELEPHONE ENCOUNTER
Patient Returning Call    Reason for call:  return call    Information relayed to patient:  Patient states insurance will cover 20% of the cost of the system. States that they will cover the Dexcom G6 or a Esvin system (no specific Esvin system)  Patient has additional questions regarding each sensor and the differences, etc    Patient has additional questions:  Yes    What are your questions/concerns:  see above, questions about specific sensors    Who does the patient want to speak with:   any    Is an  needed?:  No      Could we send this information to you in [employer] or would you prefer to receive a phone call?:   Patient would prefer a phone call   Okay to leave a detailed message?: Yes at Home number on file [phone] (home)

## 2023-12-12 RX ORDER — PROCHLORPERAZINE 25 MG/1
SUPPOSITORY RECTAL
Qty: 3 EACH | Refills: 5 | Status: SHIPPED | OUTPATIENT
Start: 2023-12-12 | End: 2024-05-30

## 2023-12-12 NOTE — TELEPHONE ENCOUNTER
Spoke with patient and she would like to have both the dexcom G6 and marta sensors sent to her [facility] pharmacy. She will fill whichever one is cheaper. She plans to discuss the application set up with Velma at her 12/19 appointment.    Pt did not have any further questions or concerns at the time of this phone call.

## 2023-12-15 ENCOUNTER — LAB (OUTPATIENT)
Dept: LAB | Facility: CLINIC | Age: 83
End: 2023-12-15
Payer: COMMERCIAL

## 2023-12-15 DIAGNOSIS — E11.22 CONTROLLED TYPE 2 DIABETES MELLITUS WITH STAGE 3 CHRONIC KIDNEY DISEASE, WITHOUT LONG-TERM CURRENT USE OF INSULIN (H): ICD-10-CM

## 2023-12-15 DIAGNOSIS — I10 ESSENTIAL HYPERTENSION WITH GOAL BLOOD PRESSURE LESS THAN 140/90: ICD-10-CM

## 2023-12-15 DIAGNOSIS — N18.30 CONTROLLED TYPE 2 DIABETES MELLITUS WITH STAGE 3 CHRONIC KIDNEY DISEASE, WITHOUT LONG-TERM CURRENT USE OF INSULIN (H): ICD-10-CM

## 2023-12-15 LAB
ANION GAP SERPL CALCULATED.3IONS-SCNC: 10 MMOL/L (ref 7–15)
BUN SERPL-MCNC: 32.3 MG/DL (ref 8–23)
CALCIUM SERPL-MCNC: 9.7 MG/DL (ref 8.8–10.2)
CHLORIDE SERPL-SCNC: 107 MMOL/L (ref 98–107)
CREAT SERPL-MCNC: 1.6 MG/DL (ref 0.51–0.95)
DEPRECATED HCO3 PLAS-SCNC: 26 MMOL/L (ref 22–29)
EGFRCR SERPLBLD CKD-EPI 2021: 32 ML/MIN/1.73M2
GLUCOSE SERPL-MCNC: 112 MG/DL (ref 70–99)
POTASSIUM SERPL-SCNC: 4 MMOL/L (ref 3.4–5.3)
SODIUM SERPL-SCNC: 143 MMOL/L (ref 135–145)

## 2023-12-15 PROCEDURE — 36415 COLL VENOUS BLD VENIPUNCTURE: CPT

## 2023-12-15 PROCEDURE — 80048 BASIC METABOLIC PNL TOTAL CA: CPT

## 2023-12-19 ENCOUNTER — OFFICE VISIT (OUTPATIENT)
Dept: PHARMACY | Facility: CLINIC | Age: 83
End: 2023-12-19
Payer: COMMERCIAL

## 2023-12-19 VITALS — HEART RATE: 64 BPM | SYSTOLIC BLOOD PRESSURE: 130 MMHG | DIASTOLIC BLOOD PRESSURE: 78 MMHG

## 2023-12-19 DIAGNOSIS — N18.30 CONTROLLED TYPE 2 DIABETES MELLITUS WITH STAGE 3 CHRONIC KIDNEY DISEASE, WITHOUT LONG-TERM CURRENT USE OF INSULIN (H): Primary | ICD-10-CM

## 2023-12-19 DIAGNOSIS — E11.22 CONTROLLED TYPE 2 DIABETES MELLITUS WITH STAGE 3 CHRONIC KIDNEY DISEASE, WITHOUT LONG-TERM CURRENT USE OF INSULIN (H): Primary | ICD-10-CM

## 2023-12-19 PROCEDURE — 99207 PR NO CHARGE LOS: CPT

## 2023-12-19 ASSESSMENT — PAIN SCALES - GENERAL: PAINLEVEL: NO PAIN (0)

## 2023-12-19 NOTE — PATIENT INSTRUCTIONS
"Recommendations from today's MTM visit:                                                      MTM (medication therapy management) is a service provided by a clinical pharmacist designed to help you get the most of out of your medicines.   Today we reviewed what your medicines are for, how to know if they are working, that your medicines are safe and how to make your medicine regimen as easy as possible.      1. PharmD to place marta 2 sensor on patient's arm  2. Please check her sugar readings at least three times a day   3. PharmD to connect patient marta account with clinic practice account   4. Patient to schedule an appointment with writer in about two weeks    Follow-up: When needed by patient in about two weeks    It was great speaking with you today.  I value your experience and would be very thankful for your time in providing feedback in our clinic survey. In the next few days, you may receive an email or text message from [employer] with a link to a survey related to your  clinical pharmacist.\"     To schedule another MTM appointment, please call the clinic directly or you may call the MTM scheduling line at [phone] or toll-free at [phone].     My Clinical Pharmacist's contact information:                                                      Please feel free to contact me with any questions or concerns you have.        Velma Medina, DaquanD     Medication Therapy Management (MTM) Pharmacist     [phone]     [email or web address]     [facility]    "

## 2023-12-19 NOTE — PROGRESS NOTES
Clinical Pharmacy Consult:                                                    Berta Witt is a 83 year old female seen for a clinical pharmacist consult.  She was referred to me from Dr. Blevins.     Reason for Consult: CGM placement and Education     Discussion: Patient come in today to get help with placing CGM sensor (marta 2). Writer showed patient how to place the sensor on her arms, and how to scan it. We also discussed about the need to check her BG at least three times a day. Educated patient about the need to change the sensor every 14 days. Writer connected patient marta account with clinic account and this will help us do telephone follow up in the future.     Plan:  1. PharmD to place marta 2 sensor on patient's arm  2. Please check her sugar readings at least three times a day   3. PharmD to connect patient marta account with clinic practice account   4. Patient to schedule an appointment with writer in about two weeks         Velma Medina, PharmD     Medication Therapy Management (MTM) Pharmacist     [phone]     [email or web address]     [facility]

## 2024-01-03 ENCOUNTER — OFFICE VISIT (OUTPATIENT)
Dept: PHARMACY | Facility: CLINIC | Age: 84
End: 2024-01-03
Payer: COMMERCIAL

## 2024-01-03 VITALS — DIASTOLIC BLOOD PRESSURE: 72 MMHG | SYSTOLIC BLOOD PRESSURE: 137 MMHG | HEART RATE: 68 BPM

## 2024-01-03 DIAGNOSIS — N18.30 CONTROLLED TYPE 2 DIABETES MELLITUS WITH STAGE 3 CHRONIC KIDNEY DISEASE, WITHOUT LONG-TERM CURRENT USE OF INSULIN (H): Primary | ICD-10-CM

## 2024-01-03 DIAGNOSIS — E11.22 CONTROLLED TYPE 2 DIABETES MELLITUS WITH STAGE 3 CHRONIC KIDNEY DISEASE, WITHOUT LONG-TERM CURRENT USE OF INSULIN (H): Primary | ICD-10-CM

## 2024-01-03 DIAGNOSIS — Z78.9 TAKES DIETARY SUPPLEMENTS: ICD-10-CM

## 2024-01-03 DIAGNOSIS — E78.5 HYPERLIPIDEMIA LDL GOAL <160: ICD-10-CM

## 2024-01-03 DIAGNOSIS — D64.9 ANEMIA, UNSPECIFIED TYPE: ICD-10-CM

## 2024-01-03 DIAGNOSIS — I10 ESSENTIAL HYPERTENSION WITH GOAL BLOOD PRESSURE LESS THAN 140/90: ICD-10-CM

## 2024-01-03 PROCEDURE — 99607 MTMS BY PHARM ADDL 15 MIN: CPT

## 2024-01-03 PROCEDURE — 99605 MTMS BY PHARM NP 15 MIN: CPT

## 2024-01-03 NOTE — LETTER
January 3, 2024  Berta Witt  [address]   [place] [address]    Dear Ms. Witt, JOAQUIN [facility]     Thank you for talking with me on Brigido 3, 2024 about your health and medications. As a follow-up to our conversation, I have included two documents:      Your Recommended To-Do List has steps you should take to get the best results from your medications.  Your Medication List will help you keep track of your medications and how to take them.    If you want to talk about these documents, please call MAXIMINO POWELL RPH at phone: [phone], Monday-Friday 8-4:30pm.    I look forward to working with you and your doctors to make sure your medications work well for you.    Sincerely,  MAXIMINO POWELL RPH  [facility] Pharmacist, [facility]

## 2024-01-03 NOTE — LETTER
"Recommended To-Do List      Prepared on: 01/03/2024       You can get the best results from your medications by completing the items on this \"To-Do List.\"      Bring your To-Do List when you go to your doctor. And, share it with your family or caregivers.    My To-Do List:  What we talked about: What I should do:    What my medicines are for, how to know if my medicines are working, made sure my medicines are safe for me and reviewed how to take my medicines.     Take my medicines every day               "

## 2024-01-03 NOTE — PATIENT INSTRUCTIONS
"Recommendations from today's MTM visit:                                                      MTM (medication therapy management) is a service provided by a clinical pharmacist designed to help you get the most of out of your medicines.   Today we reviewed what your medicines are for, how to know if they are working, that your medicines are safe and how to make your medicine regimen as easy as possible.      PharmD to put marta sensor on patient's arms   Continue taking the current medications as prescribed   Please check your blood pressure at least three times a week    Follow-up: Due when needed by patient     It was great speaking with you today.  I value your experience and would be very thankful for your time in providing feedback in our clinic survey. In the next few days, you may receive an email or text message from [facility] [employer] with a link to a survey related to your  clinical pharmacist.\"     To schedule another MTM appointment, please call the clinic directly or you may call the MTM scheduling line at [phone] or toll-free at [phone].     My Clinical Pharmacist's contact information:                                                      Please feel free to contact me with any questions or concerns you have.        Velam Medina, PharmD     Medication Therapy Management (MTM) Pharmacist     [phone]     [email or web address]     [facility]    "

## 2024-01-03 NOTE — PROGRESS NOTES
Medication Therapy Management (MTM) Encounter    ASSESSMENT:                            Medication Adherence/Access: No issues identified.She has a container that she puts her medications for the next day. She got a pillbox, but has not used it yet.     Diabetes Type II:  A1C slightly within goal of < 8. Most of the CGM readings are within the target range (70 - 180) 67%. She is only on glipizide XL 5 mg daily. Will continue current medications as prescribed. Advised patient to eat more healthy food.     Hypertension: In clinic BP mostly above goal of < 130/80. Advised patient to check her BP and report to writer. Will consider either the current medications or add a new medication. We can discontinue hydrochlorothiazide and start amlodipine.     Hyperlipidemia: Stable on simvastatin 10 mg daily    Anemia: Stable on iron supplement and Hg is within goal     Dietary supplement: Continue taking supplements     PLAN:                          PharmD to put marta sensor on patient's arms   Continue taking the current medications as prescribed   Please check your blood pressure at least three times a week    Follow-up: Due when needed by patient     SUBJECTIVE/OBJECTIVE:                          Berta Witt is a 83 year old female seen for an initial visit. She was referred to me from Dr. Lucero.      Reason for visit: Medication review and Diabetes.    Allergies/ADRs: Reviewed in chart  Past Medical History: Reviewed in chart  Tobacco: She reports that she has never smoked. She has never used smokeless tobacco.    Medication Adherence/Access: She has a container that she puts her medications for the next day. She has a pillbox, but has not used it yet.      Diabetes  Type II: Currently not taking metformin and Jardiance due to low renal function.She is also not taking Januvia.  Patient does not like injecting insulin. Currently she is only taking glipizide XL 5 mg daily.   Not taking aspirin due age  Blood sugar  monitoring: never  Current diabetes symptoms: none  Diet/Exercise: She said she eats healthy; she does not eat a lot of proteins and she eats a lot fruits and veggies. She said she is really conscious of what she eats. She never had a sweat stuff, but ate once at  [employer]. If she has sweats at home she would eat them though. She adds honey in her coffee in the morning, but not anymore. Noted she was eating a little bit more sugary food during Christmas and new year.      Eye exam is up to date  Foot exam: due  Urine Albumin:   Lab Results   Component Value Date    UMALCR  02/08/2023      Comment:      Unable to calculate, urine albumin and/or urine creatinine is outside detectable limits.  Microalbuminuria is defined as an albumin:creatinine ratio of 17 to 299 for males and 25 to 299 for females. A ratio of albumin:creatinine of 300 or higher is indicative of overt proteinuria.  Due to biologic variability, positive results should be confirmed by a second, first-morning random or 24-hour timed urine specimen. If there is discrepancy, a third specimen is recommended. When 2 out of 3 results are in the microalbuminuria range, this is evidence for incipient nephropathy and warrants increased efforts at glucose control, blood pressure control, and institution of therapy with an angiotensin-converting-enzyme (ACE) inhibitor (if the patient can tolerate it).        Lab Results   Component Value Date    A1C 8.0 (H) 11/08/2023           Hypertension : Currently taking lisinopril 40 mg at bedtime, and hydrochlorothiazide 12.5 mg daily. Patient is also taking potassium supplements. Said her blood pressure has not been high. She said the last time she checked her BP was normal. She tries to walk 5 thousand steps a day.   Patient reports no current medication side effects  Patient self monitors blood pressure.  Home BP monitoring sometimes . She has been checking her BP readings, but forgot to bring the readings today. BP  readings are a bit high these days, and we should consider adding a new med or adjust the medication the patient is taking.     BP Readings from Last 3 Encounters:   01/03/24 137/72   12/19/23 130/78   12/05/23 137/75     Pulse Readings from Last 3 Encounters:   01/03/24 68   12/19/23 64   12/05/23 65     In clinic BP: 139/73; pulse: 65      Hyperlipidemia :Currently taking simvastatin 10 mg at bedtime   Patient reports no significant myalgias or other side effects.  The ASCVD Risk score (Robbie DK, et al., 2019) failed to calculate for the following reasons:    The 2019 ASCVD risk score is only valid for ages 40 to 79    Recent Labs   Lab Test 06/09/23  1140 08/03/22  0933   CHOL 152 165   HDL 73 75   LDL 64 75   TRIG 76 76     Anemia: Currently she takes ferrous sulfate 325 mg daily with breakfast. Patient reported that she has a good bowel movement.                    Hemoglobin   Date Value Ref Range Status   09/15/2023 13.6 11.7 - 15.7 g/dL Final     Dietary Supplement: Currently taking multivitamins and minerals and she takes one tablet daily.       Today's Vitals: /72   Pulse 68   LMP  (LMP Unknown)   ----------------      I spent 30 minutes with this patient today. All changes were made via collaborative practice agreement with Gomez Blevins MD. A copy of the visit note was provided to the patient's provider(s).    A summary of these recommendations was given to the patient.     Medication Therapy Recommendations  No medication therapy recommendations to display     Velma Medina, PharmD     Medication Therapy Management (MTM) Pharmacist     [phone]     [email or web address]

## 2024-01-24 ENCOUNTER — OFFICE VISIT (OUTPATIENT)
Dept: PHARMACY | Facility: CLINIC | Age: 84
End: 2024-01-24
Payer: COMMERCIAL

## 2024-01-24 VITALS — SYSTOLIC BLOOD PRESSURE: 149 MMHG | DIASTOLIC BLOOD PRESSURE: 74 MMHG | HEART RATE: 71 BPM

## 2024-01-24 DIAGNOSIS — N18.30 CONTROLLED TYPE 2 DIABETES MELLITUS WITH STAGE 3 CHRONIC KIDNEY DISEASE, WITHOUT LONG-TERM CURRENT USE OF INSULIN (H): Primary | ICD-10-CM

## 2024-01-24 DIAGNOSIS — E11.22 CONTROLLED TYPE 2 DIABETES MELLITUS WITH STAGE 3 CHRONIC KIDNEY DISEASE, WITHOUT LONG-TERM CURRENT USE OF INSULIN (H): Primary | ICD-10-CM

## 2024-01-24 DIAGNOSIS — I10 ESSENTIAL HYPERTENSION WITH GOAL BLOOD PRESSURE LESS THAN 140/90: ICD-10-CM

## 2024-01-24 PROCEDURE — 99207 PR NO CHARGE LOS: CPT

## 2024-01-24 RX ORDER — HYDROCHLOROTHIAZIDE 12.5 MG/1
25 TABLET ORAL DAILY
Qty: 180 TABLET | Refills: 4 | Status: SHIPPED | OUTPATIENT
Start: 2024-01-24 | End: 2024-04-25

## 2024-01-24 ASSESSMENT — PAIN SCALES - GENERAL: PAINLEVEL: NO PAIN (0)

## 2024-01-24 NOTE — PROGRESS NOTES
Clinical Pharmacy Consult:                                                    Berta Witt is a 83 year old female seen for a clinical pharmacist consult.  She was referred to me from Dr. Blevins.     Reason for Consult:      Discussion: Patient is here to put on marta sensor 2. PharmD observed the process and patient did put the sensor correctly. She is ready to put it herself at home.    Plan:  1. PharmD to watch patient put on marta sensor and assist if needed   2. Please bring your BP cuff with you on our next appointment   3. Please increase hydrochlorothiazide dose from 12.5 mg to 25 mg daily         Today's Vitals: BP (!) 149/74   Pulse 71   LMP  (LMP Unknown)   ----------------    BP Readings from Last 3 Encounters:   01/24/24 (!) 149/74   01/03/24 137/72   12/19/23 130/78     In clinic BP: 125/72; 66. After 5 minutes BP reading was 117/70, 66      I spent 33 minutes with this patient today. All changes were made via collaborative practice agreement with Gomez Blevins MD. A copy of the visit note was provided to the patient's provider(s).    A summary of these recommendations was given to the patient.         Medication Therapy Recommendations  Essential hypertension with goal blood pressure less than 140/90    Current Medication: hydrochlorothiazide (HYDRODIURIL) 12.5 MG tablet   Rationale: Dose too low - Dosage too low - Effectiveness   Recommendation: Increase Dose - hydrochlorothiazide 12.5 MG tablet - Increase HCTZ dose from 12.5 mg to 25 mg daily   Status: Accepted per CPA              Velma Medina, PharmD     Medication Therapy Management (MTM) Pharmacist     [phone]     [email or web address]

## 2024-01-24 NOTE — PATIENT INSTRUCTIONS
"Recommendations from today's MTM visit:                                                      MTM (medication therapy management) is a service provided by a clinical pharmacist designed to help you get the most of out of your medicines.   Today we reviewed what your medicines are for, how to know if they are working, that your medicines are safe and how to make your medicine regimen as easy as possible.        1. PharmD to watch patient put on marta sensor and assist if needed   2. Please bring your BP cuff with you on our next appointment   3. Please increase hydrochlorothiazide dose from 12.5 mg to 25 mg daily     Follow-up: Due on 02/26/2024    It was great speaking with you today.  I value your experience and would be very thankful for your time in providing feedback in our clinic survey. In the next few days, you may receive an email or text message from [employer] with a link to a survey related to your  clinical pharmacist.\"     To schedule another MTM appointment, please call the clinic directly or you may call the MTM scheduling line at [phone].    My Clinical Pharmacist's contact information:                                                      Please feel free to contact me with any questions or concerns you have.        Velma Medina, PharmD     Medication Therapy Management (MTM) Pharmacist     [phone]     [email or web address]     [facility]    "

## 2024-02-09 ENCOUNTER — OFFICE VISIT (OUTPATIENT)
Dept: INTERNAL MEDICINE | Facility: CLINIC | Age: 84
End: 2024-02-09
Payer: COMMERCIAL

## 2024-02-09 VITALS
WEIGHT: 175 LBS | TEMPERATURE: 97.8 F | OXYGEN SATURATION: 95 % | HEART RATE: 73 BPM | BODY MASS INDEX: 28.12 KG/M2 | DIASTOLIC BLOOD PRESSURE: 74 MMHG | RESPIRATION RATE: 12 BRPM | SYSTOLIC BLOOD PRESSURE: 124 MMHG | HEIGHT: 66 IN

## 2024-02-09 DIAGNOSIS — E11.22 CONTROLLED TYPE 2 DIABETES MELLITUS WITH STAGE 3 CHRONIC KIDNEY DISEASE, WITHOUT LONG-TERM CURRENT USE OF INSULIN (H): ICD-10-CM

## 2024-02-09 DIAGNOSIS — I10 PRIMARY HYPERTENSION: ICD-10-CM

## 2024-02-09 DIAGNOSIS — J01.00 ACUTE MAXILLARY SINUSITIS, RECURRENCE NOT SPECIFIED: ICD-10-CM

## 2024-02-09 DIAGNOSIS — R09.81 NASAL CONGESTION: ICD-10-CM

## 2024-02-09 DIAGNOSIS — M54.2 NECK PAIN: Primary | ICD-10-CM

## 2024-02-09 DIAGNOSIS — N18.32 STAGE 3B CHRONIC KIDNEY DISEASE (H): ICD-10-CM

## 2024-02-09 DIAGNOSIS — N18.30 CONTROLLED TYPE 2 DIABETES MELLITUS WITH STAGE 3 CHRONIC KIDNEY DISEASE, WITHOUT LONG-TERM CURRENT USE OF INSULIN (H): ICD-10-CM

## 2024-02-09 LAB
ANION GAP SERPL CALCULATED.3IONS-SCNC: 10 MMOL/L (ref 7–15)
BUN SERPL-MCNC: 52.4 MG/DL (ref 8–23)
CALCIUM SERPL-MCNC: 9.9 MG/DL (ref 8.8–10.2)
CHLORIDE SERPL-SCNC: 103 MMOL/L (ref 98–107)
CREAT SERPL-MCNC: 1.53 MG/DL (ref 0.51–0.95)
DEPRECATED HCO3 PLAS-SCNC: 28 MMOL/L (ref 22–29)
EGFRCR SERPLBLD CKD-EPI 2021: 33 ML/MIN/1.73M2
GLUCOSE SERPL-MCNC: 202 MG/DL (ref 70–99)
HBA1C MFR BLD: 7.7 % (ref 0–5.6)
POTASSIUM SERPL-SCNC: 3.7 MMOL/L (ref 3.4–5.3)
SODIUM SERPL-SCNC: 141 MMOL/L (ref 135–145)

## 2024-02-09 PROCEDURE — 80048 BASIC METABOLIC PNL TOTAL CA: CPT | Performed by: INTERNAL MEDICINE

## 2024-02-09 PROCEDURE — 83036 HEMOGLOBIN GLYCOSYLATED A1C: CPT | Performed by: INTERNAL MEDICINE

## 2024-02-09 PROCEDURE — 82570 ASSAY OF URINE CREATININE: CPT | Performed by: INTERNAL MEDICINE

## 2024-02-09 PROCEDURE — 36415 COLL VENOUS BLD VENIPUNCTURE: CPT | Performed by: INTERNAL MEDICINE

## 2024-02-09 PROCEDURE — 82043 UR ALBUMIN QUANTITATIVE: CPT | Performed by: INTERNAL MEDICINE

## 2024-02-09 PROCEDURE — 99214 OFFICE O/P EST MOD 30 MIN: CPT | Performed by: INTERNAL MEDICINE

## 2024-02-09 RX ORDER — TIZANIDINE 2 MG/1
2 TABLET ORAL DAILY PRN
Qty: 10 TABLET | Refills: 0 | Status: SHIPPED | OUTPATIENT
Start: 2024-02-09 | End: 2024-09-16

## 2024-02-09 RX ORDER — METHYLPREDNISOLONE 4 MG
TABLET, DOSE PACK ORAL
Qty: 21 TABLET | Refills: 0 | Status: SHIPPED | OUTPATIENT
Start: 2024-02-09 | End: 2024-04-25

## 2024-02-09 RX ORDER — DOXYCYCLINE HYCLATE 100 MG
100 TABLET ORAL 2 TIMES DAILY
Qty: 14 TABLET | Refills: 0 | Status: SHIPPED | OUTPATIENT
Start: 2024-02-09 | End: 2024-02-16

## 2024-02-09 NOTE — LETTER
February 15, 2024      Berta Witt  [address] [address]        Dear ,    We are writing to inform you of your test results.    Birdie,  Kidney function is stable.  Potassium level is good, continue on potassium supplement.  Sugars are better.  Dr IGGY Hernandez Orders   Albumin Random Urine Quantitative with Creat Ratio   Result Value Ref Range    Creatinine Urine mg/dL 31.5 mg/dL      Comment:      The reference ranges have not been established in urine creatinine. The results should be integrated into the clinical context for interpretation.    Albumin Urine mg/L <12.0 mg/L      Comment:      The reference ranges have not been established in urine albumin. The results should be integrated into the clinical context for interpretation.    Albumin Urine mg/g Cr        Comment:      Unable to calculate, urine albumin and/or urine creatinine is outside detectable limits.  Microalbuminuria is defined as an albumin:creatinine ratio of 17 to 299 for males and 25 to 299 for females. A ratio of albumin:creatinine of 300 or higher is indicative of overt proteinuria.  Due to biologic variability, positive results should be confirmed by a second, first-morning random or 24-hour timed urine specimen. If there is discrepancy, a third specimen is recommended. When 2 out of 3 results are in the microalbuminuria range, this is evidence for incipient nephropathy and warrants increased efforts at glucose control, blood pressure control, and institution of therapy with an angiotensin-converting-enzyme (ACE) inhibitor (if the patient can tolerate it).     Hemoglobin A1c   Result Value Ref Range    Hemoglobin A1C 7.7 (H) 0.0 - 5.6 %      Comment:      Normal <5.7%   Prediabetes 5.7-6.4%    Diabetes 6.5% or higher     Note: Adopted from ADA consensus guidelines.   Basic metabolic panel  (Ca, Cl, CO2, Creat, Gluc, K, Na, BUN)   Result Value Ref Range    Sodium 141 135 - 145 mmol/L      Comment:       Reference intervals for this test were updated on 09/26/2023 to more accurately reflect our healthy population. There may be differences in the flagging of prior results with similar values performed with this method. Interpretation of those prior results can be made in the context of the updated reference intervals.     Potassium 3.7 3.4 - 5.3 mmol/L    Chloride 103 98 - 107 mmol/L    Carbon Dioxide (CO2) 28 22 - 29 mmol/L    Anion Gap 10 7 - 15 mmol/L    Urea Nitrogen 52.4 (H) 8.0 - 23.0 mg/dL    Creatinine 1.53 (H) 0.51 - 0.95 mg/dL    GFR Estimate 33 (L) >60 mL/min/1.73m2    Calcium 9.9 8.8 - 10.2 mg/dL    Glucose 202 (H) 70 - 99 mg/dL       If you have any questions or concerns, please call the clinic at the number listed above.       Sincerely,      Dianna Lucero MD

## 2024-02-09 NOTE — PROGRESS NOTES
Assessment & Plan     Neck pain  Most likely sprain from uncomfortable sleeping position.  She did take 2 ibuprofens yesterday but given her significant renal insufficiency, discussed to stay away from that and use Tylenol.  Will try 5 days of Medrol Dosepak for inflammation, tiny dose of tizanidine renally dosed and physical therapy.  Discussed to make sure that she has a comfortable pillow and neck support at night.  - methylPREDNISolone (MEDROL DOSEPAK) 4 MG tablet therapy pack; Follow Package Directions  - tiZANidine (ZANAFLEX) 2 MG tablet; Take 1 tablet (2 mg) by mouth daily as needed for muscle spasms  - Physical Therapy Referral; Future    Controlled type 2 diabetes mellitus with stage 3 chronic kidney disease, without long-term current use of insulin (H)  She is due for repeat A1c.  She does have significant renal insufficiency, previously was addition of Jardiance her creatinine showed up to 1.9, currently she is off metformin and Jardiance and is on glipizide, she has marta monitoring monitored by our pharmacist closely.  - Albumin Random Urine Quantitative with Creat Ratio; Future  - Hemoglobin A1c; Future  - Albumin Random Urine Quantitative with Creat Ratio  - Hemoglobin A1c    Primary hypertension  Blood pressure today is at goal, she is on lisinopril 40 mg a day and hydrochlorothiazide was increased to 25 mg recently.  She is also on potassium supplement.  Will check kidney function today, of note she took 2 ibuprofens yesterday which can affect her weight today's results.  - Basic metabolic panel  (Ca, Cl, CO2, Creat, Gluc, K, Na, BUN      Acute maxillary sinusitis, recurrence not specified  Nasal congestion for the last 2 weeks, not improving, Medrol Dosepak should help decongest and will treat her for maxillary sinusitis with antibiotic  - methylPREDNISolone (MEDROL DOSEPAK) 4 MG tablet therapy pack; Follow Package Directions  - doxycycline hyclate (VIBRA-TABS) 100 MG tablet; Take 1 tablet (100  "mg) by mouth 2 times daily for 7 days    Stage 3b chronic kidney disease (H)  Baseline creatinine is 1.4 but recently has been fluctuating between 1.9 at jan to 1.6.  Discussed to avoid ibuprofen and NSAIDs.      BMI  Estimated body mass index is 28.25 kg/m  as calculated from the following:    Height as of this encounter: 1.676 m (5' 6\").    Weight as of this encounter: 79.4 kg (175 lb).       Subjective   Berta is a 83 year old, presenting for the following health issues:  Office Visit (Neck Pain began 2/8/2024. Pain both sides of neck and shoulders. Difficult to move. Pain scale at 10+ upon waking up. Pain now is at 6-7 if moving head. Keeping head still pain is at a 3. ) and Recheck Medication (Pt took Tylenol for pain.)      2/9/2024    12:02 PM   Additional Questions   Roomed by lindsey lozano   Accompanied by self         2/9/2024    12:02 PM   Patient Reported Additional Medications   Patient reports taking the following new medications no     History of Present Illness       Reason for visit:  Neck pain    She eats 2-3 servings of fruits and vegetables daily.She consumes 0 sweetened beverage(s) daily.She exercises with enough effort to increase her heart rate 30 to 60 minutes per day.  She exercises with enough effort to increase her heart rate 7 days per week.   She is taking medications regularly.     Berta is an 83-year-old patient with history of chronic renal insufficiency, type 2 diabetes, hypertension, hyperlipidemia, spinal stenosis, regular blood donations and intermittent anemia, mild aortic stenosis, she is currently here for acute visit due to acute neck pain.    Yesterday morning she woke up with severe neck pain, she must have sprained it while sleeping.  The pain is in the cervical paraspinal area bilaterally radiating in the trapezius area.  She denies any shooting pains down her arms, no weakness or numbness.  She has slightly decreased range of motion.  Range of motion is painful as is getting " "out of bed.  Yesterday she did took 2 ibuprofens which has helped.  Generally she does not take ibuprofen regularly.    She also has been having some nasal congestion for the last 2 weeks and difficulty breathing through her nose, no sore throat or cough.  She has been using nasal saline.    She has been seeing our pharmacist closely to manage her diabetes and hypertension.  Due to her renal insufficiency a lot of changes were made.  She is currently off metformin and we could not keep her on Jardiance since creatinine peaked at 1.9 but she is taking glipizide.  She is on lisinopril 40 mg and recently hydrochlorothiazide was increased to 25 mg.  She also takes potassium supplements.  She recently got the marta monitor hooked up by a [facility] pharmacist.    Review of systems: As above      Objective    /74 (BP Location: Left arm, Patient Position: Sitting, Cuff Size: Adult Regular)   Pulse 73   Temp 97.8  F (36.6  C) (Oral)   Resp 12   Ht 1.676 m (5' 6\")   Wt 79.4 kg (175 lb)   LMP  (LMP Unknown)   SpO2 95%   BMI 28.25 kg/m    Body mass index is 28.25 kg/m .  Physical Exam   General: well appearing older female, alert and oriented x3  EYES: Eyelids, conjunctiva, and sclera were normal. Pupils were normal.   HEAD, EARS, NOSE, MOUTH, AND THROAT: no cervical LAD, no thyromegaly or nodules appreciated. TMs are visualized and normal, oropharynx is clear.  RESPIRATORY: respirations non labored, CTA bl, no wheezes, rales, no forced expiratory wheezing.  CARDIOVASCULAR: Heart rate and rhythm were normal.  Mild systolic ejection murmur noted. There was no peripheral edema.   GASTROINTESTINAL: Positive bowel sounds, abdomen is soft, non tender, non distended.     MUSCULOSKELETAL: Muscle mass was normal for age. No joint synovitis or deformity.  Pain in the cervical paraspinal area and trapezius area but not severe, she still has some range of motion in her neck but not full.  Spurling test is negative.  She has good "  and arms bilaterally.  LYMPHATIC: There were no enlarged nodes palpable.  SKIN/HAIR/NAILS: Skin color was normal.  No rashes.  NEUROLOGIC: The patient was alert and oriented.  Speech was normal.  There is no facial asymmetry.   PSYCHIATRIC:  Mood and affect were normal.            Signed Electronically by: Dianna Lucero MD

## 2024-02-09 NOTE — PATIENT INSTRUCTIONS
A/P: 59M with pituitary mass planned for endoscopic transphenoidal hypophysectomy next week.  - Plan per Primary team  - Please keep ENT informed regarding timing of OR  - Contact ENT with any questions or concerns    Discussed with attending For neck pain: Avoid Ibuprofen/Aleve/naproxen due to slow kidney function.  -can take Tylenol 6500 mg 3 times a day  -can try Medrol dose pack: 5 days steroid pack, it may increase sugar transiently  -Can use heating pad   -Try muscle relaxant at bed time.  - PT    2. Start antibiotic to prevent sinus infection.   A/P: 59M with pituitary mass planned for endoscopic transphenoidal hypophysectomy next week.  - Plan per Primary team  - Please keep ENT informed regarding timing of OR  - Please ensure Visual field testing by Ophthalmology documented in eva pre-op  - Please ensure appropriate [place] navigation CT and MRI imaging available  - Contact ENT with any questions or concerns    Discussed with attending

## 2024-02-09 NOTE — LETTER
February 11, 2024      Berta Witt  [address]  [place] [address]        Dear ,    We are writing to inform you of your test results.    Birdie,  Kidney function is stable.  Potassium level is good, continue on potassium supplement.  Sugars are better.  Dr IGGY Wu   Hemoglobin A1c   Result Value Ref Range    Hemoglobin A1C 7.7 (H) 0.0 - 5.6 %      Comment:      Normal <5.7%   Prediabetes 5.7-6.4%    Diabetes 6.5% or higher     Note: Adopted from ADA consensus guidelines.   Basic metabolic panel  (Ca, Cl, CO2, Creat, Gluc, K, Na, BUN)   Result Value Ref Range    Sodium 141 135 - 145 mmol/L      Comment:      Reference intervals for this test were updated on 09/26/2023 to more accurately reflect our healthy population. There may be differences in the flagging of prior results with similar values performed with this method. Interpretation of those prior results can be made in the context of the updated reference intervals.     Potassium 3.7 3.4 - 5.3 mmol/L    Chloride 103 98 - 107 mmol/L    Carbon Dioxide (CO2) 28 22 - 29 mmol/L    Anion Gap 10 7 - 15 mmol/L    Urea Nitrogen 52.4 (H) 8.0 - 23.0 mg/dL    Creatinine 1.53 (H) 0.51 - 0.95 mg/dL    GFR Estimate 33 (L) >60 mL/min/1.73m2    Calcium 9.9 8.8 - 10.2 mg/dL    Glucose 202 (H) 70 - 99 mg/dL       If you have any questions or concerns, please call the clinic at the number listed above.       Sincerely,      Dianna Lucero MD

## 2024-02-12 ENCOUNTER — TRANSFERRED RECORDS (OUTPATIENT)
Dept: HEALTH INFORMATION MANAGEMENT | Facility: CLINIC | Age: 84
End: 2024-02-12
Payer: COMMERCIAL

## 2024-02-12 LAB — RETINOPATHY: NEGATIVE

## 2024-02-14 ENCOUNTER — APPOINTMENT (OUTPATIENT)
Dept: LAB | Facility: CLINIC | Age: 84
End: 2024-02-14
Payer: COMMERCIAL

## 2024-02-14 LAB
CREAT UR-MCNC: 31.5 MG/DL
MICROALBUMIN UR-MCNC: <12 MG/L
MICROALBUMIN/CREAT UR: NORMAL MG/G{CREAT}

## 2024-04-12 ENCOUNTER — NURSE TRIAGE (OUTPATIENT)
Dept: NURSING | Facility: CLINIC | Age: 84
End: 2024-04-12
Payer: COMMERCIAL

## 2024-04-12 NOTE — TELEPHONE ENCOUNTER
Pt calling with question about;    'Having seasonal allergy symptoms for last 2 - 3 days'  Dripping nose - clear, watery drainage  Pt reports, seems earlier this year  Wondering what OTC can take with medications already on board daily  __________________________________________________  ==> Spoke (PharmD) with patient and recommended Claritin or Zyrtec with deep sea for nose congestion in the setting of hypertension. Advised to reach out if symptoms did not alleviate.      ___________________________________________________      Denies;  Difficulty breathing/wheezing  Cough  Phlegm that is thick, yellow or green  Sx interfere with daily activities    According to the protocol, patient should be able to monitor/manage these symptoms at home unless new or worsening sx present, (may be a good idea to check with your pharmacist and let he/she know which RX's you take daily) then to call back.  Care advice given. Patient verbalizes understanding and agrees with plan of care.     Aissatou Ho RN, Nurse Advisor 4:05 PM 4/12/2024  Reason for Disposition   Nasal allergies occur only certain times of year    Additional Information   Negative: Wheezing (high pitched whistling sound) and previous asthma attacks or use of asthma medicines   Negative: Doesn't match the SYMPTOMS for nasal allergy   Negative: Patient sounds very sick or weak to the triager   Negative: Lots of coughing   Negative: Lots of yellow or green discharge from nose, present > 3 days   Negative: Nasal discharge present > 10 days   Negative: MODERATE-SEVERE nasal allergy symptoms (i.e., interfere with sleep, school, or work) and taking antihistamines > 2 days   Negative: Patient wants to be seen   Negative: Nasal allergies occur only certain times of year and diagnosis of hay fever has never been confirmed by a doctor (or NP/PA)   Negative: Nasal allergies occur year-round   Negative: Snores most nights of month    Protocols used: Nasal Allergies (Hay  Fever)-A-[place]

## 2024-04-25 ENCOUNTER — OFFICE VISIT (OUTPATIENT)
Dept: INTERNAL MEDICINE | Facility: CLINIC | Age: 84
End: 2024-04-25
Payer: COMMERCIAL

## 2024-04-25 VITALS
HEART RATE: 64 BPM | DIASTOLIC BLOOD PRESSURE: 62 MMHG | SYSTOLIC BLOOD PRESSURE: 118 MMHG | OXYGEN SATURATION: 98 % | TEMPERATURE: 98.2 F | RESPIRATION RATE: 18 BRPM | WEIGHT: 180 LBS | HEIGHT: 66 IN | BODY MASS INDEX: 28.93 KG/M2

## 2024-04-25 DIAGNOSIS — M79.645 PAIN OF FINGER OF LEFT HAND: Primary | ICD-10-CM

## 2024-04-25 DIAGNOSIS — N18.32 STAGE 3B CHRONIC KIDNEY DISEASE (H): ICD-10-CM

## 2024-04-25 DIAGNOSIS — I10 PRIMARY HYPERTENSION: ICD-10-CM

## 2024-04-25 DIAGNOSIS — E11.22 CONTROLLED TYPE 2 DIABETES MELLITUS WITH STAGE 3 CHRONIC KIDNEY DISEASE, WITHOUT LONG-TERM CURRENT USE OF INSULIN (H): ICD-10-CM

## 2024-04-25 DIAGNOSIS — N18.30 CONTROLLED TYPE 2 DIABETES MELLITUS WITH STAGE 3 CHRONIC KIDNEY DISEASE, WITHOUT LONG-TERM CURRENT USE OF INSULIN (H): ICD-10-CM

## 2024-04-25 LAB
BASOPHILS # BLD AUTO: 0 10E3/UL (ref 0–0.2)
BASOPHILS NFR BLD AUTO: 0 %
EOSINOPHIL # BLD AUTO: 0.2 10E3/UL (ref 0–0.7)
EOSINOPHIL NFR BLD AUTO: 3 %
ERYTHROCYTE [DISTWIDTH] IN BLOOD BY AUTOMATED COUNT: 13.2 % (ref 10–15)
HBA1C MFR BLD: 8 % (ref 0–5.6)
HCT VFR BLD AUTO: 37.3 % (ref 35–47)
HGB BLD-MCNC: 12.2 G/DL (ref 11.7–15.7)
IMM GRANULOCYTES # BLD: 0 10E3/UL
IMM GRANULOCYTES NFR BLD: 0 %
LYMPHOCYTES # BLD AUTO: 1.5 10E3/UL (ref 0.8–5.3)
LYMPHOCYTES NFR BLD AUTO: 28 %
MCH RBC QN AUTO: 30 PG (ref 26.5–33)
MCHC RBC AUTO-ENTMCNC: 32.7 G/DL (ref 31.5–36.5)
MCV RBC AUTO: 92 FL (ref 78–100)
MONOCYTES # BLD AUTO: 0.5 10E3/UL (ref 0–1.3)
MONOCYTES NFR BLD AUTO: 9 %
NEUTROPHILS # BLD AUTO: 3.2 10E3/UL (ref 1.6–8.3)
NEUTROPHILS NFR BLD AUTO: 59 %
PLATELET # BLD AUTO: 174 10E3/UL (ref 150–450)
RBC # BLD AUTO: 4.07 10E6/UL (ref 3.8–5.2)
WBC # BLD AUTO: 5.4 10E3/UL (ref 4–11)

## 2024-04-25 PROCEDURE — 36415 COLL VENOUS BLD VENIPUNCTURE: CPT | Performed by: INTERNAL MEDICINE

## 2024-04-25 PROCEDURE — 80048 BASIC METABOLIC PNL TOTAL CA: CPT | Performed by: INTERNAL MEDICINE

## 2024-04-25 PROCEDURE — 85025 COMPLETE CBC W/AUTO DIFF WBC: CPT | Performed by: INTERNAL MEDICINE

## 2024-04-25 PROCEDURE — 83036 HEMOGLOBIN GLYCOSYLATED A1C: CPT | Performed by: INTERNAL MEDICINE

## 2024-04-25 PROCEDURE — G2211 COMPLEX E/M VISIT ADD ON: HCPCS | Performed by: INTERNAL MEDICINE

## 2024-04-25 PROCEDURE — 99214 OFFICE O/P EST MOD 30 MIN: CPT | Performed by: INTERNAL MEDICINE

## 2024-04-25 PROCEDURE — 84550 ASSAY OF BLOOD/URIC ACID: CPT | Performed by: INTERNAL MEDICINE

## 2024-04-25 PROCEDURE — 84100 ASSAY OF PHOSPHORUS: CPT | Performed by: INTERNAL MEDICINE

## 2024-04-25 PROCEDURE — 83970 ASSAY OF PARATHORMONE: CPT | Performed by: INTERNAL MEDICINE

## 2024-04-25 RX ORDER — HYDROCHLOROTHIAZIDE 25 MG/1
25 TABLET ORAL DAILY
Status: SHIPPED
Start: 2024-04-25

## 2024-04-25 NOTE — PROGRESS NOTES
"  Assessment & Plan     Pain of finger of left hand  Currently have resolved with Tylenol and icing after 2 weeks, could be irregular cervicitis, no previous episodes so unlikely she has inflammatory arthritic type of arthritis, she also has no hand deformities.  Gout certainly is possible, she has never had an episode before and does not have any family history of it.  Will check uric acid.  - Uric acid  - CBC with platelets and differential    Controlled type 2 diabetes mellitus with stage 3 chronic kidney disease, without long-term current use of insulin (H)  She is currently on glipizide 5 mg a day, while she was at [facility] [place] has not been exercising and gained 5 pounds, previously she was on metformin but we had to stop it due to worsening kidney function and when tried Jardiance and then that her creatinine showed up so we had to stop it when it became 1.9.  Currently she is tolerating glipizide well and denies any hypoglycemia.  - Hemoglobin A1c  - Basic metabolic panel  (Ca, Cl, CO2, Creat, Gluc, K, Na, BUN)    Stage 3b chronic kidney disease (H)  Kidney function fluctuates between 1.6 and 1.9  - CBC with platelets and differential  - Basic metabolic panel  (Ca, Cl, CO2, Creat, Gluc, K, Na, BUN)  - Phosphorus  - Parathyroid Hormone Intact    Primary hypertension  Currently well-controlled on lisinopril 40 mg a day, potassium 20 mEq a day and hydrochlorothiazide 25 mg a day, will check BMP today.  - hydrochlorothiazide (HYDRODIURIL) 25 MG tablet; Take 1 tablet (25 mg) by mouth daily    BMI  Estimated body mass index is 29.05 kg/m  as calculated from the following:    Height as of this encounter: 1.676 m (5' 6\").    Weight as of this encounter: 81.6 kg (180 lb).       Laura Hampton is a 83 year old, presenting for the following health issues:  Finger (Left pinky finger pains ) and Diabetes (Routine check in - labs if needed )      4/25/2024     2:51 PM   Additional Questions   Roomed by " GISELLE Duran   Accompanied by alone         4/25/2024     2:51 PM   Patient Reported Additional Medications   Patient reports taking the following new medications none     History of Present Illness       Diabetes:   She presents for follow up of diabetes.   She is checking home blood glucose with a continuous glucose monitor.   She checks blood glucose before meals, before and after meals and at bedtime.  Blood glucose is sometimes over 200 and never under 70.  When her blood glucose is low, the patient is asymptomatic for confusion, blurred vision, lethargy and reports not feeling dizzy, shaky, or weak.   She has no concerns regarding her diabetes at this time.   She is not experiencing numbness or burning in feet, excessive thirst, blurry vision, weight changes or redness, sores or blisters on feet.           Hypertension: She presents for follow up of hypertension.  She does not check blood pressure  regularly outside of the clinic. Outside blood pressures have been over 140/90. She follows a low salt diet.     She eats 2-3 servings of fruits and vegetables daily.She consumes 0 sweetened beverage(s) daily.She exercises with enough effort to increase her heart rate 30 to 60 minutes per day.    She is taking medications regularly.     Berta is an 83-year-old patient with history of chronic renal insufficiency, type 2 diabetes, hypertension, hyperlipidemia, spinal stenosis, regular blood donations and intermittent anemia, mild aortic stenosis, she is currently here for acute visit due to episode of acute swelling and pain at the base of her left fifth finger.  Symptoms started 2 weeks ago out of the blue, she denies any injury, she was seen by another provider and gout was suspected.  Symptoms resolved with using ice and Tylenol after a week.  She denies any similar symptoms in the past, no family history of gout, no chronic hand stiffness or deformities.  Generally she does not eat red meat but was visiting her  "sister in [place] and was eating some red meat and liver there but has not had episodes of any joint pain or swelling when she was there.    For diabetes she is currently on glipizide only and off metformin and Jardiance due to worsening renal function.  She does have continuous glucose monitor, sugars have been 150 or less.    For hypertension she is on lisinopril 40 mg, potassium 20 mEq and hydrochlorothiazide 25 mg a day.  Blood pressure today has been great.    Review of systems: As above      Objective    /62 (BP Location: Left arm, Patient Position: Sitting, Cuff Size: Adult Regular)   Pulse 64   Temp 98.2  F (36.8  C) (Tympanic)   Resp 18   Ht 1.676 m (5' 6\")   Wt 81.6 kg (180 lb)   LMP  (LMP Unknown)   SpO2 98%   BMI 29.05 kg/m    Body mass index is 29.05 kg/m .  Physical Exam   General: well appearing female, alert and oriented x3  EYES: Eyelids, conjunctiva, and sclera were normal. Pupils were normal.   HEAD, EARS, NOSE, MOUTH, AND THROAT: no cervical LAD, no thyromegaly or nodules appreciated. TMs are visualized and normal, oropharynx is clear.  RESPIRATORY: respirations non labored, CTA bl, no wheezes, rales, no forced expiratory wheezing.  CARDIOVASCULAR: Heart rate and rhythm were normal. No murmurs, rubs,gallops. There was no peripheral edema. No carotid bruits.  GASTROINTESTINAL: Positive bowel sounds, abdomen is soft, non tender, non distended.     MUSCULOSKELETAL: Muscle mass was normal for age. No joint synovitis or deformity.  Currently hands appear normal, no synovitis stiffness or joint deformities.  LYMPHATIC: There were no enlarged nodes palpable.  SKIN/HAIR/NAILS: Skin color was normal.  No rashes.  NEUROLOGIC: The patient was alert and oriented.  Speech was normal.  There is no facial asymmetry.   PSYCHIATRIC:  Mood and affect were normal.            Signed Electronically by: Dianna Lucero MD    "

## 2024-04-25 NOTE — LETTER
April 28, 2024      Robbingarrett Witt  [address]  [place] [address]        Dear ,    We are writing to inform you of your test results.    Birdie,  Uric acid is slightly elevated. Please void red meat, shellfish and alcohol. If you have another episode of joint pain and swelling, let me know.  Kidney function is sluggish, overall stable, make sure to stay hydrated.  Sugars/A1C is slightly worse, please cut down on sweets.   Red cell count is normal.  Dr IGGY Hernandez Orders   Uric acid   Result Value Ref Range    Uric Acid 7.1 (H) 2.4 - 5.7 mg/dL   Hemoglobin A1c   Result Value Ref Range    Hemoglobin A1C 8.0 (H) 0.0 - 5.6 %   Basic metabolic panel  (Ca, Cl, CO2, Creat, Gluc, K, Na, BUN)   Result Value Ref Range    Sodium 139 135 - 145 mmol/L      Comment:      Reference intervals for this test were updated on 09/26/2023 to more accurately reflect our healthy population. There may be differences in the flagging of prior results with similar values performed with this method. Interpretation of those prior results can be made in the context of the updated reference intervals.     Potassium 3.9 3.4 - 5.3 mmol/L    Chloride 105 98 - 107 mmol/L    Carbon Dioxide (CO2) 24 22 - 29 mmol/L    Anion Gap 10 7 - 15 mmol/L    Urea Nitrogen 42.5 (H) 8.0 - 23.0 mg/dL    Creatinine 1.73 (H) 0.51 - 0.95 mg/dL    GFR Estimate 29 (L) >60 mL/min/1.73m2    Calcium 9.5 8.8 - 10.2 mg/dL    Glucose 152 (H) 70 - 99 mg/dL   Phosphorus   Result Value Ref Range    Phosphorus 3.0 2.5 - 4.5 mg/dL   Parathyroid Hormone Intact   Result Value Ref Range    Parathyroid Hormone Intact 38 15 - 65 pg/mL    Narrative    This result was obtained with the Roche Elecsys PTH STAT assay.   This reference range differs from PTH assays used in other [facility] laboratories.   CBC with platelets and differential   Result Value Ref Range    WBC Count 5.4 4.0 - 11.0 10e3/uL    RBC Count 4.07 3.80 - 5.20 10e6/uL    Hemoglobin  12.2 11.7 - 15.7 g/dL    Hematocrit 37.3 35.0 - 47.0 %    MCV 92 78 - 100 fL    MCH 30.0 26.5 - 33.0 pg    MCHC 32.7 31.5 - 36.5 g/dL    RDW 13.2 10.0 - 15.0 %    Platelet Count 174 150 - 450 10e3/uL    % Neutrophils 59 %    % Lymphocytes 28 %    % Monocytes 9 %    % Eosinophils 3 %    % Basophils 0 %    % Immature Granulocytes 0 %    Absolute Neutrophils 3.2 1.6 - 8.3 10e3/uL    Absolute Lymphocytes 1.5 0.8 - 5.3 10e3/uL    Absolute Monocytes 0.5 0.0 - 1.3 10e3/uL    Absolute Eosinophils 0.2 0.0 - 0.7 10e3/uL    Absolute Basophils 0.0 0.0 - 0.2 10e3/uL    Absolute Immature Granulocytes 0.0 <=0.4 10e3/uL       If you have any questions or concerns, please call the clinic at the number listed above.       Sincerely,      Dianna Lucero MD

## 2024-04-25 NOTE — PATIENT INSTRUCTIONS
Will check labs today, uric acid and diabetic labs    2. Let me now if pain and swelling happens again.    3. Try to loose 5 lbs.

## 2024-04-26 LAB
ANION GAP SERPL CALCULATED.3IONS-SCNC: 10 MMOL/L (ref 7–15)
BUN SERPL-MCNC: 42.5 MG/DL (ref 8–23)
CALCIUM SERPL-MCNC: 9.5 MG/DL (ref 8.8–10.2)
CHLORIDE SERPL-SCNC: 105 MMOL/L (ref 98–107)
CREAT SERPL-MCNC: 1.73 MG/DL (ref 0.51–0.95)
DEPRECATED HCO3 PLAS-SCNC: 24 MMOL/L (ref 22–29)
EGFRCR SERPLBLD CKD-EPI 2021: 29 ML/MIN/1.73M2
GLUCOSE SERPL-MCNC: 152 MG/DL (ref 70–99)
PHOSPHATE SERPL-MCNC: 3 MG/DL (ref 2.5–4.5)
POTASSIUM SERPL-SCNC: 3.9 MMOL/L (ref 3.4–5.3)
PTH-INTACT SERPL-MCNC: 38 PG/ML (ref 15–65)
SODIUM SERPL-SCNC: 139 MMOL/L (ref 135–145)
URATE SERPL-MCNC: 7.1 MG/DL (ref 2.4–5.7)

## 2024-05-03 DIAGNOSIS — E11.22 CONTROLLED TYPE 2 DIABETES MELLITUS WITH STAGE 3 CHRONIC KIDNEY DISEASE, WITHOUT LONG-TERM CURRENT USE OF INSULIN (H): ICD-10-CM

## 2024-05-03 DIAGNOSIS — N18.30 CONTROLLED TYPE 2 DIABETES MELLITUS WITH STAGE 3 CHRONIC KIDNEY DISEASE, WITHOUT LONG-TERM CURRENT USE OF INSULIN (H): ICD-10-CM

## 2024-05-03 RX ORDER — GLIPIZIDE 5 MG/1
5 TABLET, FILM COATED, EXTENDED RELEASE ORAL DAILY
Qty: 90 TABLET | Refills: 3 | Status: SHIPPED | OUTPATIENT
Start: 2024-05-03

## 2024-05-09 ENCOUNTER — OFFICE VISIT (OUTPATIENT)
Dept: PHARMACY | Facility: CLINIC | Age: 84
End: 2024-05-09
Payer: COMMERCIAL

## 2024-05-09 VITALS — BODY MASS INDEX: 28.33 KG/M2 | WEIGHT: 175.5 LBS | SYSTOLIC BLOOD PRESSURE: 118 MMHG | DIASTOLIC BLOOD PRESSURE: 66 MMHG

## 2024-05-09 PROCEDURE — 99607 MTMS BY PHARM ADDL 15 MIN: CPT

## 2024-05-09 PROCEDURE — 99606 MTMS BY PHARM EST 15 MIN: CPT

## 2024-05-09 RX ORDER — KETOTIFEN FUMARATE 0.025 %
11 DROPS OPHTHALMIC (EYE) DAILY
COMMUNITY

## 2024-05-09 NOTE — PROGRESS NOTES
Medication Therapy Management (MTM) Encounter    ASSESSMENT:                            Medication Adherence/Access: No issues identified.She has a container that she puts her medications for the next day. She got a pillbox, but has not used it yet.     Diabetes Type II:  A1C slightly above goal of < 8. Most of the CGM readings are within the target range (70 - 180) 60%. She is only on glipizide XL 5 mg daily.   Considering patient's low renal function appropriate to make some lifestyle changes such as drinking enough water and eating fruits that have antioxidant activities ( the cherries and berries). Patient might benefit from renal follow up; we will communicate with PCP.      Hypertension: In clinic BP mostly within goal of < 130/80. Advised patient to check her BP and report to writer. Considering controlled BP reasonable to continue current medications.     Dietary supplement: Continue taking supplements     PLAN:                          PharmD to check into high protein and kidney function. PharmD also to discuss if patient would need to see kidney doctor   Please drink enough water every day, and also eat fruits like the berries and cherries     Continue taking current medication as prescribed    Follow-up: Due on 06/13/2024 @ 8:30 AM    SUBJECTIVE/OBJECTIVE:                          Berta Witt is a 83 year old female seen for a follow visit.      Reason for visit: Medication review and Diabetes.    Allergies/ADRs: Reviewed in chart  Past Medical History: Reviewed in chart  Tobacco: She reports that she has never smoked. She has never used smokeless tobacco.    Medication Adherence/Access: She has a container that she puts her medications for the next day. She has a pillbox, but has not used it yet.      Diabetes  Type II: Currently not taking metformin and Jardiance due to low renal function. She is also not taking Januvia.  Patient does not like injecting insulin. Currently she is only taking glipizide  XL 5 mg daily.   Not taking aspirin due age  Blood sugar monitoring: never  Current diabetes symptoms: none  Diet/Exercise: She said she eats healthy; she does not eat a lot of proteins and she eats a lot fruits and veggies. She said she is really conscious of what she eats. She never had a sweat stuff, but ate once at  [employer]. If she has sweats at home she would eat them though. She adds honey in her coffee in the morning, but not anymore. Noted she was eating a little bit more sugary food during Christmas and new year.      Eye exam is up to date  Foot exam: due  Urine Albumin:   Lab Results   Component Value Date    UMALCR  02/09/2024      Comment:      Unable to calculate, urine albumin and/or urine creatinine is outside detectable limits.  Microalbuminuria is defined as an albumin:creatinine ratio of 17 to 299 for males and 25 to 299 for females. A ratio of albumin:creatinine of 300 or higher is indicative of overt proteinuria.  Due to biologic variability, positive results should be confirmed by a second, first-morning random or 24-hour timed urine specimen. If there is discrepancy, a third specimen is recommended. When 2 out of 3 results are in the microalbuminuria range, this is evidence for incipient nephropathy and warrants increased efforts at glucose control, blood pressure control, and institution of therapy with an angiotensin-converting-enzyme (ACE) inhibitor (if the patient can tolerate it).        Lab Results   Component Value Date    A1C 8.0 (H) 04/25/2024             Ozempic 28 days $47.00 Mounjaro 28 days $47.00 trulicity 28 days $47.00 victoza not covered Bydureon 28 days $47.00 rybelsus 30 days $47.00    Hypertension :    Lisinopril 40 mg daily    Hydrochlorothiazide 25 mg daily    Said her blood pressure has not been high. She said the last time she checked her BP was normal. She tries to walk 5 thousand steps a day.   Patient reports no current medication side effects  Patient self monitors  "blood pressure.  Home BP monitoring sometimes . She has been checking her BP readings, but forgot to bring the readings today. BP readings are a bit high these days, and we should consider adding a new med or adjust the medication the patient is taking.     Manual BP:  120/70, 124/68, and 118/66    BP Readings from Last 3 Encounters:   05/09/24 118/66   04/25/24 118/62   02/09/24 124/74     Pulse Readings from Last 3 Encounters:   04/25/24 64   02/09/24 73   01/24/24 71     Dietary Supplement:     Multivitamins and minerals  daily   Purple Blue Bo proteins shake - as needed for breakfast   [place] Burn -- patient is going to try this for weight loss     Estimated body mass index is 28.33 kg/m  as calculated from the following:    Height as of 4/25/24: 5' 6\" (1.676 m).    Weight as of this encounter: 175 lb 8 oz (79.6 kg).        Today's Vitals: /66   Wt 175 lb 8 oz (79.6 kg)   LMP  (LMP Unknown)   BMI 28.33 kg/m    ----------------      I spent 60 minutes with this patient today. All changes were made via collaborative practice agreement with Dianna Lucero MD. A copy of the visit note was provided to the patient's provider(s).    A summary of these recommendations was given to the patient.     Medication Therapy Recommendations  No medication therapy recommendations to display     Velma Medina, Yana     Medication Therapy Management (MTM) Pharmacist     [phone]     [email or web address]    "

## 2024-05-09 NOTE — Clinical Note
Carter PARKINSON Patient could benefit from renal referral if she does not have one yet. She could get some tips.   Thanks, Velma

## 2024-05-09 NOTE — PATIENT INSTRUCTIONS
"Recommendations from today's MTM visit:                                                      MTM (medication therapy management) is a service provided by a clinical pharmacist designed to help you get the most of out of your medicines.   Today we reviewed what your medicines are for, how to know if they are working, that your medicines are safe and how to make your medicine regimen as easy as possible.      PharmD to check into high protein and kidney function. PharmD also to discuss if patient would need to see kidney doctor   Please drink enough water every day, and also eat fruits like the berries and cherries     Continue taking current medication as prescribed    Follow-up: Due on 06/13/2024 @ 8:30 AM    It was great speaking with you today.  I value your experience and would be very thankful for your time in providing feedback in our clinic survey. In the next few days, you may receive an email or text message from [employer] with a link to a survey related to your  clinical pharmacist.\"     To schedule another MTM appointment, please call the clinic directly or you may call the MTM scheduling line at [phone].    My Clinical Pharmacist's contact information:                                                      Please feel free to contact me with any questions or concerns you have.        Velma Medina, PharmD     Medication Therapy Management (MTM) Pharmacist     [phone]     [email or web address]     [facility]    "

## 2024-05-10 ENCOUNTER — TELEPHONE (OUTPATIENT)
Dept: INTERNAL MEDICINE | Facility: CLINIC | Age: 84
End: 2024-05-10

## 2024-05-10 ENCOUNTER — PATIENT OUTREACH (OUTPATIENT)
Dept: CARE COORDINATION | Facility: CLINIC | Age: 84
End: 2024-05-10
Payer: COMMERCIAL

## 2024-05-10 NOTE — TELEPHONE ENCOUNTER
FYI - Status Update    Who is Calling: patient    Update: pt got reminder to schedule her yearly appt - she was just in at the end of April for an office visit and got labwork done. Pt is wanting clarification from Dr. Lucero if pt should come in for annual so soon after her recent office visit. Pt also have another office visit 7/17.     Does caller want a call/response back: Yes     Could we send this information to you in [employer] or would you prefer to receive a phone call?:   Patient would prefer a phone call   Okay to leave a detailed message?: Yes at Cell number on file:    Telephone Information:   Mobile [phone]

## 2024-05-12 ENCOUNTER — TELEPHONE (OUTPATIENT)
Dept: INTERNAL MEDICINE | Facility: CLINIC | Age: 84
End: 2024-05-12
Payer: COMMERCIAL

## 2024-05-12 DIAGNOSIS — E11.22 CONTROLLED TYPE 2 DIABETES MELLITUS WITH STAGE 3 CHRONIC KIDNEY DISEASE, WITHOUT LONG-TERM CURRENT USE OF INSULIN (H): ICD-10-CM

## 2024-05-12 DIAGNOSIS — N18.30 CONTROLLED TYPE 2 DIABETES MELLITUS WITH STAGE 3 CHRONIC KIDNEY DISEASE, WITHOUT LONG-TERM CURRENT USE OF INSULIN (H): ICD-10-CM

## 2024-05-12 DIAGNOSIS — N18.32 STAGE 3B CHRONIC KIDNEY DISEASE (H): ICD-10-CM

## 2024-05-12 DIAGNOSIS — N18.30 CHRONIC RENAL INSUFFICIENCY, STAGE 3 (MODERATE) (H): Primary | ICD-10-CM

## 2024-05-12 NOTE — TELEPHONE ENCOUNTER
Please let pt know,  Due to moderate renal insufficieny and diabetes, I would like her to establish care with a kidney specialist. Please place order if she is agreeable.

## 2024-05-13 NOTE — TELEPHONE ENCOUNTER
Spoke with patient to relay the message from Dr. Lucero. Patient would like to see a kidney specialist. Pended referral for nephrology.

## 2024-05-15 NOTE — CONFIDENTIAL NOTE
DIAGNOSIS:    Stage 3b chronic kidney disease (H)   Controlled type 2 diabetes mellitus with stage 3 chronic kidney disease, without long-term current use of insulin      DATE RECEIVED:  05.24.2024   NOTES STATUS DETAILS   OFFICE NOTE from referring provider Internal 05.12.2024 Dianna Lucero MD    OFFICE NOTE from other specialist      *Only VASCULITIS or LUPUS gather office notes for the following     *PULMONARY       *ENT     *DERMATOLOGY     *RHEUMATOLOGY     DISCHARGE SUMMARY from hospital     DISCHARGE REPORT from the ER     MEDICATION LIST Internal    IMAGING  (NEED IMAGES AND REPORTS)     KIDNEY CT SCAN     KIDNEY ULTRASOUND     MR ABDOMEN     NUCLEAR MEDICINE RENAL     LABS     CBC Internal 04.25.2024   CMP Internal 04.25.2024    BMP Internal 04.25.2024    UA Internal 01.11.2022   URINE PROTEIN Internal 01.22.2022   RENAL PANEL     BIOPSY     KIDNEY BIOPSY

## 2024-05-16 DIAGNOSIS — N18.32 STAGE 3B CHRONIC KIDNEY DISEASE (H): Primary | ICD-10-CM

## 2024-05-24 ENCOUNTER — PRE VISIT (OUTPATIENT)
Dept: NEPHROLOGY | Facility: CLINIC | Age: 84
End: 2024-05-24

## 2024-05-24 ENCOUNTER — LAB (OUTPATIENT)
Dept: LAB | Facility: CLINIC | Age: 84
End: 2024-05-24
Payer: COMMERCIAL

## 2024-05-24 ENCOUNTER — OFFICE VISIT (OUTPATIENT)
Dept: NEPHROLOGY | Facility: CLINIC | Age: 84
End: 2024-05-24
Attending: INTERNAL MEDICINE
Payer: COMMERCIAL

## 2024-05-24 VITALS
HEART RATE: 79 BPM | WEIGHT: 174.2 LBS | DIASTOLIC BLOOD PRESSURE: 84 MMHG | SYSTOLIC BLOOD PRESSURE: 154 MMHG | BODY MASS INDEX: 28.12 KG/M2 | OXYGEN SATURATION: 100 %

## 2024-05-24 DIAGNOSIS — N18.32 STAGE 3B CHRONIC KIDNEY DISEASE (H): ICD-10-CM

## 2024-05-24 DIAGNOSIS — E11.22 CONTROLLED TYPE 2 DIABETES MELLITUS WITH STAGE 3 CHRONIC KIDNEY DISEASE, WITHOUT LONG-TERM CURRENT USE OF INSULIN (H): ICD-10-CM

## 2024-05-24 DIAGNOSIS — I10 ESSENTIAL HYPERTENSION WITH GOAL BLOOD PRESSURE LESS THAN 140/90: Primary | ICD-10-CM

## 2024-05-24 DIAGNOSIS — N18.30 CONTROLLED TYPE 2 DIABETES MELLITUS WITH STAGE 3 CHRONIC KIDNEY DISEASE, WITHOUT LONG-TERM CURRENT USE OF INSULIN (H): ICD-10-CM

## 2024-05-24 LAB
ALBUMIN MFR UR ELPH: <6 MG/DL
ALBUMIN SERPL BCG-MCNC: 4.2 G/DL (ref 3.5–5.2)
ALBUMIN UR-MCNC: NEGATIVE MG/DL
ANION GAP SERPL CALCULATED.3IONS-SCNC: 13 MMOL/L (ref 7–15)
APPEARANCE UR: CLEAR
BILIRUB UR QL STRIP: NEGATIVE
BUN SERPL-MCNC: 36.1 MG/DL (ref 8–23)
CALCIUM SERPL-MCNC: 9.7 MG/DL (ref 8.8–10.2)
CHLORIDE SERPL-SCNC: 104 MMOL/L (ref 98–107)
COLOR UR AUTO: NORMAL
CREAT SERPL-MCNC: 1.59 MG/DL (ref 0.51–0.95)
CREAT UR-MCNC: 42.3 MG/DL
CREAT UR-MCNC: 42.8 MG/DL
DEPRECATED HCO3 PLAS-SCNC: 26 MMOL/L (ref 22–29)
EGFRCR SERPLBLD CKD-EPI 2021: 32 ML/MIN/1.73M2
ERYTHROCYTE [DISTWIDTH] IN BLOOD BY AUTOMATED COUNT: 13.2 % (ref 10–15)
GLUCOSE SERPL-MCNC: 175 MG/DL (ref 70–99)
GLUCOSE UR STRIP-MCNC: NEGATIVE MG/DL
HCT VFR BLD AUTO: 38.6 % (ref 35–47)
HGB BLD-MCNC: 12.6 G/DL (ref 11.7–15.7)
HGB UR QL STRIP: NEGATIVE
KETONES UR STRIP-MCNC: NEGATIVE MG/DL
LEUKOCYTE ESTERASE UR QL STRIP: NEGATIVE
MCH RBC QN AUTO: 29.9 PG (ref 26.5–33)
MCHC RBC AUTO-ENTMCNC: 32.6 G/DL (ref 31.5–36.5)
MCV RBC AUTO: 92 FL (ref 78–100)
MICROALBUMIN UR-MCNC: <12 MG/L
MICROALBUMIN/CREAT UR: NORMAL MG/G{CREAT}
NITRATE UR QL: NEGATIVE
PH UR STRIP: 5 [PH] (ref 5–7)
PHOSPHATE SERPL-MCNC: 3.2 MG/DL (ref 2.5–4.5)
PLATELET # BLD AUTO: 204 10E3/UL (ref 150–450)
POTASSIUM SERPL-SCNC: 3.6 MMOL/L (ref 3.4–5.3)
PROT/CREAT 24H UR: NORMAL MG/G{CREAT}
RBC # BLD AUTO: 4.22 10E6/UL (ref 3.8–5.2)
RBC URINE: 1 /HPF
SODIUM SERPL-SCNC: 143 MMOL/L (ref 135–145)
SP GR UR STRIP: 1.01 (ref 1–1.03)
UROBILINOGEN UR STRIP-MCNC: NORMAL MG/DL
VIT D+METAB SERPL-MCNC: 46 NG/ML (ref 20–50)
WBC # BLD AUTO: 4.6 10E3/UL (ref 4–11)
WBC URINE: 2 /HPF

## 2024-05-24 PROCEDURE — 84156 ASSAY OF PROTEIN URINE: CPT | Performed by: PATHOLOGY

## 2024-05-24 PROCEDURE — 85027 COMPLETE CBC AUTOMATED: CPT | Performed by: PATHOLOGY

## 2024-05-24 PROCEDURE — 80069 RENAL FUNCTION PANEL: CPT | Performed by: PATHOLOGY

## 2024-05-24 PROCEDURE — 99000 SPECIMEN HANDLING OFFICE-LAB: CPT | Performed by: PATHOLOGY

## 2024-05-24 PROCEDURE — 36415 COLL VENOUS BLD VENIPUNCTURE: CPT | Performed by: PATHOLOGY

## 2024-05-24 PROCEDURE — 82043 UR ALBUMIN QUANTITATIVE: CPT

## 2024-05-24 PROCEDURE — G0463 HOSPITAL OUTPT CLINIC VISIT: HCPCS

## 2024-05-24 PROCEDURE — 81001 URINALYSIS AUTO W/SCOPE: CPT | Performed by: PATHOLOGY

## 2024-05-24 PROCEDURE — 82306 VITAMIN D 25 HYDROXY: CPT

## 2024-05-24 PROCEDURE — 99204 OFFICE O/P NEW MOD 45 MIN: CPT | Mod: GC

## 2024-05-24 ASSESSMENT — PAIN SCALES - GENERAL: PAINLEVEL: NO PAIN (0)

## 2024-05-24 NOTE — LETTER
5/24/2024       RE: Berta Witt  [address]  [facility] [address]     Dear Colleague,    Thank you for referring your patient, Berta Witt, to the [facility] [place] [facility]. Please see a copy of my visit note below.    Assessment and Plan:    # CKD G4A1, Secondary to DKD.  Creatinine has been elevated since 2018, progressed from 1.34 to 1.6 since. No proteinuria. Had a rise to 1.9 from 1.6 after she started jardiance,then goes back to 1.6  with withdrawal. Given that she has CKD I would re-start jardiance. The rise in creatinine previously was in the expected 25% range for a long term kidney protection effect,also helps for BP and sugar control as well. Uacr/upcr and UA is pending from today. No compliocation of CKD including anemia or CKD-MBD.    - Re- start Jardiance 10 mg daily   - BMP in a month   - Avoid NSAID ( Advil)  - Follow up       # Hypertension, Essential  She is on lisnopril 40 mg and hydrochlorothiazide 25 mg po daily. Has not been monitoring BP at home however she remembers told  to have a SBP in 120' in the clinic recently. Today's office /84.  - Encouraged to monitor BP at home   - Jardiance started will help reduce BP   - If BP remained elevated will consider adding CCB.    # HLD   - On statin     # DM2  Diabetic for decades. Has been on metformin, and briefly on jardiance. Both medication discontinued because of kidney dysfucntion. Currently she is on glipizide ,HBA1C - 8.0. No retinopathy thus far.  - Treament per primary       Assessment and plan was discussed with patient and she voiced her understanding and agreement.    Consult:  Berta Witt was seen in consultation at the request of  for CKD management.    Reason for Visit:  Berat Witt is a 83 year old female  CKD from DM, who presents for to establish CKD follow up with Nephrology..    HPI:   Robbingarrett IGGY  Eduar is a 83 year old female with PMH significant for DM2,HPT, HLD and CKD STAGE 3.  She has diabetes and hypertension for quite sometime( decades) not sure exactly how long. She has been receiving glipizide, most recent HBAIC 8.0. She has been receiving lisinopril 40 mg and hydrochlorothiazide 25. She has not monitoring BP at home.Remember told SBP in 120' on recent clinic visit. She does exercise at least walk every day and try the best to eat healthy. No visual problem ,not told to have diabetic eye problem.   Regarding her kidney, creatinine noted elevated since 2018, at 1.34 on 1/2028, progressively raised over the years to 1.6. Creatinine bumped to 1.9 on 11/2023 after the initiation of jardiance ,which later stopped for same reason and creatinine back to baseline 1.6. No proteinuria.no lower leg swelling ,shortness of breathing. She does not have history of recurrent UTIs. No history hematuria, kidney stones. No family history of renal diseases.No history NSAID use.    ROS:   A comprehensive review of systems was obtained and negative, except as noted in the HPI or PMH.    Active Medical Problems:  Patient Active Problem List   Diagnosis     Vitamin D Deficiency     Hyperlipidemia     Hemorrhoids     Spinal stenosis, lumbar region, with neurogenic claudication     Hypokalemia     Essential hypertension with goal blood pressure less than 140/90     Controlled type 2 diabetes mellitus with stage 3 chronic kidney disease, without long-term current use of insulin (H)     Body mass index (bmi) 29.0-29.9, adult     Stage 3b chronic kidney disease (H)     PMH:   Medical record was reviewed and PMH was discussed with patient and noted below.  Past Medical History:   Diagnosis Date     Abscess of right axilla      Colon polyp 9/2004    Benign     Diabetes mellitus (H)     Type 2     Heart murmur April 2004    I/VI systolic ejection murmur     Hemorrhoids      Hyperlipidemia      Hypertension      Hypokalemia       Left ankle sprain 1/12/11     Lumbar back pain      Spinal stenosis      Syncope 2011    single spontaneous episode     PSH:   Past Surgical History:   Procedure Laterality Date     ARTHROSCOPY SHOULDER ROTATOR CUFF REPAIR Bilateral      BACK SURGERY       LUMBAR LAMINECTOMY Bilateral 8/12/2014    Procedure: BILATERAL L3-5 DECOMPRESSION LAMINECTOMY;  Surgeon: Eric Vásquez MD;  [facility];  Service:      OTHER SURGICAL HISTORY Right     fatty tumor excisionright axilla     POLYPECTOMY       Z EXCIS CERV DISK,ONE LEVEL      Description: Laminectomy With Disc Removal;  Recorded: 08/26/2014;     [facility] BENEDICT W/O FACETEC FORAMOT/[facility]KC 1/2 VRT SEG, CERVICAL      Description: Laminectomy Lumbar;  Proc Date: 08/01/2014;  Comments: bilateral L3-L5       Family Hx:   Family History   Problem Relation Age of Onset     Colon Cancer Mother      Coronary Artery Disease Mother      Diabetes Mother      Hypertension Mother      Breast Cancer Sister 60     Brain Cancer Sister      Kidney failure Sister      Diabetes Sister      Diabetes Sister      Breast Cancer Maternal Grandmother 55     Pancreatic Cancer Other      Personal Hx:   Social History     Tobacco Use     Smoking status: Never     Smokeless tobacco: Never   Substance Use Topics     Alcohol use: No       Allergies:  Allergies   Allergen Reactions     Penicillins Hives       Medications:  Current Outpatient Medications   Medication Sig Dispense Refill     alcohol swab prep pads Use to swab area of injection/katharine as directed. 100 each 6     blood glucose (ACCU-CHEK FASTCLIX) lancing device Lancing device to be used with lancets. 1 each 0     blood glucose (NO BRAND SPECIFIED) test strip Use to test blood sugar 2 times daily or as directed. 200 strip 3     blood glucose monitoring (ACCU-CHEK FASTCLIX) lancets Use to test blood sugar 2 times daily before meals 102 each 6     blood glucose monitoring (NO BRAND SPECIFIED) meter device kit Use to test blood  sugar 2 times daily before meals or as directed. 1 kit 0     Continuous Blood Gluc Sensor (DEXCOM G6 SENSOR) MISC Change every 10 days. 3 each 5     Continuous Blood Gluc Sensor (FREESTYLE MODESTO 2 SENSOR) MISC Change every 14 days. 2 each 5     empagliflozin (JARDIANCE) 10 MG TABS tablet Take 1 tablet (10 mg) by mouth daily 90 tablet 3     ferrous sulfate 325 (65 FE) MG tablet Take 1 tablet by mouth daily (with breakfast)       glipiZIDE (GLUCOTROL XL) 5 MG 24 hr tablet Take 1 tablet (5 mg) by mouth daily 90 tablet 3     hydrochlorothiazide (HYDRODIURIL) 25 MG tablet Take 1 tablet (25 mg) by mouth daily       lisinopril (ZESTRIL) 40 MG tablet Take 1 tablet (40 mg) by mouth daily 90 tablet 4     Multiple Vitamins-Minerals (MULTIVITAMIN ADULTS PO) Take by mouth daily       Nutritional Supplements (HIGH-PROTEIN NUTRITIONAL SHAKE) LIQD Take 11 oz by mouth daily This is "[employer] nutrition plan. It has 30 gm of proteins.       potassium chloride ER (KLOR-CON M) 20 MEQ CR tablet Take 1 tablet (20 mEq) by mouth daily       simvastatin (ZOCOR) 10 MG tablet TAKE 1 TABLET AT BEDTIME 90 tablet 1     tiZANidine (ZANAFLEX) 2 MG tablet Take 1 tablet (2 mg) by mouth daily as needed for muscle spasms 10 tablet 0     UNABLE TO FIND Take 1 packet by mouth daily MEDICATION NAME: Java Burn. This is for weight loss. It has a number of vitamins, mineral and extracts. One serving is a packet of 2.5 gm       No current facility-administered medications for this visit.      Vitals:  BP (!) 154/84   Pulse 79   Wt 79 kg (174 lb 3.2 oz)   LMP  (LMP Unknown)   SpO2 100%   BMI 28.12 kg/m      Exam:  GENERAL APPEARANCE: alert and no distress  HENT: mouth without ulcers or lesions  RESP: lungs clear to auscultation - no rales, rhonchi or wheezes  CV: regular rhythm, normal rate, no rub, no murmur  EDEMA: no LE edema bilaterally  ABDOMEN: soft, nondistended, nontender, bowel sounds normal  MS: extremities normal - no gross deformities noted, no  evidence of inflammation in joints, no muscle tenderness  SKIN: no rash    LABS:   CMP  Recent Labs   Lab Test 05/24/24  0719 04/25/24  1539 02/09/24  1311 12/15/23  0828 01/10/22  1319 02/05/21  1031 07/15/20  1647 09/11/19  1145 03/22/19  1556    139 141 143   < > 142 140 143 142   POTASSIUM 3.6 3.9 3.7 4.0   < > 3.7 3.5 3.9 3.9   CHLORIDE 104 105 103 107   < > 106 101 103 101   CO2 26 24 28 26   < > 28 28 30 29   ANIONGAP 13 10 10 10   < > 8 11 10 12   * 152* 202* 112*   < > 105 162* 90 88   BUN 36.1* 42.5* 52.4* 32.3*   < > 17 27 26 28   CR 1.59* 1.73* 1.53* 1.60*   < > 1.52* 1.74* 1.45* 1.39*   GFRESTIMATED 32* 29* 33* 32*   < > 33* 28* 35* 37*   GFRESTBLACK  --   --   --   --   --  40* 34* 42* 44*   ROS 9.7 9.5 9.9 9.7   < > 9.4 9.9 10.1 10.8*    < > = values in this interval not displayed.     Recent Labs   Lab Test 06/09/23  1140 08/03/22  0933 01/10/22  1319 02/05/21  1031   BILITOTAL 0.4 0.5 0.6 0.6   ALKPHOS 53 65 67 54   ALT 12 14 15 11   AST 22 24 20 20     CBC  Recent Labs   Lab Test 05/24/24  0719 04/25/24  1539 09/15/23  1615 02/08/23  1225   HGB 12.6 12.2 13.6 12.6   WBC 4.6 5.4 4.9 5.0   RBC 4.22 4.07 4.53 4.10   HCT 38.6 37.3 42.2 37.8   MCV 92 92 93 92   MCH 29.9 30.0 30.0 30.7   MCHC 32.6 32.7 32.2 33.3   RDW 13.2 13.2 13.9 12.9    174 199 225     URINE STUDIES  Recent Labs   Lab Test 01/11/22  1014   COLOR Yellow   APPEARANCE Clear   URINEGLC Negative   URINEBILI Negative   URINEKETONE Negative   SG 1.015   UBLD Negative   URINEPH 7.0   PROTEIN Negative   UROBILINOGEN 0.2   NITRITE Negative   LEUKEST Large*   RBCU 0-2   WBCU 5-10*     No lab results found.  PTH  Recent Labs   Lab Test 04/25/24  1539   PTHI 38     IRON STUDIES  Recent Labs   Lab Test 02/08/23  1225 08/03/22  0933 07/28/20  1324   IRON 57 52 45    312  --    IRONSAT 18 17  --    VIRGEN  --   --  8*       Sharita Nguyen MD        Attestation signed by Hetal Alfaro MD at 5/28/2024 12:35  PM:  Attestation:  Berta Witt has been evaluated and examined by me, Hetal Alfaro MD. Discussed with Dr. Nguyen and agree with the findings and plan in this note. I have reviewed Medications, Vital Signs, and Labs as well as provider notes.     Date of Service (when I saw the patient): 5/24/2024    Hetal Alfaro MD  [facility]  Department of Medicine  Division of Renal Disease and Hypertension  [facility] Web Console      Again, thank you for allowing me to participate in the care of your patient.      Sincerely,    Sharita Nguyen MD

## 2024-05-24 NOTE — PROGRESS NOTES
Kidney and Blood Pressure Clinic Note    Encounter Date: May 24, 2024     Assessment and Plan:     #HTN, secondary to ***  Baseline blood pressure readings: *** - ***  Current Regimen: ***    #CKD,G3A1  presumed secondary to DKD    # HLD       Baseline creatinine: ***  Electrolytes: sodium and potassium WNL***  Acid/Base: bicarb ***  Bone/Mineral: calcium and phosphorus WNL***  Volume/Blood pressure: euvolemic/normotensive ***  Proteinuria: UPC ***   Anemia: ***    #Renal transplant  Allograft function: serum Cr ***, UOP ***              - therapeutic drug monitoring:                - volume status:               - Electrolyte imbalance:               - Acid-base:       Discussed with Dr ***    Sharita Nguyen MD  Division of Renal Disease and Hypertension  Public[employer]  myairmail  Vocera Web Console      Subjective:     Chief Complaint: ***    History of Present Illness:     Home BP:    - History of Hematuria: ***  - Swelling: ***  - Hx of UTIs: ***  - Hx of stones: ***  - Rashes/Joint pain: ***  - Family hx of kidney disease: ***  - NSAID use: ***    Home BPs: ***  Home blood sugars: ***  Exercise: ***  Diet: ***      Review of Systems:   All organ systems were reviewed and are negative except as noted in the HPI above.    Past Medical History:   Diagnosis Date    Abscess of right axilla     Colon polyp 9/2004    Benign    Diabetes mellitus (H)     Type 2    Heart murmur April 2004    I/VI systolic ejection murmur    Hemorrhoids     Hyperlipidemia     Hypertension     Hypokalemia     Left ankle sprain 1/12/11    Lumbar back pain     Spinal stenosis     Syncope 2011    single spontaneous episode     Past Surgical History:   Procedure Laterality Date    ARTHROSCOPY SHOULDER ROTATOR CUFF REPAIR Bilateral     BACK SURGERY      LUMBAR LAMINECTOMY Bilateral 8/12/2014    Procedure: BILATERAL L3-5 DECOMPRESSION LAMINECTOMY;  Surgeon: Eric Vásquez MD;  [place] [facility] [place]:     OTHER SURGICAL HISTORY Right      fatty tumor excisionright axilla    POLYPECTOMY      [facility] EXCIS CERV DISK,ONE LEVEL      Description: Laminectomy With Disc Removal;  Recorded: 08/26/2014;    [facility] BENEDICT W/O FACETEC FORAMOT/DSKC 1/2 VRT SEG, CERVICAL      Description: Laminectomy Lumbar;  Proc Date: 08/01/2014;  Comments: bilateral L3-L5     Allergies   Allergen Reactions    Penicillins Hives     Patient's Medications   New Prescriptions    No medications on file   Previous Medications    ALCOHOL SWAB PREP PADS    Use to swab area of injection/katharine as directed.    BLOOD GLUCOSE (ACCU-CHEK FASTCLIX) LANCING DEVICE    Lancing device to be used with lancets.    BLOOD GLUCOSE (NO BRAND SPECIFIED) TEST STRIP    Use to test blood sugar 2 times daily or as directed.    BLOOD GLUCOSE MONITORING (ACCU-CHEK FASTCLIX) LANCETS    Use to test blood sugar 2 times daily before meals    BLOOD GLUCOSE MONITORING (NO BRAND SPECIFIED) METER DEVICE KIT    Use to test blood sugar 2 times daily before meals or as directed.    CONTINUOUS BLOOD GLUC SENSOR (DEXCOM G6 SENSOR) MISC    Change every 10 days.    CONTINUOUS BLOOD GLUC SENSOR (FREESTYLE MODESTO 2 SENSOR) MISC    Change every 14 days.    FERROUS SULFATE 325 (65 FE) MG TABLET    Take 1 tablet by mouth daily (with breakfast)    GLIPIZIDE (GLUCOTROL XL) 5 MG 24 HR TABLET    Take 1 tablet (5 mg) by mouth daily    HYDROCHLOROTHIAZIDE (HYDRODIURIL) 25 MG TABLET    Take 1 tablet (25 mg) by mouth daily    LISINOPRIL (ZESTRIL) 40 MG TABLET    Take 1 tablet (40 mg) by mouth daily    MULTIPLE VITAMINS-MINERALS (MULTIVITAMIN ADULTS PO)    Take by mouth daily    NUTRITIONAL SUPPLEMENTS (HIGH-PROTEIN NUTRITIONAL SHAKE) LIQD    Take 11 oz by mouth daily This is [employer] nutrition plan. It has 30 gm of proteins.    POTASSIUM CHLORIDE ER (KLOR-CON M) 20 MEQ CR TABLET    Take 1 tablet (20 mEq) by mouth daily    SIMVASTATIN (ZOCOR) 10 MG TABLET    TAKE 1 TABLET AT BEDTIME    TIZANIDINE (ZANAFLEX) 2 MG TABLET    Take 1 tablet (2 mg) by  mouth daily as needed for muscle spasms    UNABLE TO FIND    Take 1 packet by mouth daily MEDICATION NAME: Java Burn. This is for weight loss. It has a number of vitamins, mineral and extracts. One serving is a packet of 2.5 gm   Modified Medications    No medications on file   Discontinued Medications    No medications on file     Family History   Problem Relation Age of Onset    Colon Cancer Mother     Coronary Artery Disease Mother     Diabetes Mother     Hypertension Mother     Breast Cancer Sister 60    Brain Cancer Sister     Kidney failure Sister     Diabetes Sister     Diabetes Sister     Breast Cancer Maternal Grandmother 55    Pancreatic Cancer Other      Family Status   Relation Name Status    Mo   at age 60's    Sis  (Not Specified)    Sis  (Not Specified)    Sis  (Not Specified)    Sis  (Not Specified)    Sis  (Not Specified)    Sis  (Not Specified)    MGMo  (Not Specified)    OTHER family hx of (Not Specified)   No partnership data on file     Social History     Social History Narrative    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[place]        2 children, 2 grandchildren    Originally from [place]         Social History     Tobacco Use    Smoking status: Never    Smokeless tobacco: Never   Substance Use Topics    Alcohol use: No       Objective:     BP (!) 167/85   Pulse 79   Wt 79 kg (174 lb 3.2 oz)   LMP  (LMP Unknown)   SpO2 100%   BMI 28.12 kg/m      Wt Readings from Last 3 Encounters:   24 79 kg (174 lb 3.2 oz)   24 79.6 kg (175 lb 8 oz)   24 81.6 kg (180 lb)       Constitutional:  NAD  Head: Atraumatic, normocephalic  Eyes:  Anicteric  ENT: MMM  CV: RRR, no m/r/g  Respiratory: CTA bilaterally  GI: Abdomen soft, non-tender  : No tate, no major abnormalities noted  Musculoskeletal:  Extremities warm and well perfused.  No edema***  Skin: No jaundice  Neurologic: Speech normal.  Gait normal.  Strength intact upper and lower extremities.  Psychiatric:  AOx3  Hematologic/lymphatic/immunologic:  No petechiae noted      Results:    Lab on 05/24/2024   Component Date Value Ref Range Status    Sodium 05/24/2024 143  135 - 145 mmol/L Final    Potassium 05/24/2024 3.6  3.4 - 5.3 mmol/L Final    Chloride 05/24/2024 104  98 - 107 mmol/L Final    Carbon Dioxide (CO2) 05/24/2024 26  22 - 29 mmol/L Final    Anion Gap 05/24/2024 13  7 - 15 mmol/L Final    Glucose 05/24/2024 175 (H)  70 - 99 mg/dL Final    Urea Nitrogen 05/24/2024 36.1 (H)  8.0 - 23.0 mg/dL Final    Creatinine 05/24/2024 1.59 (H)  0.51 - 0.95 mg/dL Final    GFR Estimate 05/24/2024 32 (L)  >60 mL/min/1.73m2 Final    Calcium 05/24/2024 9.7  8.8 - 10.2 mg/dL Final    Albumin 05/24/2024 4.2  3.5 - 5.2 g/dL Final    Phosphorus 05/24/2024 3.2  2.5 - 4.5 mg/dL Final    WBC Count 05/24/2024 4.6  4.0 - 11.0 10e3/uL Final    RBC Count 05/24/2024 4.22  3.80 - 5.20 10e6/uL Final    Hemoglobin 05/24/2024 12.6  11.7 - 15.7 g/dL Final    Hematocrit 05/24/2024 38.6  35.0 - 47.0 % Final    MCV 05/24/2024 92  78 - 100 fL Final    MCH 05/24/2024 29.9  26.5 - 33.0 pg Final    MCHC 05/24/2024 32.6  31.5 - 36.5 g/dL Final    RDW 05/24/2024 13.2  10.0 - 15.0 % Final    Platelet Count 05/24/2024 204  150 - 450 10e3/uL Final

## 2024-05-24 NOTE — NURSING NOTE
Chief Complaint   Patient presents with    New Patient       BP (!) 154/84   Pulse 79   Wt 79 kg (174 lb 3.2 oz)   LMP  (LMP Unknown)   SpO2 100%   BMI 28.12 kg/m      Abhay Cunningham on 5/24/2024 at 7:52 AM

## 2024-05-24 NOTE — PROGRESS NOTES
Assessment and Plan:    # CKD G4A1, Secondary to DKD.  Creatinine has been elevated since 2018, progressed from 1.34 to 1.6 since. No proteinuria. Had a rise to 1.9 from 1.6 after she started jardiance,then goes back to 1.6  with withdrawal. Given that she has CKD I would re-start jardiance. The rise in creatinine previously was in the expected 25% range for a long term kidney protection effect,also helps for BP and sugar control as well. Uacr/upcr and UA is pending from today. No compliocation of CKD including anemia or CKD-MBD.    - Re- start Jardiance 10 mg daily   - BMP in a month   - Avoid NSAID ( Advil)  - Follow up       # Hypertension, Essential  She is on lisnopril 40 mg and hydrochlorothiazide 25 mg po daily. Has not been monitoring BP at home however she remembers told  to have a SBP in 120' in the clinic recently. Today's office /84.  - Encouraged to monitor BP at home   - Jardiance started will help reduce BP   - If BP remained elevated will consider adding CCB.    # HLD   - On statin     # DM2  Diabetic for decades. Has been on metformin, and briefly on jardiance. Both medication discontinued because of kidney dysfucntion. Currently she is on glipizide ,HBA1C - 8.0. No retinopathy thus far.  - Treament per primary       Assessment and plan was discussed with patient and she voiced her understanding and agreement.    Consult:  Berta Witt was seen in consultation at the request of  for CKD management.    Reason for Visit:  Berta Witt is a 83 year old female  CKD from DM, who presents for to establish CKD follow up with Nephrology..    HPI:   Berta Witt is a 83 year old female with PMH significant for DM2,HPT, HLD and CKD STAGE 3.  She has diabetes and hypertension for quite sometime( decades) not sure exactly how long. She has been receiving glipizide, most recent HBAIC 8.0. She has been receiving lisinopril 40 mg and hydrochlorothiazide 25. She has not monitoring BP at  home.Remember told SBP in 120' on recent clinic visit. She does exercise at least walk every day and try the best to eat healthy. No visual problem ,not told to have diabetic eye problem.   Regarding her kidney, creatinine noted elevated since 2018, at 1.34 on 1/2028, progressively raised over the years to 1.6. Creatinine bumped to 1.9 on 11/2023 after the initiation of jardiance ,which later stopped for same reason and creatinine back to baseline 1.6. No proteinuria.no lower leg swelling ,shortness of breathing. She does not have history of recurrent UTIs. No history hematuria, kidney stones. No family history of renal diseases.No history NSAID use.    ROS:   A comprehensive review of systems was obtained and negative, except as noted in the HPI or PMH.    Active Medical Problems:  Patient Active Problem List   Diagnosis    Vitamin D Deficiency    Hyperlipidemia    Hemorrhoids    Spinal stenosis, lumbar region, with neurogenic claudication    Hypokalemia    Essential hypertension with goal blood pressure less than 140/90    Controlled type 2 diabetes mellitus with stage 3 chronic kidney disease, without long-term current use of insulin (H)    Body mass index (bmi) 29.0-29.9, adult    Stage 3b chronic kidney disease (H)     PMH:   Medical record was reviewed and PMH was discussed with patient and noted below.  Past Medical History:   Diagnosis Date    Abscess of right axilla     Colon polyp 9/2004    Benign    Diabetes mellitus (H)     Type 2    Heart murmur April 2004    I/VI systolic ejection murmur    Hemorrhoids     Hyperlipidemia     Hypertension     Hypokalemia     Left ankle sprain 1/12/11    Lumbar back pain     Spinal stenosis     Syncope 2011    single spontaneous episode     PSH:   Past Surgical History:   Procedure Laterality Date    ARTHROSCOPY SHOULDER ROTATOR CUFF REPAIR Bilateral     BACK SURGERY      LUMBAR LAMINECTOMY Bilateral 8/12/2014    Procedure: BILATERAL L3-5 DECOMPRESSION LAMINECTOMY;   Surgeon: Eric Vásquez MD;  Location: [facility];  Service:     OTHER SURGICAL HISTORY Right     fatty tumor excisionright axilla    POLYPECTOMY      [facility] EXCIS CERV DISK,ONE LEVEL      Description: Laminectomy With Disc Removal;  Recorded: 08/26/2014;    [facility] BENEDICT W/O FACETEC FORAMOT/DSKC 1/2 VRT SEG, CERVICAL      Description: Laminectomy Lumbar;  Proc Date: 08/01/2014;  Comments: bilateral L3-L5       Family Hx:   Family History   Problem Relation Age of Onset    Colon Cancer Mother     Coronary Artery Disease Mother     Diabetes Mother     Hypertension Mother     Breast Cancer Sister 60    Brain Cancer Sister     Kidney failure Sister     Diabetes Sister     Diabetes Sister     Breast Cancer Maternal Grandmother 55    Pancreatic Cancer Other      Personal Hx:   Social History     Tobacco Use    Smoking status: Never    Smokeless tobacco: Never   Substance Use Topics    Alcohol use: No       Allergies:  Allergies   Allergen Reactions    Penicillins Hives       Medications:  Current Outpatient Medications   Medication Sig Dispense Refill    alcohol swab prep pads Use to swab area of injection/katharine as directed. 100 each 6    blood glucose (ACCU-CHEK FASTCLIX) lancing device Lancing device to be used with lancets. 1 each 0    blood glucose (NO BRAND SPECIFIED) test strip Use to test blood sugar 2 times daily or as directed. 200 strip 3    blood glucose monitoring (ACCU-CHEK FASTCLIX) lancets Use to test blood sugar 2 times daily before meals 102 each 6    blood glucose monitoring (NO BRAND SPECIFIED) meter device kit Use to test blood sugar 2 times daily before meals or as directed. 1 kit 0    Continuous Blood Gluc Sensor (DEXCOM G6 SENSOR) MISC Change every 10 days. 3 each 5    Continuous Blood Gluc Sensor (FREESTYLE MODESTO 2 SENSOR) MISC Change every 14 days. 2 each 5    empagliflozin (JARDIANCE) 10 MG TABS tablet Take 1 tablet (10 mg) by mouth daily 90 tablet 3    ferrous sulfate 325 (65 FE) MG tablet  Take 1 tablet by mouth daily (with breakfast)      glipiZIDE (GLUCOTROL XL) 5 MG 24 hr tablet Take 1 tablet (5 mg) by mouth daily 90 tablet 3    hydrochlorothiazide (HYDRODIURIL) 25 MG tablet Take 1 tablet (25 mg) by mouth daily      lisinopril (ZESTRIL) 40 MG tablet Take 1 tablet (40 mg) by mouth daily 90 tablet 4    Multiple Vitamins-Minerals (MULTIVITAMIN ADULTS PO) Take by mouth daily      Nutritional Supplements (HIGH-PROTEIN NUTRITIONAL SHAKE) LIQD Take 11 oz by mouth daily This is [employer] nutrition plan. It has 30 gm of proteins.      potassium chloride ER (KLOR-CON M) 20 MEQ CR tablet Take 1 tablet (20 mEq) by mouth daily      simvastatin (ZOCOR) 10 MG tablet TAKE 1 TABLET AT BEDTIME 90 tablet 1    tiZANidine (ZANAFLEX) 2 MG tablet Take 1 tablet (2 mg) by mouth daily as needed for muscle spasms 10 tablet 0    UNABLE TO FIND Take 1 packet by mouth daily MEDICATION NAME: Java Burn. This is for weight loss. It has a number of vitamins, mineral and extracts. One serving is a packet of 2.5 gm       No current facility-administered medications for this visit.      Vitals:  BP (!) 154/84   Pulse 79   Wt 79 kg (174 lb 3.2 oz)   LMP  (LMP Unknown)   SpO2 100%   BMI 28.12 kg/m      Exam:  GENERAL APPEARANCE: alert and no distress  HENT: mouth without ulcers or lesions  RESP: lungs clear to auscultation - no rales, rhonchi or wheezes  CV: regular rhythm, normal rate, no rub, no murmur  EDEMA: no LE edema bilaterally  ABDOMEN: soft, nondistended, nontender, bowel sounds normal  MS: extremities normal - no gross deformities noted, no evidence of inflammation in joints, no muscle tenderness  SKIN: no rash    LABS:   CMP  Recent Labs   Lab Test 05/24/24  0719 04/25/24  1539 02/09/24  1311 12/15/23  0828 01/10/22  1319 02/05/21  1031 07/15/20  1647 09/11/19  1145 03/22/19  1556    139 141 143   < > 142 140 143 142   POTASSIUM 3.6 3.9 3.7 4.0   < > 3.7 3.5 3.9 3.9   CHLORIDE 104 105 103 107   < > 106 101 103 101    CO2 26 24 28 26   < > 28 28 30 29   ANIONGAP 13 10 10 10   < > 8 11 10 12   * 152* 202* 112*   < > 105 162* 90 88   BUN 36.1* 42.5* 52.4* 32.3*   < > 17 27 26 28   CR 1.59* 1.73* 1.53* 1.60*   < > 1.52* 1.74* 1.45* 1.39*   GFRESTIMATED 32* 29* 33* 32*   < > 33* 28* 35* 37*   GFRESTBLACK  --   --   --   --   --  40* 34* 42* 44*   ROS 9.7 9.5 9.9 9.7   < > 9.4 9.9 10.1 10.8*    < > = values in this interval not displayed.     Recent Labs   Lab Test 06/09/23  1140 08/03/22  0933 01/10/22  1319 02/05/21  1031   BILITOTAL 0.4 0.5 0.6 0.6   ALKPHOS 53 65 67 54   ALT 12 14 15 11   AST 22 24 20 20     CBC  Recent Labs   Lab Test 05/24/24  0719 04/25/24  1539 09/15/23  1615 02/08/23  1225   HGB 12.6 12.2 13.6 12.6   WBC 4.6 5.4 4.9 5.0   RBC 4.22 4.07 4.53 4.10   HCT 38.6 37.3 42.2 37.8   MCV 92 92 93 92   MCH 29.9 30.0 30.0 30.7   MCHC 32.6 32.7 32.2 33.3   RDW 13.2 13.2 13.9 12.9    174 199 225     URINE STUDIES  Recent Labs   Lab Test 01/11/22  1014   COLOR Yellow   APPEARANCE Clear   URINEGLC Negative   URINEBILI Negative   URINEKETONE Negative   SG 1.015   UBLD Negative   URINEPH 7.0   PROTEIN Negative   UROBILINOGEN 0.2   NITRITE Negative   LEUKEST Large*   RBCU 0-2   WBCU 5-10*     No lab results found.  PTH  Recent Labs   Lab Test 04/25/24  1539   PTHI 38     IRON STUDIES  Recent Labs   Lab Test 02/08/23  1225 08/03/22  0933 07/28/20  1324   IRON 57 52 45    312  --    IRONSAT 18 17  --    VIRGEN  --   --  8*       Sharita Nguyen MD

## 2024-05-24 NOTE — PATIENT INSTRUCTIONS
"It was a pleasure to see you in nephrology clinic today. I've included a brief summary of our discussion and care plan from today's visit below.  _______________________________________________________________________    My recommendations are summarized as follows:  -Keep a Blood Pressure log. Please make sure that you are using a validated blood pressure device (check \"www.validatebp.org\").  -Avoid all NSAID's. Examples include Ibuprofen (Advil, Motrin), naprosyn (Aleve), celebrex among others. Acetaminophen (Tylenol) is ok with maximum dose in 24 hours of 3000mg.  -Healthy lifestyle measures will keep your kidney's functioning at their current best. This includes regular exercise, maintaining a healthy body weight and smoking cessation.   - Start Jardiance 10 mg once daily   - Blood tests in a month  - Follow up in four months    Please return to Nephrology Clinic in 9/24/2024 to review your progress.     Who do I call with any questions after my visit?  There are multiple ways to contact your nephrology care team:    -To schedule or reschedule an appointment, please call [phone].  -Reach out via [employer]. These messages are answered by your nurse or doctor during business hours and typically in 1-2 days. [employer] messages are best for quick questions/clarifications/updates. Frequently, your doctor or nurse will recommend setting up a follow up appointment to address any significant questions/concerns.  -For urgent questions after business hours, you may reach the on-call Nephrology Fellow by contacting the   at [phone].    To schedule imaging:   -Please call [phone]     To schedule your lab appointment at the [facility]:  -Please call [phone]    Sincerely,    Dr.Momina Nguyen   Nephrology fellow   Division of Nephrology and Hypertension  [facility]    "

## 2024-05-29 ENCOUNTER — TELEPHONE (OUTPATIENT)
Dept: INTERNAL MEDICINE | Facility: CLINIC | Age: 84
End: 2024-05-29
Payer: COMMERCIAL

## 2024-05-29 NOTE — TELEPHONE ENCOUNTER
Derick Carreon,  Thank you for letting me know.  Looks like nephrology is aware of her creatinine jump with previous trial of Jardiance. We can start on the low dose and monitor , have repeat renal function in 2 months and if creatinine gets too high, get in contact with nephrology for help.     Please educate pt and Jardiance effect and expected rise on creatinine.  NH

## 2024-05-29 NOTE — TELEPHONE ENCOUNTER
Patient had an appointment with nephrology on 05/24/2024, and they wanted to re-start Jardiance. Jardiance was stopped (PCP) due to increased creatinine levels. We have an appointment with Patient tomorrow to discuss about these changes.         Velma Medina, PharmD     Medication Therapy Management (MTM) Pharmacist     [phone]     [email or web address]

## 2024-05-30 ENCOUNTER — OFFICE VISIT (OUTPATIENT)
Dept: PHARMACY | Facility: CLINIC | Age: 84
End: 2024-05-30
Payer: COMMERCIAL

## 2024-05-30 DIAGNOSIS — N18.30 CONTROLLED TYPE 2 DIABETES MELLITUS WITH STAGE 3 CHRONIC KIDNEY DISEASE, WITHOUT LONG-TERM CURRENT USE OF INSULIN (H): Primary | ICD-10-CM

## 2024-05-30 DIAGNOSIS — E11.22 CONTROLLED TYPE 2 DIABETES MELLITUS WITH STAGE 3 CHRONIC KIDNEY DISEASE, WITHOUT LONG-TERM CURRENT USE OF INSULIN (H): Primary | ICD-10-CM

## 2024-05-30 DIAGNOSIS — N18.32 STAGE 3B CHRONIC KIDNEY DISEASE (H): ICD-10-CM

## 2024-05-30 PROCEDURE — 99207 PR NO CHARGE LOS: CPT

## 2024-05-30 RX ORDER — BLOOD-GLUCOSE SENSOR
1 EACH MISCELLANEOUS
Qty: 2 EACH | Refills: 11 | Status: SHIPPED | OUTPATIENT
Start: 2024-05-30 | End: 2024-09-06

## 2024-05-30 NOTE — PATIENT INSTRUCTIONS
"Recommendations from today's MTM visit:                                                      MTM (medication therapy management) is a service provided by a clinical pharmacist designed to help you get the most of out of your medicines.   Today we reviewed what your medicines are for, how to know if they are working, that your medicines are safe and how to make your medicine regimen as easy as possible.      Start taking Jardiance 10 mg daily, and check BMP in two weeks  Please considering checking your BP as you have been  In a month or so we might talk about GLP - agonists in the setting of renal disease   3. PharmD to do PAP for jardiance     Follow-up: Due on 06/13/2024 for lab, and see MTM on the 06/14    It was great speaking with you today.  I value your experience and would be very thankful for your time in providing feedback in our clinic survey. In the next few days, you may receive an email or text message from [facility] [employer] with a link to a survey related to your  clinical pharmacist.\"     To schedule another MTM appointment, please call the clinic directly or you may call the MTM scheduling line at [phone].    My Clinical Pharmacist's contact information:                                                      Please feel free to contact me with any questions or concerns you have.        Velma Medina, PharmD     Medication Therapy Management (MTM) Pharmacist     [phone]     [email or web address]     [facility]    "

## 2024-05-30 NOTE — PROGRESS NOTES
Clinical Pharmacy Consult:                                                    Berta Witt is a 83 year old female seen for a clinical pharmacist consult.  She was referred to me from      Reason for Consult: Renal medications and also weight loss medications    Discussion: Patient is here to discuss about jardiance use in the setting of renal disease. Nephrology wanted patient to take Jardiance, but considering she used it before and caused her creatinine to increase, she is worried to use it again. Will discuss its benefits and side effects.  She has been weighing herself every week, and she has lost about 4 pounds.     In a new sub-analysis of the landmark EMPEROR-Preserved  phase III trial, Jardiance reduced the combined relative risk of cardiovascular death and hospitalization for heart failure and slowed kidney function decline, lowers blood sugar levels, and can help with weight loss.     To reduce the risk of sustained decline in eGFR, end-stage kidney disease, cardiovascular death, and hospitalization in adults with chronic kidney disease at risk of progression.        Results: Dexcom G6/G7 sensors 30 days $74.74 Freestyle marta 2 sensors coming back refill too soon Freestyle marta 3 sensors 28 days $26.72    Plan:  Start taking Jardiance 10 mg daily, and check BMP in two weeks  Please considering checking your BP as you have been  In a month or so we might talk about GLP - agonists in the setting of renal disease   3. PharmD to do PAP for jardiance       Velma Medina, PharmD     Medication Therapy Management (MTM) Pharmacist     [phone]     [email or web address]

## 2024-06-13 ENCOUNTER — TELEPHONE (OUTPATIENT)
Dept: INTERNAL MEDICINE | Facility: CLINIC | Age: 84
End: 2024-06-13

## 2024-06-13 ENCOUNTER — LAB (OUTPATIENT)
Dept: LAB | Facility: CLINIC | Age: 84
End: 2024-06-13
Payer: COMMERCIAL

## 2024-06-13 DIAGNOSIS — E11.22 CONTROLLED TYPE 2 DIABETES MELLITUS WITH STAGE 3 CHRONIC KIDNEY DISEASE, WITHOUT LONG-TERM CURRENT USE OF INSULIN (H): ICD-10-CM

## 2024-06-13 DIAGNOSIS — N18.30 CONTROLLED TYPE 2 DIABETES MELLITUS WITH STAGE 3 CHRONIC KIDNEY DISEASE, WITHOUT LONG-TERM CURRENT USE OF INSULIN (H): ICD-10-CM

## 2024-06-13 DIAGNOSIS — N18.32 STAGE 3B CHRONIC KIDNEY DISEASE (H): ICD-10-CM

## 2024-06-13 PROCEDURE — 80048 BASIC METABOLIC PNL TOTAL CA: CPT

## 2024-06-13 PROCEDURE — 80061 LIPID PANEL: CPT

## 2024-06-13 PROCEDURE — 36415 COLL VENOUS BLD VENIPUNCTURE: CPT

## 2024-06-13 NOTE — TELEPHONE ENCOUNTER
Patient calling to find out her most recent Hemoglobin A1C result. Upon reviewing her chart, the most recent result is on 4/25/24. Relayed providers message to patient. Patient verbalized understanding.

## 2024-06-14 ENCOUNTER — OFFICE VISIT (OUTPATIENT)
Dept: INTERNAL MEDICINE | Facility: CLINIC | Age: 84
End: 2024-06-14
Payer: COMMERCIAL

## 2024-06-14 VITALS
BODY MASS INDEX: 27.19 KG/M2 | DIASTOLIC BLOOD PRESSURE: 60 MMHG | HEIGHT: 66 IN | RESPIRATION RATE: 19 BRPM | HEART RATE: 71 BPM | OXYGEN SATURATION: 97 % | SYSTOLIC BLOOD PRESSURE: 128 MMHG | WEIGHT: 169.2 LBS | TEMPERATURE: 98.1 F

## 2024-06-14 DIAGNOSIS — R01.1 HEART MURMUR: ICD-10-CM

## 2024-06-14 DIAGNOSIS — Z00.00 ENCOUNTER FOR MEDICARE ANNUAL WELLNESS EXAM: Primary | ICD-10-CM

## 2024-06-14 DIAGNOSIS — Z12.31 ENCOUNTER FOR SCREENING MAMMOGRAM FOR BREAST CANCER: ICD-10-CM

## 2024-06-14 DIAGNOSIS — E11.22 CONTROLLED TYPE 2 DIABETES MELLITUS WITH STAGE 3 CHRONIC KIDNEY DISEASE, WITHOUT LONG-TERM CURRENT USE OF INSULIN (H): ICD-10-CM

## 2024-06-14 DIAGNOSIS — I10 PRIMARY HYPERTENSION: ICD-10-CM

## 2024-06-14 DIAGNOSIS — N18.30 CONTROLLED TYPE 2 DIABETES MELLITUS WITH STAGE 3 CHRONIC KIDNEY DISEASE, WITHOUT LONG-TERM CURRENT USE OF INSULIN (H): ICD-10-CM

## 2024-06-14 DIAGNOSIS — E55.9 VITAMIN D DEFICIENCY: ICD-10-CM

## 2024-06-14 DIAGNOSIS — N18.32 STAGE 3B CHRONIC KIDNEY DISEASE (H): ICD-10-CM

## 2024-06-14 LAB
ANION GAP SERPL CALCULATED.3IONS-SCNC: 14 MMOL/L (ref 7–15)
BUN SERPL-MCNC: 51.5 MG/DL (ref 8–23)
CALCIUM SERPL-MCNC: 10.1 MG/DL (ref 8.8–10.2)
CHLORIDE SERPL-SCNC: 104 MMOL/L (ref 98–107)
CHOLEST SERPL-MCNC: 162 MG/DL
CREAT SERPL-MCNC: 1.88 MG/DL (ref 0.51–0.95)
DEPRECATED HCO3 PLAS-SCNC: 24 MMOL/L (ref 22–29)
EGFRCR SERPLBLD CKD-EPI 2021: 26 ML/MIN/1.73M2
GLUCOSE SERPL-MCNC: 113 MG/DL (ref 70–99)
HDLC SERPL-MCNC: 54 MG/DL
LDLC SERPL CALC-MCNC: 76 MG/DL
NONHDLC SERPL-MCNC: 108 MG/DL
POTASSIUM SERPL-SCNC: 3.8 MMOL/L (ref 3.4–5.3)
SODIUM SERPL-SCNC: 142 MMOL/L (ref 135–145)
TRIGL SERPL-MCNC: 162 MG/DL

## 2024-06-14 PROCEDURE — 99214 OFFICE O/P EST MOD 30 MIN: CPT | Mod: 25 | Performed by: INTERNAL MEDICINE

## 2024-06-14 PROCEDURE — G0439 PPPS, SUBSEQ VISIT: HCPCS | Performed by: INTERNAL MEDICINE

## 2024-06-14 SDOH — HEALTH STABILITY: PHYSICAL HEALTH: ON AVERAGE, HOW MANY DAYS PER WEEK DO YOU ENGAGE IN MODERATE TO STRENUOUS EXERCISE (LIKE A BRISK WALK)?: 6 DAYS

## 2024-06-14 ASSESSMENT — SOCIAL DETERMINANTS OF HEALTH (SDOH): HOW OFTEN DO YOU GET TOGETHER WITH FRIENDS OR RELATIVES?: MORE THAN THREE TIMES A WEEK

## 2024-06-14 NOTE — PATIENT INSTRUCTIONS
Calcium needs: 3-4 portions of low fat dairy a day.    2. Have mammogram done    3. Will check cholesterol today.

## 2024-06-14 NOTE — PROGRESS NOTES
"Preventive Care Visit  [facility]  Dianna Lucero MD, Internal Medicine  Jun 14, 2024      Assessment & Plan     Encounter for Medicare annual wellness exam  She had recent blood work done which was reviewed, A1c 2 months ago was 8 but since then she started on Jardiance at home fasting sugars have been at goal, will continue to monitor.  She is due for cholesterol check.    Encounter for screening mammogram for breast cancer  Due to family history of breast cancer in her sister, will continue screening for couple more years  - MA Screen Bilateral w/Juan; Future    Stage 3b chronic kidney disease (H)  She is followed by nephrologist, creatinine is around 1.6, since creatinine has been slowly worsening over the years, nephrology did recommend retrial of Jardiance.  She started Jardiance 10 mg a months ago and tolerates it well, recent creatinine was 1.9, potassium level is good.  She is on hydrochlorothiazide and potassium supplement    Primary hypertension  Well-controlled on lisinopril hydrochlorothiazide and potassium    Controlled type 2 diabetes mellitus with stage 3 chronic kidney disease, without long-term current use of insulin (H)  Due to renal insufficiency she is unable to be on metformin, she does take glipizide and Jardiance was added a month ago, will repeat A1c again in 2 months, recent sugars at home have been 95-1 30.  She is up-to-date on eye exam, monofilament test today was negative  - Lipid panel reflex to direct LDL Non-fasting; Future    Vitamin D Deficiency  Recent vitamin D level was good    Heart murmur  Echocardiogram was done last year which did not show any significant valvular abnormalities.  She does have mild aortic regurgitation without stenosis.      BMI  Estimated body mass index is 27.31 kg/m  as calculated from the following:    Height as of this encounter: 1.676 m (5' 6\").    Weight as of this encounter: 76.7 kg (169 lb 3.2 oz). "       Counseling  Appropriate preventive services were discussed with this patient, including applicable screening as appropriate for fall prevention, nutrition, physical activity, Tobacco-use cessation, weight loss and cognition.  Checklist reviewing preventive services available has been given to the patient.  Reviewed patient's diet, addressing concerns and/or questions.   The patient was instructed to see the dentist every 6 months.   The patient was provided with written information regarding signs of hearing loss.       Laura Hampton is a 83 year old, presenting for the following:  Annual Visit        6/14/2024    10:27 AM   Additional Questions   Roomed by Deandre BRYANT RN         Health Care Directive  Patient does not have a Health Care Directive or Living Will: Discussed advance care planning with patient; information given to patient to review.    BRITTANY Hampton is an 83-year-old patient with history of chronic renal insufficiency, type 2 diabetes, hypertension, hyperlipidemia, spinal stenosis, regular blood donations and intermittent anemia, mild aortic stenosis, she is currently here for wellness exam.    She has been complaining family reunion which she is in 2 weeks out of state.  She is a little bit stressed but looks forward to that event.  She did have COVID booster in the spring.    Due to her worsening renal function her nephrologist did put her back on Jardiance.  He did note that when I put her on Jardiance her creatinine did increase from 1.6-1.9 which is expected worsening in creatinine, due to long-term benefits a second trial was recommended.  Currently she has been on Jardiance 10 mg for the last month in addition to glipizide.  Sugars at home have been better: 95-1 30, she lost 5 pounds, she does try to walk daily for at least an hour a day.  Bedtime sugars are around 200.    Blood pressure is well-controlled on lisinopril hydrochlorothiazide and potassium.      She sees Dr. Fischer at  [facility] for annual diabetic eye exam and has not needed new glasses recently.  No problems with hearing, no recent falls or balance issues.        6/14/2024   General Health   How would you rate your overall physical health? Good   Feel stress (tense, anxious, or unable to sleep) Not at all         6/14/2024   Nutrition   Diet: Low salt    Diabetic         6/14/2024   Exercise   Days per week of moderate/strenous exercise 6 days         6/14/2024   Social Factors   Frequency of gathering with friends or relatives More than three times a week   Worry food won't last until get money to buy more No   Food not last or not have enough money for food? No   Do you have housing?  Yes   Are you worried about losing your housing? No   Lack of transportation? No   Unable to get utilities (heat,electricity)? No         6/14/2024   Fall Risk   Fallen 2 or more times in the past year? No    No   Trouble with walking or balance? No    No          6/14/2024   Activities of Daily Living- Home Safety   Needs help with the following daily activites None of the above   Safety concerns in the home None of the above         6/14/2024   Dental   Dentist two times every year? (!) NO         6/14/2024   Hearing Screening   Hearing concerns? (!) IT'S HARD TO FOLLOW A CONVERSATION IN A NOISY RESTAURANT OR CROWDED ROOM.         6/14/2024   Driving Risk Screening   Patient/family members have concerns about driving No         6/14/2024   General Alertness/Fatigue Screening   Have you been more tired than usual lately? No         6/14/2024   Urinary Incontinence Screening   Bothered by leaking urine in past 6 months No         6/14/2024   TB Screening   Were you born outside of the [place]? No         Today's PHQ-2 Score:       6/14/2024    10:18 AM   PHQ-2 ( 1999 [employer])   Q1: Little interest or pleasure in doing things 0   Q2: Feeling down, depressed or hopeless 0   PHQ-2 Score 0   Q1: Little interest or pleasure in doing things Not  at all   Q2: Feeling down, depressed or hopeless Not at all   PHQ-2 Score 0           6/14/2024   Substance Use   Alcohol more than 3/day or more than 7/wk Not Applicable   Do you have a current opioid prescription? No   How severe/bad is pain from 1 to 10? 0/10 (No Pain)   Do you use any other substances recreationally? No     Social History     Tobacco Use    Smoking status: Never    Smokeless tobacco: Never   Vaping Use    Vaping status: Never Used   Substance Use Topics    Alcohol use: No    Drug use: No           2/16/2023   LAST FHS-7 RESULTS   1st degree relative breast or ovarian cancer Yes    Yes   Any relative bilateral breast cancer No   Any male have breast cancer No   Any ONE woman have BOTH breast AND ovarian cancer No   Any woman with breast cancer before 50yrs No   2 or more relatives with breast AND/OR ovarian cancer No    No   2 or more relatives with breast AND/OR bowel cancer Yes   Reviewed and updated as needed this visit by Provider                    Current providers sharing in care for this patient include:  Patient Care Team:  Dianna Lucero MD as PCP - General (Internal Medicine)  Dianna Lucero MD as Assigned PCP  Velma Medina RPH as Pharmacist  Velma Medina RPH as Assigned MTM Pharmacist    The following health maintenance items are reviewed in Epic and correct as of today:  Health Maintenance   Topic Date Due    ANNUAL REVIEW OF HM ORDERS  08/06/2022    DIABETIC FOOT EXAM  08/03/2023    LIPID  06/09/2024    MEDICARE ANNUAL WELLNESS VISIT  06/09/2024    A1C  10/25/2024    MICROALBUMIN  11/24/2024    EYE EXAM  02/12/2025    HEMOGLOBIN  05/24/2025    BMP  06/13/2025    FALL RISK ASSESSMENT  06/14/2025    ADVANCE CARE PLANNING  06/09/2028    DTAP/TDAP/TD IMMUNIZATION (3 - Td or Tdap) 06/09/2033    DEXA  06/20/2038    PARATHYROID  Completed    PHOSPHORUS  Completed    PHQ-2 (once per calendar year)  Completed    INFLUENZA VACCINE  Completed    Pneumococcal  "Vaccine: 65+ Years  Completed    URINALYSIS  Completed    ALK PHOS  Completed    ZOSTER IMMUNIZATION  Completed    RSV VACCINE (Pregnancy & 60+)  Completed    COVID-19 Vaccine  Completed    IPV IMMUNIZATION  Aged Out    HPV IMMUNIZATION  Aged Out    MENINGITIS IMMUNIZATION  Aged Out    RSV MONOCLONAL ANTIBODY  Aged Out       Review of systems: As above, no chest pains palpitations or shortness of breath with exertion, no constipation or diarrhea or blood in the stool, no urinary problems or blood in the urine no vaginal spotting or bleeding.     Objective    Exam  /60 (BP Location: Left arm, Patient Position: Sitting, Cuff Size: Adult Regular)   Pulse 71   Temp 98.1  F (36.7  C) (Tympanic)   Resp 19   Ht 1.676 m (5' 6\")   Wt 76.7 kg (169 lb 3.2 oz)   LMP  (LMP Unknown)   SpO2 97%   BMI 27.31 kg/m     Estimated body mass index is 27.31 kg/m  as calculated from the following:    Height as of this encounter: 1.676 m (5' 6\").    Weight as of this encounter: 76.7 kg (169 lb 3.2 oz).    Physical Exam  General: well appearing female, alert and oriented x3  EYES: Eyelids, conjunctiva, and sclera were normal. Pupils were normal.   HEAD, EARS, NOSE, MOUTH, AND THROAT: no cervical LAD, no thyromegaly or nodules appreciated. TMs are visualized and normal, oropharynx is clear.  RESPIRATORY: respirations non labored, CTA bl, no wheezes, rales, no forced expiratory wheezing.  CARDIOVASCULAR: Heart rate and rhythm were normal.  Systolic ejection murmur right and left sternal border. There was no peripheral edema. No carotid bruits.  GASTROINTESTINAL: Positive bowel sounds, abdomen is soft, non tender, non distended.     MUSCULOSKELETAL: Muscle mass was normal for age. No joint synovitis or deformity.  LYMPHATIC: There were no enlarged nodes palpable.  SKIN/HAIR/NAILS: Skin color was normal.  No rashes.  NEUROLOGIC: The patient was alert and oriented.  Speech was normal.  There is no facial asymmetry.   PSYCHIATRIC:  " Mood and affect were normal.   Monofilament test is negative.  Breast exam: No axillary lymphadenopathy, breast masses or skin changes appreciated.        6/14/2024   Mini Cog   Clock Draw Score 2 Normal   3 Item Recall 3 objects recalled   Mini Cog Total Score 5              Signed Electronically by: Dianna Lucero MD

## 2024-06-18 ENCOUNTER — TELEPHONE (OUTPATIENT)
Dept: NEPHROLOGY | Facility: CLINIC | Age: 84
End: 2024-06-18
Payer: COMMERCIAL

## 2024-06-18 NOTE — TELEPHONE ENCOUNTER
Patient confirmed scheduled appointment:     Date: 10/9  Time: 2 PM  Visit type: Return nephrology  Provider: Dr. Nguyen  [place] [facility] [place]

## 2024-06-19 ENCOUNTER — OFFICE VISIT (OUTPATIENT)
Dept: PHARMACY | Facility: CLINIC | Age: 84
End: 2024-06-19
Payer: COMMERCIAL

## 2024-06-19 DIAGNOSIS — N18.30 CONTROLLED TYPE 2 DIABETES MELLITUS WITH STAGE 3 CHRONIC KIDNEY DISEASE, WITHOUT LONG-TERM CURRENT USE OF INSULIN (H): Primary | ICD-10-CM

## 2024-06-19 DIAGNOSIS — E11.22 CONTROLLED TYPE 2 DIABETES MELLITUS WITH STAGE 3 CHRONIC KIDNEY DISEASE, WITHOUT LONG-TERM CURRENT USE OF INSULIN (H): Primary | ICD-10-CM

## 2024-06-19 DIAGNOSIS — I10 ESSENTIAL HYPERTENSION WITH GOAL BLOOD PRESSURE LESS THAN 140/90: ICD-10-CM

## 2024-06-19 PROCEDURE — 99607 MTMS BY PHARM ADDL 15 MIN: CPT

## 2024-06-19 PROCEDURE — 99606 MTMS BY PHARM EST 15 MIN: CPT

## 2024-06-19 NOTE — PROGRESS NOTES
Medication Therapy Management (MTM) Encounter    ASSESSMENT:                            Medication Adherence/Access: No issues identified.She has a container that she puts her medications for the next day. She got a pillbox, but has not used it yet.     Diabetes Type II:  A1C slightly above goal of < 8. Most of the CGM readings are within the target range (70 - 180) 83%.   Considering patient's low renal function appropriate to make some lifestyle changes such as drinking enough water and eating fruits that have antioxidant activities ( the cherries and berries).  Even though renal function was declined on recent Jardiance start reasonable to continue it since it is beneficial over the long run.    Hypertension: In clinic BP mostly within goal of < 130/80. Advised patient to check her BP and report to writer. Considering controlled BP reasonable to continue current medications.     PLAN:                            Continue taking current medications as prescribed     Follow-up: Due on 07/19/2024 @ 08:30 AM    SUBJECTIVE/OBJECTIVE:                          Berta Witt is a 83 year old female seen for a follow visit.      Reason for visit: Medication review and Diabetes.    Allergies/ADRs: Reviewed in chart  Past Medical History: Reviewed in chart  Tobacco: She reports that she has never smoked. She has never used smokeless tobacco.    Medication Adherence/Access: She has a container that she puts her medications for the next day. She has a pillbox, but has not used it yet.      Diabetes  Type II: Currently taking Jardiance, and glipizide XL 5 mg daily,  .  Patient does not like injecting insulin.   Not taking aspirin due age  Blood sugar monitoring: never  Current diabetes symptoms: none  Diet/Exercise: She said she eats healthy; she does not eat a lot of proteins and she eats a lot fruits and veggies. She said she is really conscious of what she eats. She never had a sweat stuff, but ate once at  [employer]. If she  has sweats at home she would eat them though. She adds honey in her coffee in the morning, but not anymore. Noted she was eating a little bit more sugary food during Christmas and new year.      Eye exam is up to date  Foot exam: due  Urine Albumin:   Lab Results   Component Value Date    UMALCR  05/24/2024      Comment:      Unable to calculate, urine albumin and/or urine creatinine is outside detectable limits.  Microalbuminuria is defined as an albumin:creatinine ratio of 17 to 299 for males and 25 to 299 for females. A ratio of albumin:creatinine of 300 or higher is indicative of overt proteinuria.  Due to biologic variability, positive results should be confirmed by a second, first-morning random or 24-hour timed urine specimen. If there is discrepancy, a third specimen is recommended. When 2 out of 3 results are in the microalbuminuria range, this is evidence for incipient nephropathy and warrants increased efforts at glucose control, blood pressure control, and institution of therapy with an angiotensin-converting-enzyme (ACE) inhibitor (if the patient can tolerate it).        Lab Results   Component Value Date    A1C 8.0 (H) 04/25/2024             Ozempic 28 days $47.00 Mounjaro 28 days $47.00 trulicity 28 days $47.00 victoza not covered Bydureon 28 days $47.00 rybelsus 30 days $47.00    Hypertension :    Lisinopril 40 mg daily    Hydrochlorothiazide 25 mg daily    Said her blood pressure has not been high. She said the last time she checked her BP was normal. She tries to walk 5 thousand steps a day.   Patient reports no current medication side effects  Patient self monitors blood pressure.  Home BP monitoring sometimes . She has been checking her BP readings, but forgot to bring the readings today. BP readings are a bit high these days, and we should consider adding a new med or adjust the medication the patient is taking.     Manual BP:  120/70, 124/68, and 118/66    BP Readings from Last 3 Encounters:    06/14/24 128/60   05/24/24 (!) 154/84   05/09/24 118/66     Pulse Readings from Last 3 Encounters:   06/14/24 71   05/24/24 79   04/25/24 64     BP: 130/60      Today's Vitals: LMP  (LMP Unknown)   ----------------      I spent 30 minutes with this patient today. All changes were made via collaborative practice agreement with Dianna Lucero MD. A copy of the visit note was provided to the patient's provider(s).    A summary of these recommendations was given to the patient.       Medication Therapy Recommendations  No medication therapy recommendations to display     Velma Medina, PharmD     Medication Therapy Management (MTM) Pharmacist     [phone]     [email or web address]     [facility]

## 2024-06-19 NOTE — PATIENT INSTRUCTIONS
"Recommendations from today's MTM visit:                                                      MTM (medication therapy management) is a service provided by a clinical pharmacist designed to help you get the most of out of your medicines.   Today we reviewed what your medicines are for, how to know if they are working, that your medicines are safe and how to make your medicine regimen as easy as possible.      Continue taking current medications as prescribed     Follow-up: Due on 07/19/2024 @ 08:30 AM    It was great speaking with you today.  I value your experience and would be very thankful for your time in providing feedback in our clinic survey. In the next few days, you may receive an email or text message from [facility] with a link to a survey related to your  clinical pharmacist.\"     To schedule another MTM appointment, please call the clinic directly or you may call the MTM scheduling line at [phone].    My Clinical Pharmacist's contact information:                                                      Please feel free to contact me with any questions or concerns you have.        Velma Medina, PharmD     Medication Therapy Management (MTM) Pharmacist     [phone]     [email or web address]    "

## 2024-06-29 DIAGNOSIS — E11.22 CONTROLLED TYPE 2 DIABETES MELLITUS WITH STAGE 3 CHRONIC KIDNEY DISEASE, WITHOUT LONG-TERM CURRENT USE OF INSULIN (H): ICD-10-CM

## 2024-06-29 DIAGNOSIS — N18.30 CONTROLLED TYPE 2 DIABETES MELLITUS WITH STAGE 3 CHRONIC KIDNEY DISEASE, WITHOUT LONG-TERM CURRENT USE OF INSULIN (H): ICD-10-CM

## 2024-07-16 ENCOUNTER — ANCILLARY PROCEDURE (OUTPATIENT)
Dept: MAMMOGRAPHY | Facility: CLINIC | Age: 84
End: 2024-07-16
Attending: INTERNAL MEDICINE
Payer: COMMERCIAL

## 2024-07-16 DIAGNOSIS — Z12.31 ENCOUNTER FOR SCREENING MAMMOGRAM FOR BREAST CANCER: ICD-10-CM

## 2024-07-16 PROCEDURE — 77063 BREAST TOMOSYNTHESIS BI: CPT | Mod: TC | Performed by: STUDENT IN AN ORGANIZED HEALTH CARE EDUCATION/TRAINING PROGRAM

## 2024-07-16 PROCEDURE — 77067 SCR MAMMO BI INCL CAD: CPT | Mod: TC | Performed by: STUDENT IN AN ORGANIZED HEALTH CARE EDUCATION/TRAINING PROGRAM

## 2024-07-19 ENCOUNTER — OFFICE VISIT (OUTPATIENT)
Dept: PHARMACY | Facility: CLINIC | Age: 84
End: 2024-07-19
Payer: COMMERCIAL

## 2024-07-19 ENCOUNTER — LAB (OUTPATIENT)
Dept: LAB | Facility: CLINIC | Age: 84
End: 2024-07-19
Payer: COMMERCIAL

## 2024-07-19 VITALS — DIASTOLIC BLOOD PRESSURE: 60 MMHG | SYSTOLIC BLOOD PRESSURE: 132 MMHG

## 2024-07-19 DIAGNOSIS — I10 ESSENTIAL HYPERTENSION WITH GOAL BLOOD PRESSURE LESS THAN 140/90: ICD-10-CM

## 2024-07-19 DIAGNOSIS — E11.22 CONTROLLED TYPE 2 DIABETES MELLITUS WITH STAGE 3 CHRONIC KIDNEY DISEASE, WITHOUT LONG-TERM CURRENT USE OF INSULIN (H): ICD-10-CM

## 2024-07-19 DIAGNOSIS — E11.22 CONTROLLED TYPE 2 DIABETES MELLITUS WITH STAGE 3 CHRONIC KIDNEY DISEASE, WITHOUT LONG-TERM CURRENT USE OF INSULIN (H): Primary | ICD-10-CM

## 2024-07-19 DIAGNOSIS — N18.30 CONTROLLED TYPE 2 DIABETES MELLITUS WITH STAGE 3 CHRONIC KIDNEY DISEASE, WITHOUT LONG-TERM CURRENT USE OF INSULIN (H): ICD-10-CM

## 2024-07-19 DIAGNOSIS — N18.30 CONTROLLED TYPE 2 DIABETES MELLITUS WITH STAGE 3 CHRONIC KIDNEY DISEASE, WITHOUT LONG-TERM CURRENT USE OF INSULIN (H): Primary | ICD-10-CM

## 2024-07-19 LAB
ANION GAP SERPL CALCULATED.3IONS-SCNC: 11 MMOL/L (ref 7–15)
BUN SERPL-MCNC: 56 MG/DL (ref 8–23)
CALCIUM SERPL-MCNC: 9.4 MG/DL (ref 8.8–10.4)
CHLORIDE SERPL-SCNC: 103 MMOL/L (ref 98–107)
CREAT SERPL-MCNC: 1.7 MG/DL (ref 0.51–0.95)
EGFRCR SERPLBLD CKD-EPI 2021: 29 ML/MIN/1.73M2
GLUCOSE SERPL-MCNC: 156 MG/DL (ref 70–99)
HCO3 SERPL-SCNC: 25 MMOL/L (ref 22–29)
POTASSIUM SERPL-SCNC: 4 MMOL/L (ref 3.4–5.3)
SODIUM SERPL-SCNC: 139 MMOL/L (ref 135–145)

## 2024-07-19 PROCEDURE — 36415 COLL VENOUS BLD VENIPUNCTURE: CPT

## 2024-07-19 PROCEDURE — 99606 MTMS BY PHARM EST 15 MIN: CPT

## 2024-07-19 PROCEDURE — 99607 MTMS BY PHARM ADDL 15 MIN: CPT

## 2024-07-19 PROCEDURE — 80048 BASIC METABOLIC PNL TOTAL CA: CPT

## 2024-07-19 NOTE — PATIENT INSTRUCTIONS
"Recommendations from today's MTM visit:                                                      MTM (medication therapy management) is a service provided by a clinical pharmacist designed to help you get the most of out of your medicines.   Today we reviewed what your medicines are for, how to know if they are working, that your medicines are safe and how to make your medicine regimen as easy as possible.      PharmD to order labs and send patient to the lab for blood work   Will check kidney function     Follow-up: Due on 09/03/2024    It was great speaking with you today.  I value your experience and would be very thankful for your time in providing feedback in our clinic survey. In the next few days, you may receive an email or text message from [facility] [employer] with a link to a survey related to your  clinical pharmacist.\"     To schedule another MTM appointment, please call the clinic directly or you may call the MTM scheduling line at [phone].    My Clinical Pharmacist's contact information:                                                      Please feel free to contact me with any questions or concerns you have.        Velma Medina, PharmD     Medication Therapy Management (MTM) Pharmacist     [phone]     [email or web address]     [facility]  "

## 2024-07-19 NOTE — PROGRESS NOTES
Medication Therapy Management (MTM) Encounter    ASSESSMENT:                            Medication Adherence/Access: No issues identified.She has a container that she puts her medications for the next day. She got a pillbox, but has not used it yet.     Diabetes Type II:  A1C slightly above goal of < 8. Most of the CGM readings are within the target range (70 - 180) 82%.   Considering patient's low renal function appropriate to make some lifestyle changes such as drinking enough water and eating fruits that have antioxidant activities ( the cherries and berries).  Even though renal function was declined on recent Jardiance start reasonable to continue it since it is beneficial over the long run.    Hypertension: In clinic BP mostly within goal of < 130/80. Advised patient to check her BP and report to writer. Considering controlled BP reasonable to continue current medications.     PLAN:                            PharmD to order labs and send patient to the lab for blood work   Will check kidney function     Follow-up: Due on 09/03/2024    SUBJECTIVE/OBJECTIVE:                          Berta Witt is a 83 year old female seen for a follow visit.      Reason for visit: Medication review and Diabetes.    Allergies/ADRs: Reviewed in chart  Past Medical History: Reviewed in chart  Tobacco: She reports that she has never smoked. She has never used smokeless tobacco.    Medication Adherence/Access: She has a container that she puts her medications for the next day. She has a pillbox, but has not used it yet.      Diabetes  Type II: Currently taking Jardiance, and glipizide XL 5 mg daily,  .  Patient does not like injecting insulin.   Not taking aspirin due age  Blood sugar monitoring: never  Current diabetes symptoms: none  Diet/Exercise: She said she eats healthy; she does not eat a lot of proteins and she eats a lot fruits and veggies. She said she is really conscious of what she eats. She never had a sweat stuff,  but ate once at  [employer]. If she has sweats at home she would eat them though. She adds honey in her coffee in the morning, but not anymore. Noted she was eating a little bit more sugary food during Christmas and new year.      Eye exam is up to date  Foot exam: due  Urine Albumin:   Lab Results   Component Value Date    UMALCR  05/24/2024      Comment:      Unable to calculate, urine albumin and/or urine creatinine is outside detectable limits.  Microalbuminuria is defined as an albumin:creatinine ratio of 17 to 299 for males and 25 to 299 for females. A ratio of albumin:creatinine of 300 or higher is indicative of overt proteinuria.  Due to biologic variability, positive results should be confirmed by a second, first-morning random or 24-hour timed urine specimen. If there is discrepancy, a third specimen is recommended. When 2 out of 3 results are in the microalbuminuria range, this is evidence for incipient nephropathy and warrants increased efforts at glucose control, blood pressure control, and institution of therapy with an angiotensin-converting-enzyme (ACE) inhibitor (if the patient can tolerate it).        Lab Results   Component Value Date    A1C 8.0 (H) 04/25/2024             Ozempic 28 days $47.00 Mounjaro 28 days $47.00 trulicity 28 days $47.00 victoza not covered Bydureon 28 days $47.00 rybelsus 30 days $47.00    Hypertension :    Lisinopril 40 mg daily    Hydrochlorothiazide 25 mg daily    Said her blood pressure has not been high. She said the last time she checked her BP was normal. She tries to walk 5 thousand steps a day.   Patient reports no current medication side effects  Patient self monitors blood pressure.  Home BP monitoring sometimes . She has been checking her BP readings, but forgot to bring the readings today. BP readings are a bit high these days, and we should consider adding a new med or adjust the medication the patient is taking.       BP Readings from Last 3 Encounters:   07/19/24  132/60   06/14/24 128/60   05/24/24 (!) 154/84     Pulse Readings from Last 3 Encounters:   06/14/24 71   05/24/24 79   04/25/24 64     BP: 123/76, 135/72,   Manual reading at the clinic 132/60    Today's Vitals: /60   LMP  (LMP Unknown)   ----------------      I spent 30 minutes with this patient today. All changes were made via collaborative practice agreement with Dianna Lucero MD. A copy of the visit note was provided to the patient's provider(s).    A summary of these recommendations was given to the patient.         Medication Therapy Recommendations  No medication therapy recommendations to display     Velma Medina, PharmD     Medication Therapy Management (MTM) Pharmacist     [phone]     [email or web address]

## 2024-08-04 DIAGNOSIS — E11.22 CONTROLLED TYPE 2 DIABETES MELLITUS WITH STAGE 3 CHRONIC KIDNEY DISEASE, WITHOUT LONG-TERM CURRENT USE OF INSULIN (H): ICD-10-CM

## 2024-08-04 DIAGNOSIS — N18.30 CONTROLLED TYPE 2 DIABETES MELLITUS WITH STAGE 3 CHRONIC KIDNEY DISEASE, WITHOUT LONG-TERM CURRENT USE OF INSULIN (H): ICD-10-CM

## 2024-08-06 ENCOUNTER — OFFICE VISIT (OUTPATIENT)
Dept: PHARMACY | Facility: CLINIC | Age: 84
End: 2024-08-06
Payer: COMMERCIAL

## 2024-08-06 DIAGNOSIS — E11.22 CONTROLLED TYPE 2 DIABETES MELLITUS WITH STAGE 3 CHRONIC KIDNEY DISEASE, WITHOUT LONG-TERM CURRENT USE OF INSULIN (H): Primary | ICD-10-CM

## 2024-08-06 DIAGNOSIS — N18.30 CONTROLLED TYPE 2 DIABETES MELLITUS WITH STAGE 3 CHRONIC KIDNEY DISEASE, WITHOUT LONG-TERM CURRENT USE OF INSULIN (H): Primary | ICD-10-CM

## 2024-08-06 PROCEDURE — 99207 PR NO CHARGE LOS: CPT

## 2024-08-06 NOTE — PROGRESS NOTES
Clinical Pharmacy Consult:                                                    Berta Witt is a 83 year old female seen for a clinical pharmacist consult.     Reason for Consult: Marta 3 sensor defect    Discussion: Patient is here to discuss why marta 3 sensor is not working, and is not connecting with the torres. It looks patient had placed the marta sensor correctly, and after scanning, it started to work.     Plan:  PharmD to help connect marta 3 sensor with the torres     Follow-up: Due on 09/02/2024      Velma Medina, Yana     Medication Therapy Management (MTM) Pharmacist     [phone]     [email or web address]

## 2024-08-06 NOTE — PATIENT INSTRUCTIONS
"Recommendations from today's MTM visit:                                                      MTM (medication therapy management) is a service provided by a clinical pharmacist designed to help you get the most of out of your medicines.   Today we reviewed what your medicines are for, how to know if they are working, that your medicines are safe and how to make your medicine regimen as easy as possible.      PharmD to help connect marta 3 sensor with the torres     Follow-up: Due on 09/02/2024    It was great speaking with you today.  I value your experience and would be very thankful for your time in providing feedback in our clinic survey. In the next few days, you may receive an email or text message from [employer] with a link to a survey related to your  clinical pharmacist.\"     To schedule another MTM appointment, please call the clinic directly or you may call the MTM scheduling line at [phone].    My Clinical Pharmacist's contact information:                                                      Please feel free to contact me with any questions or concerns you have.        Velma Medina, DaquanD     Medication Therapy Management (MTM) Pharmacist     [phone]     [email or web address]     [facility]  " Anesthesia Type: 1% lidocaine with epinephrine and a 1:10 solution of 8.4% sodium bicarbonate

## 2024-09-06 ENCOUNTER — TELEPHONE (OUTPATIENT)
Dept: INTERNAL MEDICINE | Facility: CLINIC | Age: 84
End: 2024-09-06
Payer: COMMERCIAL

## 2024-09-06 DIAGNOSIS — N18.30 CONTROLLED TYPE 2 DIABETES MELLITUS WITH STAGE 3 CHRONIC KIDNEY DISEASE, WITHOUT LONG-TERM CURRENT USE OF INSULIN (H): ICD-10-CM

## 2024-09-06 DIAGNOSIS — E11.22 CONTROLLED TYPE 2 DIABETES MELLITUS WITH STAGE 3 CHRONIC KIDNEY DISEASE, WITHOUT LONG-TERM CURRENT USE OF INSULIN (H): ICD-10-CM

## 2024-09-06 RX ORDER — BLOOD-GLUCOSE SENSOR
1 EACH MISCELLANEOUS
Qty: 2 EACH | Refills: 11 | Status: SHIPPED | OUTPATIENT
Start: 2024-09-06 | End: 2024-09-30

## 2024-09-06 NOTE — TELEPHONE ENCOUNTER
Esvin 3 sensor was ordered and sent to patient's preferred pharmacy.          Velma Medina, PharmD     Medication Therapy Management (MTM) Pharmacist     [phone]     [email or web address]

## 2024-09-10 ENCOUNTER — TRANSFERRED RECORDS (OUTPATIENT)
Dept: HEALTH INFORMATION MANAGEMENT | Facility: CLINIC | Age: 84
End: 2024-09-10
Payer: COMMERCIAL

## 2024-09-16 ENCOUNTER — OFFICE VISIT (OUTPATIENT)
Dept: INTERNAL MEDICINE | Facility: CLINIC | Age: 84
End: 2024-09-16
Payer: COMMERCIAL

## 2024-09-16 VITALS
SYSTOLIC BLOOD PRESSURE: 122 MMHG | WEIGHT: 171.4 LBS | DIASTOLIC BLOOD PRESSURE: 74 MMHG | RESPIRATION RATE: 10 BRPM | HEART RATE: 60 BPM | TEMPERATURE: 97.9 F | OXYGEN SATURATION: 99 % | BODY MASS INDEX: 27.55 KG/M2 | HEIGHT: 66 IN

## 2024-09-16 DIAGNOSIS — I10 PRIMARY HYPERTENSION: ICD-10-CM

## 2024-09-16 DIAGNOSIS — H61.21 IMPACTED CERUMEN OF RIGHT EAR: ICD-10-CM

## 2024-09-16 DIAGNOSIS — N18.30 CONTROLLED TYPE 2 DIABETES MELLITUS WITH STAGE 3 CHRONIC KIDNEY DISEASE, WITHOUT LONG-TERM CURRENT USE OF INSULIN (H): Primary | ICD-10-CM

## 2024-09-16 DIAGNOSIS — N18.32 STAGE 3B CHRONIC KIDNEY DISEASE (H): ICD-10-CM

## 2024-09-16 DIAGNOSIS — E11.22 CONTROLLED TYPE 2 DIABETES MELLITUS WITH STAGE 3 CHRONIC KIDNEY DISEASE, WITHOUT LONG-TERM CURRENT USE OF INSULIN (H): Primary | ICD-10-CM

## 2024-09-16 LAB
ANION GAP SERPL CALCULATED.3IONS-SCNC: 11 MMOL/L (ref 7–15)
BUN SERPL-MCNC: 33.7 MG/DL (ref 8–23)
CALCIUM SERPL-MCNC: 9.8 MG/DL (ref 8.8–10.4)
CHLORIDE SERPL-SCNC: 104 MMOL/L (ref 98–107)
CREAT SERPL-MCNC: 1.67 MG/DL (ref 0.51–0.95)
EGFRCR SERPLBLD CKD-EPI 2021: 30 ML/MIN/1.73M2
GLUCOSE SERPL-MCNC: 143 MG/DL (ref 70–99)
HBA1C MFR BLD: 7.4 % (ref 0–5.6)
HCO3 SERPL-SCNC: 28 MMOL/L (ref 22–29)
POTASSIUM SERPL-SCNC: 3.6 MMOL/L (ref 3.4–5.3)
SODIUM SERPL-SCNC: 143 MMOL/L (ref 135–145)

## 2024-09-16 PROCEDURE — 36415 COLL VENOUS BLD VENIPUNCTURE: CPT | Performed by: INTERNAL MEDICINE

## 2024-09-16 PROCEDURE — 80048 BASIC METABOLIC PNL TOTAL CA: CPT | Performed by: INTERNAL MEDICINE

## 2024-09-16 PROCEDURE — 69209 REMOVE IMPACTED EAR WAX UNI: CPT | Mod: RT | Performed by: INTERNAL MEDICINE

## 2024-09-16 PROCEDURE — 83036 HEMOGLOBIN GLYCOSYLATED A1C: CPT | Performed by: INTERNAL MEDICINE

## 2024-09-16 PROCEDURE — 99214 OFFICE O/P EST MOD 30 MIN: CPT | Mod: 25 | Performed by: INTERNAL MEDICINE

## 2024-09-16 ASSESSMENT — PAIN SCALES - GENERAL: PAINLEVEL: NO PAIN (0)

## 2024-09-16 ASSESSMENT — ENCOUNTER SYMPTOMS: BACK PAIN: 1

## 2024-09-16 NOTE — LETTER
September 17, 2024      Berta Witt  [address] [address]        Dear ,    We are writing to inform you of your test results.    Birdie,  Kidney function is slow but stable.  A1C is improved and at goal.  DR IGGY Wu   Hemoglobin A1c   Result Value Ref Range    Hemoglobin A1C 7.4 (H) 0.0 - 5.6 %      Comment:      Normal <5.7%   Prediabetes 5.7-6.4%    Diabetes 6.5% or higher     Note: Adopted from ADA consensus guidelines.   Basic metabolic panel  (Ca, Cl, CO2, Creat, Gluc, K, Na, BUN)   Result Value Ref Range    Sodium 143 135 - 145 mmol/L    Potassium 3.6 3.4 - 5.3 mmol/L    Chloride 104 98 - 107 mmol/L    Carbon Dioxide (CO2) 28 22 - 29 mmol/L    Anion Gap 11 7 - 15 mmol/L    Urea Nitrogen 33.7 (H) 8.0 - 23.0 mg/dL    Creatinine 1.67 (H) 0.51 - 0.95 mg/dL    GFR Estimate 30 (L) >60 mL/min/1.73m2      Comment:      eGFR calculated using 2021 CKD-EPI equation.    Calcium 9.8 8.8 - 10.4 mg/dL      Comment:      Reference intervals for this test were updated on 7/16/2024 to reflect our healthy population more accurately. There may be differences in the flagging of prior results with similar values performed with this method. Those prior results can be interpreted in the context of the updated reference intervals.    Glucose 143 (H) 70 - 99 mg/dL       If you have any questions or concerns, please call the clinic at the number listed above.       Sincerely,      Dianna Lucero MD

## 2024-09-16 NOTE — PROGRESS NOTES
"  Assessment & Plan     Controlled type 2 diabetes mellitus with stage 3 chronic kidney disease, without long-term current use of insulin (H)  She started using marta system, doing well on glipizide and Jardiance, no hypoglycemia, will check A1c today, goal A1c is between 7.5-8 for her age.  - Hemoglobin A1c  - Basic metabolic panel  (Ca, Cl, CO2, Creat, Gluc, K, Na, BUN)    Stage 3b chronic kidney disease (H)  She is followed by nephrology, creatinine is around 1.6 at baseline, she is on a small dose of Jardiance.  - Basic metabolic panel  (Ca, Cl, CO2, Creat, Gluc, K, Na, BUN)    Primary hypertension  Well-controlled on potassium, hydrochlorothiazide and lisinopril, she denies dizziness orthostasis    Impacted cerumen of right ear  Earwax was removed by our GISELLE Duran.  - REMOVE IMPACTED CERUMEN    Murmur.  She did have echocardiogram done last year which showed good ejection fraction and mild aortic regurgitation of his mild elbow aortic sclerosis.    BMI  Estimated body mass index is 27.66 kg/m  as calculated from the following:    Height as of this encounter: 1.676 m (5' 6\").    Weight as of this encounter: 77.7 kg (171 lb 6.4 oz).       Laura Hampton is a 84 year old, presenting for the following health issues:  Back Pain (Physical therapy has been helping. Using exercises from when she was seeing PT in the past), Flu Shot (Had Covid shot on 9/1, can she get flu shot today? Pharmacist said she should wait until end of month due to age), and Derm Problem (Under left arm- skin issue. )        9/16/2024    10:20 AM   Additional Questions   Roomed by TATIANA Patterson     History of Present Illness       Reason for visit:  Follow up on back pain    She eats 2-3 servings of fruits and vegetables daily.She consumes 0 sweetened beverage(s) daily.She exercises with enough effort to increase her heart rate 20 to 29 minutes per day.  She exercises with enough effort to increase her heart rate 5 days per week.   She is " "taking medications regularly.     Berta is an 84-year-old patient with history of chronic renal insufficiency, type 2 diabetes, hypertension, hyperlipidemia, spinal stenosis, regular blood donations and intermittent anemia, mild aortic stenosis, he is a retired teacher , she is currently here for a follow-up.    Geraldo has had previous back surgery, initially she scheduled this appointment due to increase in low back pain on the right paraspinal area but since then has been doing home exercises that she had leftover from previous PT and pain has resolved.    She is using [employer] monitor and has been seeing our pharmacist regularly.  She is on Jardiance and glipizide, no problems with hypoglycemia, she usually checks her sugars after eating and in the morning they are 117-145 and in the evening 1 .    She exercises by walking at least 3 miles a day and at minimum 30 minutes a day.  Her weight is stable, she does see ophthalmology for regular eye exam.    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    Review of systems: As above, no chest pains palpitations or shortness of breath with exertion, no lower extremity edema      Objective    /74 (BP Location: Left arm, Patient Position: Sitting, Cuff Size: Adult Regular)   Pulse 60   Temp 97.9  F (36.6  C) (Tympanic)   Resp 10   Ht 1.676 m (5' 6\")   Wt 77.7 kg (171 lb 6.4 oz)   LMP  (LMP Unknown)   SpO2 99%   BMI 27.66 kg/m    Body mass index is 27.66 kg/m .  Physical Exam     Patient identified using two patient identifiers.  Ear exam showing wax occlusion completed by GISELLE Duran  Solution: warm water was placed in the right ear(s) via irrigation tool: elephant ear. Impacted cerumen on right ear successfully removed with irrigation and ear curette without any complications. Patient tolerated procedure well and no bleeding noted post ear irrigation.     General: " well appearing female, alert and oriented x3  EYES: Eyelids, conjunctiva, and sclera were normal. Pupils were normal.   HEAD, EARS, NOSE, MOUTH, AND THROAT: no cervical LAD, no thyromegaly or nodules appreciated. TMs are visualized and normal on the left, right ear is obstructed by earwax, oropharynx is clear.  RESPIRATORY: respirations non labored, CTA bl, no wheezes, rales, no forced expiratory wheezing.  CARDIOVASCULAR: Heart rate and rhythm were normal.  Mild systolic ejection murmur right and left sternal border.. There was no peripheral edema. No carotid bruits.  GASTROINTESTINAL: Positive bowel sounds, abdomen is soft, non tender, non distended.     MUSCULOSKELETAL: Muscle mass was normal for age. No joint synovitis or deformity.  LYMPHATIC: There were no enlarged nodes palpable.  SKIN/HAIR/NAILS: Skin color was normal.  No rashes.  NEUROLOGIC: The patient was alert and oriented.  Speech was normal.  There is no facial asymmetry.   PSYCHIATRIC:  Mood and affect were normal.   Lower extremity monofilament test was normal.        Signed Electronically by: Dianna Lucero MD

## 2024-09-18 ENCOUNTER — TELEPHONE (OUTPATIENT)
Dept: SCHEDULING | Facility: CLINIC | Age: 84
End: 2024-09-18
Payer: COMMERCIAL

## 2024-09-18 NOTE — TELEPHONE ENCOUNTER
Test Results    Contacts       Contact Date/Time Type Contact Phone/Fax    09/18/2024 09:43 AM CDT Phone (Incoming) Berta Witt (Self) [phone] (M)            Who ordered the test:  PCP    Type of test: Lab    Date of test:  9/16    Where was the test performed:  [place]    What are your questions/concerns?:  Pt would like to speak with care team to discuss A1C results.    Could we send this information to you in [place] or would you prefer to receive a phone call?:   Patient would prefer a phone call   Okay to leave a detailed message?: Yes at Cell number on file:    Telephone Information:   Mobile [phone]

## 2024-09-18 NOTE — TELEPHONE ENCOUNTER
Attempt 1/3 [facility]    Results message from PCP:      Birdie,  Kidney function is slow but stable.  A1C is improved and at goal.  DR PARKINSON  Written by Dianna Lucero MD on 9/17/2024  1:21 PM CDT      Latest Reference Range & Units 09/16/24 11:12   Hemoglobin A1C 0.0 - 5.6 % 7.4 (H)   (H): Data is abnormally high

## 2024-09-27 ENCOUNTER — TELEPHONE (OUTPATIENT)
Dept: PHARMACY | Facility: CLINIC | Age: 84
End: 2024-09-27
Payer: COMMERCIAL

## 2024-09-27 NOTE — TELEPHONE ENCOUNTER
MTM referral from: [place] clinic visit (referral by provider)    MTM referral outreach attempt #1 on  September 27, 2024    Outcome: Scheduled with me on 10/25/2024    I hope to see you soon!     Sincerely,         Velma Median, PharmD     Medication Therapy Management (MTM) Pharmacist

## 2024-09-30 ENCOUNTER — TELEPHONE (OUTPATIENT)
Dept: PHARMACY | Facility: CLINIC | Age: 84
End: 2024-09-30
Payer: COMMERCIAL

## 2024-09-30 DIAGNOSIS — N18.30 CONTROLLED TYPE 2 DIABETES MELLITUS WITH STAGE 3 CHRONIC KIDNEY DISEASE, WITHOUT LONG-TERM CURRENT USE OF INSULIN (H): ICD-10-CM

## 2024-09-30 DIAGNOSIS — E11.22 CONTROLLED TYPE 2 DIABETES MELLITUS WITH STAGE 3 CHRONIC KIDNEY DISEASE, WITHOUT LONG-TERM CURRENT USE OF INSULIN (H): ICD-10-CM

## 2024-09-30 RX ORDER — HYDROCHLOROTHIAZIDE 12.5 MG/1
CAPSULE ORAL
Qty: 6 EACH | Refills: 11 | Status: SHIPPED | OUTPATIENT
Start: 2024-09-30

## 2024-09-30 RX ORDER — ACYCLOVIR 800 MG/1
1 TABLET ORAL
Qty: 2 EACH | Refills: 11 | Status: SHIPPED | OUTPATIENT
Start: 2024-09-30 | End: 2024-09-30

## 2024-10-01 ENCOUNTER — TELEPHONE (OUTPATIENT)
Dept: NEPHROLOGY | Facility: CLINIC | Age: 84
End: 2024-10-01
Payer: COMMERCIAL

## 2024-10-01 NOTE — TELEPHONE ENCOUNTER
Called patient with a appointment reminder for Wed. 10/9/24 @ 2:00 pm with Dr. Nguyen and a lab draw @ 1:00 pm.    Abhay Cunningham on 10/1/2024 at 12:55 PM

## 2024-10-02 DIAGNOSIS — N18.32 STAGE 3B CHRONIC KIDNEY DISEASE (H): Primary | ICD-10-CM

## 2024-10-09 ENCOUNTER — LAB (OUTPATIENT)
Dept: LAB | Facility: CLINIC | Age: 84
End: 2024-10-09
Payer: COMMERCIAL

## 2024-10-09 ENCOUNTER — OFFICE VISIT (OUTPATIENT)
Dept: NEPHROLOGY | Facility: CLINIC | Age: 84
End: 2024-10-09
Payer: COMMERCIAL

## 2024-10-09 VITALS
HEART RATE: 63 BPM | DIASTOLIC BLOOD PRESSURE: 83 MMHG | TEMPERATURE: 98.5 F | WEIGHT: 172.6 LBS | BODY MASS INDEX: 27.86 KG/M2 | SYSTOLIC BLOOD PRESSURE: 144 MMHG | OXYGEN SATURATION: 99 %

## 2024-10-09 DIAGNOSIS — E87.6 HYPOKALEMIA: ICD-10-CM

## 2024-10-09 DIAGNOSIS — N18.32 STAGE 3B CHRONIC KIDNEY DISEASE (H): ICD-10-CM

## 2024-10-09 LAB
ALBUMIN SERPL BCG-MCNC: 4.4 G/DL (ref 3.5–5.2)
ANION GAP SERPL CALCULATED.3IONS-SCNC: 12 MMOL/L (ref 7–15)
BUN SERPL-MCNC: 38.6 MG/DL (ref 8–23)
CALCIUM SERPL-MCNC: 9.9 MG/DL (ref 8.8–10.4)
CHLORIDE SERPL-SCNC: 103 MMOL/L (ref 98–107)
CREAT SERPL-MCNC: 1.62 MG/DL (ref 0.51–0.95)
EGFRCR SERPLBLD CKD-EPI 2021: 31 ML/MIN/1.73M2
ERYTHROCYTE [DISTWIDTH] IN BLOOD BY AUTOMATED COUNT: 14.1 % (ref 10–15)
GLUCOSE SERPL-MCNC: 132 MG/DL (ref 70–99)
HCO3 SERPL-SCNC: 26 MMOL/L (ref 22–29)
HCT VFR BLD AUTO: 40.2 % (ref 35–47)
HGB BLD-MCNC: 13.4 G/DL (ref 11.7–15.7)
MCH RBC QN AUTO: 30 PG (ref 26.5–33)
MCHC RBC AUTO-ENTMCNC: 33.3 G/DL (ref 31.5–36.5)
MCV RBC AUTO: 90 FL (ref 78–100)
PHOSPHATE SERPL-MCNC: 3.9 MG/DL (ref 2.5–4.5)
PLATELET # BLD AUTO: 173 10E3/UL (ref 150–450)
POTASSIUM SERPL-SCNC: 3.3 MMOL/L (ref 3.4–5.3)
RBC # BLD AUTO: 4.46 10E6/UL (ref 3.8–5.2)
SODIUM SERPL-SCNC: 141 MMOL/L (ref 135–145)
WBC # BLD AUTO: 4.5 10E3/UL (ref 4–11)

## 2024-10-09 PROCEDURE — 36415 COLL VENOUS BLD VENIPUNCTURE: CPT | Performed by: PATHOLOGY

## 2024-10-09 PROCEDURE — 80069 RENAL FUNCTION PANEL: CPT | Performed by: PATHOLOGY

## 2024-10-09 PROCEDURE — 85027 COMPLETE CBC AUTOMATED: CPT | Performed by: PATHOLOGY

## 2024-10-09 PROCEDURE — 99214 OFFICE O/P EST MOD 30 MIN: CPT | Mod: GC

## 2024-10-09 PROCEDURE — G0463 HOSPITAL OUTPT CLINIC VISIT: HCPCS

## 2024-10-09 PROCEDURE — G2211 COMPLEX E/M VISIT ADD ON: HCPCS

## 2024-10-09 RX ORDER — POTASSIUM CHLORIDE 1500 MG/1
20 TABLET, EXTENDED RELEASE ORAL 2 TIMES DAILY
Qty: 90 TABLET | Refills: 2 | Status: SHIPPED | OUTPATIENT
Start: 2024-10-09

## 2024-10-09 ASSESSMENT — PAIN SCALES - GENERAL: PAINLEVEL: NO PAIN (0)

## 2024-10-09 NOTE — NURSING NOTE
Chief Complaint   Patient presents with    RECHECK     4 mo f/u     BP (!) 144/83   Pulse 63   Temp 98.5  F (36.9  C) (Oral)   Wt 78.3 kg (172 lb 9.6 oz)   LMP  (LMP Unknown)   SpO2 99%   BMI 27.86 kg/m      Abhay Cunningham on 10/9/2024 at 1:43 PM

## 2024-10-09 NOTE — PATIENT INSTRUCTIONS
"It was a pleasure to see you in nephrology clinic today. I've included a brief summary of our discussion and care plan from today's visit below.  _______________________________________________________________________    My recommendations are summarized as follows:  -Keep a Blood Pressure log. Please make sure that you are using a validated blood pressure device (check \"www.validatebp.org\").  -Avoid all NSAID's. Examples include Ibuprofen (Advil, Motrin), naprosyn (Aleve), celebrex among others. Acetaminophen (Tylenol) is ok with maximum dose in 24 hours of 3000mg.  -Healthy lifestyle measures will keep your kidney's functioning at their current best. This includes regular exercise, maintaining a healthy body weight and smoking cessation.   - Increase jardiance to 25 mg daily   - Increase potassium to 20 meq twice per day   - Follow up in six months    Please return to Nephrology Clinic in 4/9/2025 to review your progress.     Who do I call with any questions after my visit?  There are multiple ways to contact your nephrology care team:    -To schedule or reschedule an appointment, please call [phone].  -Reach out via [employer]. These messages are answered by your nurse or doctor during business hours and typically in 1-2 days. [employer] messages are best for quick questions/clarifications/updates. Frequently, your doctor or nurse will recommend setting up a follow up appointment to address any significant questions/concerns.  -For urgent questions after business hours, you may reach the on-call Nephrology Fellow by contacting the [facility]  at [phone].    To schedule imaging:   -Please call [phone]     To schedule your lab appointment at the [facility]:  -Please call [phone]    Sincerely,    Dr.Momina Nguyen   Nephrology fellow   Division of Nephrology and Hypertension  [facility]   "

## 2024-10-09 NOTE — PROGRESS NOTES
Assessment and Plan:    # CKD G4A1, Secondary to DKD.  Creatinine has been elevated since 2018, gradually increased to  1.6-1.8. No proteinuria. There was some rise in creatinine in between to1.9 from 1.6 when jardiance started ,then goes back to 1.6  with withdrawal.  Given that the rise was in the expected range  Jardiance re-started for long term kayla and cardioprotective effects. More over it helps for sugar control and some BP reduction. It appears that jardiace well tolerated. Creatinine stable around baseline. No significant proteinuria. No complipcation of CKD including anemia or CKD-MBD thus far.    - Increase Jardiance to 25 mg po daily  - Continue lisnopril   - Encouraged to optimize BP and sugar control   - BMP in three months  - Follow up in six months      # Hypertension, Essential  She is on lisnopril 40 mg and hydrochlorothiazide 25 mg po daily. Has been monitoring BP at home. Home SBP ranges in 110-130 and DBP in 70'.  However BP today in the office is 144/83.   - Continue monitoring BP at home   - Increased jardiance dose will help improve BP as well    - Continue PTA lisinopril 40 mg ,hydrochlorothiazide 25 mg   - Low salt   - Encouraged on DASH diet   # Hypokalemia   Potassium 3.3 . She has been on potassium 20 meq daily . Likely from hydrochlorothiazide.  - Increase KCL to 20 meq bid    # HLD   - On statin     # DM2  Diabetic for decades. Taking glipizide and jardiance. Recent A1C 7.4.   - Treament per primary       Assessment and plan was discussed with patient and she voiced her understanding and agreement.    Consult:  Berta Witt was seen in consultation at the request of  for CKD management.    Reason for Visit:  Berta Witt is a 83 year old female  CKD from DM, who presents for to establish CKD follow up with Nephrology..    HPI:   Berta Witt is a 83 year old female with PMH significant for DM2,HPT, HLD and CKD STAGE 3.  She has diabetes and hypertension for quite  sometime( decades) not sure exactly how long. She has been receiving glipizide, most recent HBAIC 8.0. She has been receiving lisinopril 40 mg and hydrochlorothiazide 25. She has not monitoring BP at home.Remember told SBP in 120' on recent clinic visit. She does exercise at least walk every day and try the best to eat healthy. No visual problem ,not told to have diabetic eye problem.   Regarding her kidney, creatinine noted elevated since 2018, at 1.34 on 1/2028, progressively raised over the years to 1.6. Creatinine bumped to 1.9 on 11/2023 after the initiation of jardiance ,which later stopped for same reason and creatinine back to baseline 1.6. No proteinuria.no lower leg swelling ,shortness of breathing. She does not have history of recurrent UTIs. No history hematuria, kidney stones. No family history of renal diseases.No history NSAID use.      10/9/2024  Has been doing overall well. Had been active, does walk around 3 miles daily. Try to eat as much as possible . No urinary compliant. No LE swelling or shortness of breathing.   Lab : Cr 1.6, K  3.3. CBC  normal . Ca and phosp are normal . UA  and UPCR pending.       ROS:   A comprehensive review of systems was obtained and negative, except as noted in the HPI or PMH.    Active Medical Problems:  Patient Active Problem List   Diagnosis    Vitamin D Deficiency    Hyperlipidemia    Hemorrhoids    Spinal stenosis, lumbar region, with neurogenic claudication    Hypokalemia    Essential hypertension with goal blood pressure less than 140/90    Controlled type 2 diabetes mellitus with stage 3 chronic kidney disease, without long-term current use of insulin (H)    Body mass index (bmi) 29.0-29.9, adult    Stage 3b chronic kidney disease (H)     PMH:   Medical record was reviewed and PMH was discussed with patient and noted below.  Past Medical History:   Diagnosis Date    Abscess of right axilla     Colon polyp 9/2004    Benign    Diabetes mellitus (H)     Type 2     Heart murmur April 2004    I/VI systolic ejection murmur    Hemorrhoids     Hyperlipidemia     Hypertension     Hypokalemia     Left ankle sprain 1/12/11    Lumbar back pain     Spinal stenosis     Syncope 2011    single spontaneous episode     PSH:   Past Surgical History:   Procedure Laterality Date    ARTHROSCOPY SHOULDER ROTATOR CUFF REPAIR Bilateral     BACK SURGERY      LUMBAR LAMINECTOMY Bilateral 8/12/2014    Procedure: BILATERAL L3-5 DECOMPRESSION LAMINECTOMY;  Surgeon: Eric Vásquez MD;  [place] [facility]  Service:     OTHER SURGICAL HISTORY Right     fatty tumor excisionright axilla    POLYPECTOMY      ZZC EXCIS CERV DISK,ONE LEVEL      Description: Laminectomy With Disc Removal;  Recorded: 08/26/2014;    ZZC BENEDICT W/O FACETEC FORAMOT/DSKC 1/2 VRT SEG, CERVICAL      Description: Laminectomy Lumbar;  Proc Date: 08/01/2014;  Comments: bilateral L3-L5       Family Hx:   Family History   Problem Relation Age of Onset    Colon Cancer Mother     Coronary Artery Disease Mother     Diabetes Mother     Hypertension Mother     Breast Cancer Sister 60    Brain Cancer Sister     Kidney failure Sister     Diabetes Sister     Diabetes Sister     Breast Cancer Maternal Grandmother 55    Pancreatic Cancer Other      Personal Hx:   Social History     Tobacco Use    Smoking status: Never    Smokeless tobacco: Never   Substance Use Topics    Alcohol use: No       Allergies:  Allergies   Allergen Reactions    Penicillins Hives       Medications:  Current Outpatient Medications   Medication Sig Dispense Refill    Continuous Glucose Sensor (FREESTYLE MODESTO 2 SENSOR) MISC Change sensor every 14 days. 2 each 11    Continuous Glucose Sensor (FREESTYLE MODESTO 3 PLUS SENSOR) MISC Change every 15 days. 6 each 11    empagliflozin (JARDIANCE) 10 MG TABS tablet Take 1 tablet (10 mg) by mouth daily 90 tablet 3    glipiZIDE (GLUCOTROL XL) 5 MG 24 hr tablet Take 1 tablet (5 mg) by mouth daily 90 tablet 3     hydrochlorothiazide (HYDRODIURIL) 25 MG tablet Take 1 tablet (25 mg) by mouth daily      lisinopril (ZESTRIL) 40 MG tablet Take 1 tablet (40 mg) by mouth daily 90 tablet 4    Multiple Vitamins-Minerals (MULTIVITAMIN ADULTS PO) Take by mouth daily      Nutritional Supplements (HIGH-PROTEIN NUTRITIONAL SHAKE) LIQD Take 11 oz by mouth daily This is fairlife nutrition plan. It has 30 gm of proteins.      potassium chloride ER (KLOR-CON M) 20 MEQ CR tablet Take 1 tablet (20 mEq) by mouth daily      simvastatin (ZOCOR) 10 MG tablet TAKE 1 TABLET AT BEDTIME 90 tablet 1    UNABLE TO FIND Take 1 packet by mouth daily MEDICATION NAME: Java Burn. This is for weight loss. It has a number of vitamins, mineral and extracts. One serving is a packet of 2.5 gm       No current facility-administered medications for this visit.      Vitals:  LMP  (LMP Unknown)     Exam:  GENERAL APPEARANCE: alert and no distress  HENT: mouth without ulcers or lesions  RESP: lungs clear to auscultation - no rales, rhonchi or wheezes  CV: regular rhythm, normal rate, no rub, no murmur  EDEMA: no LE edema bilaterally  ABDOMEN: soft, nondistended, nontender, bowel sounds normal  MS: extremities normal - no gross deformities noted, no evidence of inflammation in joints, no muscle tenderness  SKIN: no rash    LABS:   CMP  Recent Labs   Lab Test 09/16/24  1112 07/19/24  0911 06/13/24  0808 05/24/24  0719 01/10/22  1319 02/05/21  1031 07/15/20  1647 09/11/19  1145 03/22/19  1556    139 142 143   < > 142 140 143 142   POTASSIUM 3.6 4.0 3.8 3.6   < > 3.7 3.5 3.9 3.9   CHLORIDE 104 103 104 104   < > 106 101 103 101   CO2 28 25 24 26   < > 28 28 30 29   ANIONGAP 11 11 14 13   < > 8 11 10 12   * 156* 113* 175*   < > 105 162* 90 88   BUN 33.7* 56.0* 51.5* 36.1*   < > 17 27 26 28   CR 1.67* 1.70* 1.88* 1.59*   < > 1.52* 1.74* 1.45* 1.39*   GFRESTIMATED 30* 29* 26* 32*   < > 33* 28* 35* 37*   GFRESTBLACK  --   --   --   --   --  40* 34* 42* 44*   ROS 9.8  9.4 10.1 9.7   < > 9.4 9.9 10.1 10.8*    < > = values in this interval not displayed.     Recent Labs   Lab Test 06/09/23  1140 08/03/22  0933 01/10/22  1319 02/05/21  1031   BILITOTAL 0.4 0.5 0.6 0.6   ALKPHOS 53 65 67 54   ALT 12 14 15 11   AST 22 24 20 20     CBC  Recent Labs   Lab Test 05/24/24  0719 04/25/24  1539 09/15/23  1615 02/08/23  1225   HGB 12.6 12.2 13.6 12.6   WBC 4.6 5.4 4.9 5.0   RBC 4.22 4.07 4.53 4.10   HCT 38.6 37.3 42.2 37.8   MCV 92 92 93 92   MCH 29.9 30.0 30.0 30.7   MCHC 32.6 32.7 32.2 33.3   RDW 13.2 13.2 13.9 12.9    174 199 225     URINE STUDIES  Recent Labs   Lab Test 05/24/24  0913 01/11/22  1014   COLOR Straw Yellow   APPEARANCE Clear Clear   URINEGLC Negative Negative   URINEBILI Negative Negative   URINEKETONE Negative Negative   SG 1.010 1.015   UBLD Negative Negative   URINEPH 5.0 7.0   PROTEIN Negative Negative   UROBILINOGEN  --  0.2   NITRITE Negative Negative   LEUKEST Negative Large*   RBCU 1 0-2   WBCU 2 5-10*     No lab results found.  PTH  Recent Labs   Lab Test 04/25/24  1539   PTHI 38     IRON STUDIES  Recent Labs   Lab Test 02/08/23  1225 08/03/22  0933 07/28/20  1324   IRON 57 52 45    312  --    IRONSAT 18 17  --    VIRGEN  --   --  8*       Sharita Nguyen MD

## 2024-10-25 ENCOUNTER — OFFICE VISIT (OUTPATIENT)
Dept: PHARMACY | Facility: CLINIC | Age: 84
End: 2024-10-25
Payer: COMMERCIAL

## 2024-10-25 VITALS — SYSTOLIC BLOOD PRESSURE: 136 MMHG | DIASTOLIC BLOOD PRESSURE: 68 MMHG

## 2024-10-25 DIAGNOSIS — E11.22 CONTROLLED TYPE 2 DIABETES MELLITUS WITH STAGE 3 CHRONIC KIDNEY DISEASE, WITHOUT LONG-TERM CURRENT USE OF INSULIN (H): Primary | ICD-10-CM

## 2024-10-25 DIAGNOSIS — N18.30 CONTROLLED TYPE 2 DIABETES MELLITUS WITH STAGE 3 CHRONIC KIDNEY DISEASE, WITHOUT LONG-TERM CURRENT USE OF INSULIN (H): Primary | ICD-10-CM

## 2024-10-25 DIAGNOSIS — I10 ESSENTIAL HYPERTENSION WITH GOAL BLOOD PRESSURE LESS THAN 140/90: ICD-10-CM

## 2024-10-25 DIAGNOSIS — N18.32 STAGE 3B CHRONIC KIDNEY DISEASE (H): ICD-10-CM

## 2024-10-25 PROCEDURE — 99606 MTMS BY PHARM EST 15 MIN: CPT

## 2024-10-25 PROCEDURE — 99607 MTMS BY PHARM ADDL 15 MIN: CPT

## 2024-10-25 NOTE — PROGRESS NOTES
Medication Therapy Management (MTM) Encounter    ASSESSMENT:                            Medication Adherence/Access: No issues identified.She has a container that she puts her medications for the next day. She got a pillbox, but has not used it yet.     Diabetes Type II: A1C within goal of < 8. Most of the CGM readings are within the target range (70 - 180) 91%.   Considering patient's low renal function appropriate to make some lifestyle changes such as drinking enough water and eating fruits that have antioxidant activities ( the cherries and berries).  Patient is following renal and they increased the dose to 25 mg; however, the 10 mg is clinically appropriate for renal protection;unsure of the motivation for increasing the dose. Will communicate with renal.     Hypertension: In clinic BP mostly within goal of < 130/80. In clinic BP sometimes above goal likely due to white coat syndrome. Advised patient to check her BP and report to writer. Considering controlled BP reasonable to continue current medications.     PLAN:                            PharmD to communicate with Renal about jardiance dose increase  Please consider checking your blood pressure at least three times a week    Follow-up: Due on 11/25/2024@ 8:30 AM    SUBJECTIVE/OBJECTIVE:                          Berta Witt is a 84 year old female seen for a follow up visit.      Reason for visit: Diabetes Type II and Hypertension     Allergies/ADRs: Reviewed in chart  Past Medical History: Reviewed in chart  Tobacco: She reports that she has never smoked. She has never used smokeless tobacco.    Medication Adherence/Access: She has a container that she puts her medications for the next day. She has a pillbox, but has not used it yet.      Diabetes  Type II:Currently taking Jardiance, and glipizide XL 5 mg daily.  Patient does not like injecting insulin.   Not taking aspirin due age  Blood sugar monitoring: never  Current diabetes symptoms:  none  Diet/Exercise: She said she eats healthy; she does not eat a lot of proteins and she eats a lot fruits and veggies. She said she is really conscious of what she eats. If she has sweats at home she would eat them though. She adds honey in her coffee in the morning, but not anymore. Noted she was eating a little bit more sugary food during Christmas and new year.      Eye exam is up to date  Foot exam: due  Urine Albumin:   Lab Results   Component Value Date    UMALCR  05/24/2024      Comment:      Unable to calculate, urine albumin and/or urine creatinine is outside detectable limits.  Microalbuminuria is defined as an albumin:creatinine ratio of 17 to 299 for males and 25 to 299 for females. A ratio of albumin:creatinine of 300 or higher is indicative of overt proteinuria.  Due to biologic variability, positive results should be confirmed by a second, first-morning random or 24-hour timed urine specimen. If there is discrepancy, a third specimen is recommended. When 2 out of 3 results are in the microalbuminuria range, this is evidence for incipient nephropathy and warrants increased efforts at glucose control, blood pressure control, and institution of therapy with an angiotensin-converting-enzyme (ACE) inhibitor (if the patient can tolerate it).        Lab Results   Component Value Date    A1C 7.4 (H) 09/16/2024             Ozempic 28 days $47.00 Mounjaro 28 days $47.00 trulicity 28 days $47.00 victoza not covered Bydureon 28 days $47.00 rybelsus 30 days $47.00  - She had follow up with renal recently, and they increased jardiance and potassium doses.       Hypertension :    Lisinopril 40 mg daily    Hydrochlorothiazide 25 mg daily    Said her blood pressure has not been high. She said the last time she checked her BP was normal. She tries to walk 5 thousand steps a day.   Patient reports no current medication side effects  Patient self monitors blood pressure.  Home BP monitoring sometimes . Home BP within  goal, but some in clinic above goal likely due to nervousness.        BP Readings from Last 3 Encounters:   10/25/24 136/68   10/09/24 (!) 144/83   09/16/24 122/74     BP Readings from Last 3 Encounters:   10/25/24 136/68   10/09/24 (!) 144/83   09/16/24 122/74     BP: 124/74,117/69, 112/72, and 114/76    Today's Vitals: /68   LMP  (LMP Unknown)   ----------------      I spent 30 minutes with this patient today. All changes were made via collaborative practice agreement with Dianna Lucero MD. A copy of the visit note was provided to the patient's provider(s).    A summary of these recommendations was given to the patient.       Medication Therapy Recommendations  No medication therapy recommendations to display     Vlema Medina, PharmD     Medication Therapy Management (MTM) Pharmacist     [phone]     [email or web address]

## 2024-10-25 NOTE — Clinical Note
Carter Nguyen and Jeremie,  I was wondering about jardiance dose. Considering her sugar is controlled and also BP relatively within goal it would be reasonable to continue the 10 mg daily. The 25 mg is a bit harder on the kidney as shown in my assessment (Picture). The order is put in as 30 mg ( three tablets of the 10 mg). I would highly appreciate it if you would look into it.  Thanks, Velma

## 2024-10-25 NOTE — PATIENT INSTRUCTIONS
"Recommendations from today's MTM visit:                                                      MTM (medication therapy management) is a service provided by a clinical pharmacist designed to help you get the most of out of your medicines.   Today we reviewed what your medicines are for, how to know if they are working, that your medicines are safe and how to make your medicine regimen as easy as possible.      PharmD to communicate with Renal about jardiance dose increase  Please consider checking your blood pressure at least three times a week    Follow-up: Due on 11/25/2024@ 8:30 AM    It was great speaking with you today.  I value your experience and would be very thankful for your time in providing feedback in our clinic survey. In the next few days, you may receive an email or text message from [facility] [employer] with a link to a survey related to your  clinical pharmacist.\"     To schedule another MTM appointment, please call the clinic directly or you may call the MTM scheduling line at [phone].    My Clinical Pharmacist's contact information:                                                      Please feel free to contact me with any questions or concerns you have.        Velma Medina, PharmD     Medication Therapy Management (MTM) Pharmacist     [phone]     [email or web address]  "

## 2024-11-25 ENCOUNTER — VIRTUAL VISIT (OUTPATIENT)
Dept: PHARMACY | Facility: CLINIC | Age: 84
End: 2024-11-25
Payer: COMMERCIAL

## 2024-11-25 ENCOUNTER — LAB (OUTPATIENT)
Dept: LAB | Facility: CLINIC | Age: 84
End: 2024-11-25
Attending: INTERNAL MEDICINE
Payer: COMMERCIAL

## 2024-11-25 DIAGNOSIS — N18.30 CONTROLLED TYPE 2 DIABETES MELLITUS WITH STAGE 3 CHRONIC KIDNEY DISEASE, WITHOUT LONG-TERM CURRENT USE OF INSULIN (H): Primary | ICD-10-CM

## 2024-11-25 DIAGNOSIS — N18.30 CONTROLLED TYPE 2 DIABETES MELLITUS WITH STAGE 3 CHRONIC KIDNEY DISEASE, WITHOUT LONG-TERM CURRENT USE OF INSULIN (H): ICD-10-CM

## 2024-11-25 DIAGNOSIS — I10 ESSENTIAL HYPERTENSION WITH GOAL BLOOD PRESSURE LESS THAN 140/90: ICD-10-CM

## 2024-11-25 DIAGNOSIS — E11.22 CONTROLLED TYPE 2 DIABETES MELLITUS WITH STAGE 3 CHRONIC KIDNEY DISEASE, WITHOUT LONG-TERM CURRENT USE OF INSULIN (H): Primary | ICD-10-CM

## 2024-11-25 DIAGNOSIS — E11.22 CONTROLLED TYPE 2 DIABETES MELLITUS WITH STAGE 3 CHRONIC KIDNEY DISEASE, WITHOUT LONG-TERM CURRENT USE OF INSULIN (H): ICD-10-CM

## 2024-11-25 LAB
ALBUMIN SERPL BCG-MCNC: 4.4 G/DL (ref 3.5–5.2)
ANION GAP SERPL CALCULATED.3IONS-SCNC: 11 MMOL/L (ref 7–15)
BUN SERPL-MCNC: 46.2 MG/DL (ref 8–23)
CALCIUM SERPL-MCNC: 10.2 MG/DL (ref 8.8–10.4)
CHLORIDE SERPL-SCNC: 104 MMOL/L (ref 98–107)
CREAT SERPL-MCNC: 1.64 MG/DL (ref 0.51–0.95)
EGFRCR SERPLBLD CKD-EPI 2021: 31 ML/MIN/1.73M2
GLUCOSE SERPL-MCNC: 136 MG/DL (ref 70–99)
HCO3 SERPL-SCNC: 25 MMOL/L (ref 22–29)
PHOSPHATE SERPL-MCNC: 4.1 MG/DL (ref 2.5–4.5)
POTASSIUM SERPL-SCNC: 4.4 MMOL/L (ref 3.4–5.3)
SODIUM SERPL-SCNC: 140 MMOL/L (ref 135–145)

## 2024-11-25 PROCEDURE — 80069 RENAL FUNCTION PANEL: CPT

## 2024-11-25 PROCEDURE — 36415 COLL VENOUS BLD VENIPUNCTURE: CPT

## 2024-11-25 PROCEDURE — 99606 MTMS BY PHARM EST 15 MIN: CPT | Mod: 93

## 2024-11-25 PROCEDURE — 99607 MTMS BY PHARM ADDL 15 MIN: CPT | Mod: 93

## 2024-11-25 RX ORDER — DESONIDE 0.5 MG/G
CREAM TOPICAL
COMMUNITY
Start: 2024-10-14

## 2024-11-25 NOTE — PATIENT INSTRUCTIONS
"Recommendations from today's MTM visit:                                                      MTM (medication therapy management) is a service provided by a clinical pharmacist designed to help you get the most of out of your medicines.   Today we reviewed what your medicines are for, how to know if they are working, that your medicines are safe and how to make your medicine regimen as easy as possible.      PharmD to order lab order for kidney function and patient to visit the lab   Please call the clinic  and make an appointment with clinic lab     Follow-up: Due on when needed     It was great speaking with you today.  I value your experience and would be very thankful for your time in providing feedback in our clinic survey. In the next few days, you may receive an email or text message from [facility] [employer] with a link to a survey related to your  clinical pharmacist.\"     To schedule another MTM appointment, please call the clinic directly or you may call the MTM scheduling line at [phone].    My Clinical Pharmacist's contact information:                                                      Please feel free to contact me with any questions or concerns you have.        Velma Medina, DaquanD     Medication Therapy Management (MTM) Pharmacist     [phone]     [email or web address]     [facility]  "

## 2024-11-25 NOTE — PROGRESS NOTES
Medication Therapy Management (MTM) Encounter    ASSESSMENT:                            Medication Adherence/Access: No issues identified.She has a container that she puts her medications for the next day. She got a pillbox, but has not used it yet.     Diabetes Type II: A1C within goal of < 8. Most of the CGM readings are within the target range (70 - 180) 95% well above goal of > 70%, and sugar has improved. Considering controlled sugar readings reasonable to continue current medications. Patient is due for A1c and renal panel check. Patient will be scheduling with the lab to do blood work. Orders are placed.    Hypertension:In clinic, and home BP mostly within goal of < 130/80.  Will continue taking current medications as prescribed.     PLAN:                            PharmD to order lab order for kidney function and patient to visit the lab   Please call the clinic  and make an appointment with clinic lab     Follow-up: Due on when needed     SUBJECTIVE/OBJECTIVE:                          Berta Witt is a 84 year old female seen for a follow up visit.      Reason for visit: Diabetes Type II and Hypertension     Allergies/ADRs: Reviewed in chart  Past Medical History: Reviewed in chart  Tobacco: She reports that she has never smoked. She has never used smokeless tobacco.    Medication Adherence/Access: She has a container that she puts her medications for the next day. She has a pillbox, but has not used it yet.      Diabetes  Type II:Currently taking Jardiance, and glipizide XL 5 mg daily.  Patient does not like injecting insulin.   Not taking aspirin due age  Blood sugar monitoring: never  Current diabetes symptoms: none  Diet/Exercise: She said she eats healthy; she does not eat a lot of proteins and she eats a lot fruits and veggies. She said she is really conscious of what she eats. If she has sweats at home she would eat them though. She adds honey in her coffee in the morning, but not anymore.  She  avoided eating chips, and cheerios   Eye exam is up to date  Foot exam: due  Urine Albumin:   Lab Results   Component Value Date    UMALCR  05/24/2024      Comment:      Unable to calculate, urine albumin and/or urine creatinine is outside detectable limits.  Microalbuminuria is defined as an albumin:creatinine ratio of 17 to 299 for males and 25 to 299 for females. A ratio of albumin:creatinine of 300 or higher is indicative of overt proteinuria.  Due to biologic variability, positive results should be confirmed by a second, first-morning random or 24-hour timed urine specimen. If there is discrepancy, a third specimen is recommended. When 2 out of 3 results are in the microalbuminuria range, this is evidence for incipient nephropathy and warrants increased efforts at glucose control, blood pressure control, and institution of therapy with an angiotensin-converting-enzyme (ACE) inhibitor (if the patient can tolerate it).        Lab Results   Component Value Date    A1C 7.4 (H) 09/16/2024             Ozempic 28 days $47.00 Mounjaro 28 days $47.00 trulicity 28 days $47.00 victoza not covered Bydureon 28 days $47.00 rybelsus 30 days $47.00  - She had follow up with renal recently, and they increased jardiance and potassium doses.     - Patient complained of shoulder pain that started a month ago; she has been using lidocaine and the icy hot as well, which noted it helps.     Hypertension :    Lisinopril 40 mg daily    Hydrochlorothiazide 25 mg daily    Said her blood pressure has not been high. She said the last time she checked her BP was normal. She tries to walk 5 thousand steps a day.   Patient reports no current medication side effects  Patient self monitors blood pressure.  Home BP monitoring sometimes . Home BP within goal, but some in clinic above goal likely due to nervousness.        BP Readings from Last 3 Encounters:   10/25/24 136/68   10/09/24 (!) 144/83   09/16/24 122/74     BP Readings from Last 3  Encounters:   10/25/24 136/68   10/09/24 (!) 144/83   09/16/24 122/74     BP: 135/67, 117/69, 124/74,112/72, 114/76,     Today's Vitals: LMP  (LMP Unknown)   ----------------      I spent 27 minutes with this patient today. All changes were made via collaborative practice agreement with Dianna Lucero MD. A copy of the visit note was provided to the patient's provider(s).    A summary of these recommendations was sent via [employer].      Telemedicine Visit Details  The patient's medications can be safely assessed via a telemedicine encounter.  Type of service:  Telephone visit  Originating Location (pt. Location): Home    Distant Location (provider location):  On-site  Start Time:  08:32 AM  End Time:  08:59 AM     Medication Therapy Recommendations  No medication therapy recommendations to display     Velma Medina PharmD     Medication Therapy Management (MTM) Pharmacist     [phone]     [email or web address]

## 2024-11-29 ENCOUNTER — ANCILLARY PROCEDURE (OUTPATIENT)
Dept: GENERAL RADIOLOGY | Facility: CLINIC | Age: 84
End: 2024-11-29
Attending: INTERNAL MEDICINE
Payer: COMMERCIAL

## 2024-11-29 DIAGNOSIS — G89.29 CHRONIC LEFT SHOULDER PAIN: ICD-10-CM

## 2024-11-29 DIAGNOSIS — M25.512 CHRONIC LEFT SHOULDER PAIN: ICD-10-CM

## 2024-11-29 PROCEDURE — 73030 X-RAY EXAM OF SHOULDER: CPT | Mod: TC | Performed by: RADIOLOGY

## 2024-12-02 ENCOUNTER — PATIENT OUTREACH (OUTPATIENT)
Dept: CARE COORDINATION | Facility: CLINIC | Age: 84
End: 2024-12-02
Payer: COMMERCIAL

## 2024-12-02 NOTE — PROGRESS NOTES
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 Mayes Daily with [facility])  - Right shoulder RC surgery as well    Location & description of pain:  - left anterior shoulder joint.     Exacerbating factors:   - usage, getting dressed  - Active adduction, flexion >90deg, IR, reaching behind back  - washing hair    Remitting factors:  - salon pas, modification    Previous Treatments:  -Medication: Salon Pas patch,   -Rehabilitation: not recently   -Durable Medical Equipment:X  -Injections: X  -Modalities: X  -Other Providers seen: Gerber Bonilla [facility] (?) for surgeries    Sports, Hobbies, Employment:  - retired  - volunteer [email or web address]  - [employer] walking- not impacted    Average hours of sleep per night: 8 hours, sleep not impacted by pain    Average minutes of exercise per day: Daily 30 mins or more walking.     Area of Problem  12/4/2024  Date 2 Date 3 Date 4 Date 5   Function Ability in last week - % of Baseline (0 is worst & 100 is best)        Sport/Activity Ability in last week - % of Baseline (0 is worst & 100 is best)        Pain Level in the last week (0 is best & 10 is worst)  4/10 to 5/10         Additional Information of consideration:  -Numbness/paresthesias in extremities?None    MEDICATIONS    Current Outpatient Medications:     Continuous Glucose Sensor (FREESTYLE MODESTO 2 SENSOR) MISC, Change sensor every 14 days., Disp: 2 each, Rfl: 11    Continuous Glucose Sensor (FREESTYLE MODESTO 3 PLUS SENSOR) MISC, Change every 15 days., " Disp: 6 each, Rfl: 11    desonide (DESOWEN) 0.05 % external cream, APPLY TO THE AREA IN GROIN 2X DAILY FOR 2 WEEKS, TAKE A 2 WEEK BREAK, THEN REPEAT AS NEEDED FOR FLARES., Disp: , Rfl:     diclofenac (VOLTAREN) 1 % topical gel, Apply 2 g topically 4 times daily., Disp: 150 g, Rfl: 5    empagliflozin (JARDIANCE) 10 MG TABS tablet, Take 1 tablet (10 mg) by mouth daily., Disp: 90 tablet, Rfl: 3    glipiZIDE (GLUCOTROL XL) 5 MG 24 hr tablet, Take 1 tablet (5 mg) by mouth daily, Disp: 90 tablet, Rfl: 3    hydrochlorothiazide (HYDRODIURIL) 25 MG tablet, Take 1 tablet (25 mg) by mouth daily, Disp: , Rfl:     lisinopril (ZESTRIL) 40 MG tablet, Take 1 tablet (40 mg) by mouth daily, Disp: 90 tablet, Rfl: 4    Multiple Vitamins-Minerals (MULTIVITAMIN ADULTS PO), Take by mouth daily, Disp: , Rfl:     Nutritional Supplements (HIGH-PROTEIN NUTRITIONAL SHAKE) LIQD, Take 11 oz by mouth daily This is [employer] nutrition plan. It has 30 gm of proteins., Disp: , Rfl:     potassium chloride howie ER (KLOR-CON M20) 20 MEQ CR tablet, Take 1 tablet (20 mEq) by mouth 2 times daily., Disp: 90 tablet, Rfl: 2    simvastatin (ZOCOR) 10 MG tablet, TAKE 1 TABLET AT BEDTIME, Disp: 90 tablet, Rfl: 1    UNABLE TO FIND, Take 1 packet by mouth daily MEDICATION NAME: Java Burn. This is for weight loss. It has a number of vitamins, mineral and extracts. One serving is a packet of 2.5 gm, Disp: , Rfl:     ALLERGIES  Allergies   Allergen Reactions    Penicillins Hives       PAST MEDICAL HISTORY  Past Medical History:   Diagnosis Date    Abscess of right axilla     Colon polyp 9/2004    Benign    Diabetes mellitus (H)     Type 2    Heart murmur April 2004    I/VI systolic ejection murmur    Hemorrhoids     Hyperlipidemia     Hypertension     Hypokalemia     Left ankle sprain 1/12/11    Lumbar back pain     Spinal stenosis     Syncope 2011    single spontaneous episode       PAST SURGICAL HISTORY  Past Surgical History:   Procedure Laterality Date     ARTHROSCOPY SHOULDER ROTATOR CUFF REPAIR Bilateral     BACK SURGERY      LUMBAR LAMINECTOMY Bilateral 8/12/2014    Procedure: BILATERAL L3-5 DECOMPRESSION LAMINECTOMY;  Surgeon: Eric Vásquez MD;  Location: [facility];  Service:     OTHER SURGICAL HISTORY Right     fatty tumor excisionright axilla    POLYPECTOMY      ZZC EXCIS CERV DISK,ONE LEVEL      Description: Laminectomy With Disc Removal;  Recorded: 08/26/2014;    ZZC BENEDICT W/O FACETEC FORAMOT/DSKC 1/2 VRT SEG, CERVICAL      Description: Laminectomy Lumbar;  Proc Date: 08/01/2014;  Comments: bilateral L3-L5       SOCIAL HISTORY  Social History     Socioeconomic History    Marital status:      Spouse name: Not on file    Number of children: 2    Years of education: Not on file    Highest education level: Not on file   Occupational History    Not on file   Tobacco Use    Smoking status: Never    Smokeless tobacco: Never   Vaping Use    Vaping status: Never Used   Substance and Sexual Activity    Alcohol use: No    Drug use: No    Sexual activity: Not on file   Other Topics Concern    Not on file   Social History Narrative    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[place]        2 children, 2 grandchildren    Originally from [place]         Social Drivers of Health     Financial Resource Strain: Low Risk  (6/14/2024)    Financial Resource Strain     Within the past 12 months, have you or your family members you live with been unable to get utilities (heat, electricity) when it was really needed?: No   Food Insecurity: Low Risk  (6/14/2024)    Food Insecurity     Within the past 12 months, did you worry that your food would run out before you got money to buy more?: No     Within the past 12 months, did the food you bought just not last and you didn t have money to get more?: No   Transportation Needs: Low Risk  (6/14/2024)    Transportation Needs     Within the past 12 months, has lack of transportation kept you from medical appointments, getting your  medicines, non-medical meetings or appointments, work, or from getting things that you need?: No   Physical Activity: Unknown (6/14/2024)    Exercise Vital Sign     Days of Exercise per Week: 6 days     Minutes of Exercise per Session: Not on file   Stress: No Stress Concern Present (6/14/2024)    [employer] - Occupational Stress Questionnaire     Feeling of Stress : Not at all   Social Connections: Unknown (6/14/2024)    Social Connection and Isolation Panel [NHANES]     Frequency of Communication with Friends and Family: Not on file     Frequency of Social Gatherings with Friends and Family: More than three times a week     Attends Quaker Services: Not on file     Active Member of Clubs or Organizations: Not on file     Attends Club or Organization Meetings: Not on file     Marital Status: Not on file   Interpersonal Safety: Low Risk  (6/14/2024)    Interpersonal Safety     Do you feel physically and emotionally safe where you currently live?: Yes     Within the past 12 months, have you been hit, slapped, kicked or otherwise physically hurt by someone?: No     Within the past 12 months, have you been humiliated or emotionally abused in other ways by your partner or ex-partner?: No   Housing Stability: Low Risk  (6/14/2024)    Housing Stability     Do you have housing? : Yes     Are you worried about losing your housing?: No       FAMILY HISTORY  Family History   Problem Relation Age of Onset    Colon Cancer Mother     Coronary Artery Disease Mother     Diabetes Mother     Hypertension Mother     Breast Cancer Sister 60    Brain Cancer Sister     Kidney failure Sister     Diabetes Sister     Diabetes Sister     Breast Cancer Maternal Grandmother 55    Pancreatic Cancer Other        REVIEW OF SYSTEMS  Complete 12 system Review of Systems performed and was negative except for HPI.    VITALS  There were no vitals filed for this visit.    PHYSICAL EXAMINATION   General: Age appropriate  "appearing, no acute distress  HEENT: normocephalic, atraumatic, sclera non-icteric  Skin: No open skin lesion noted in visible areas.  Respiratory: Non labored breathing, No wheezes.  Cardiac/Vessels: No edema, cyanosis, clubbing noted in all extremities.  Lymph: No palpable lymph node swelling noted around the affected area.  Mental: There was no signs of aberrant behaviors noted. Patient was pleasant throughout the encounter.    Functional Movements: Non-antalgic gait appreciated.      SHOULDER:   Inspection: No notable asymmetry appreciated upon exam bilaterally  Palpation: TTP left AC joint, bicipital groove, anterior and posterior glenohumeral area.  Range of Motion (Estimated, Active unless otherwise noted, L/R):   Flexion (normal = 170-180 degrees): 100/normal  Abduction (normal = 170-180 degrees): 100/normal  External Rotation (normal = 90 degrees): 45/normal  Internal Rotation: Reaching hands behind back, with right thumb reaches T7 & left thumb reached back pocket    Special Testing:   Neers (+)  Empty Can (+)  Quintanilla (+)  Speeds (+)  Scarf test (+)  O'puneet's (+)    IMAGING STUDIES:  11/29/2024 Left shoulder radiograph: \": There is age indeterminant widening the AC joint, favor chronic. Curvilinear mineralization within the soft tissues along the acromiohumeral interval and adjacent to the greater tuberosity of the humerus likely represents rotator cuff mineralization from degeneration or hydroxyapatite deposition. The bones are demineralized. Mild glenohumeral joint degenerative change. No evidence of an acute fracture or dislocation. Tortuous aorta.\"    I personally reviewed these images and agree with the radiology report and shared the findings with the patient.    IMPRESSION  Berta Witt is a very pleasant 84 year old right-hand-dominant female who is presenting today with left shoulder pain that seems related primarily to to rotator cuff calcific tendinosis.  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    PLAN  The following was discussed with the patient:  Activity Status Recommended:Activity as tolerated  Imaging ordered: Previous x-rays reviewed as noted above  Rehabilitation: Physical therapy recommended  Bracing/Orthotics: None recommended at this time  US guided injections: Consider Ultrasound guided corticosteroid injection  Referrals: None recommended at this time  Medications: No changes, may use over the counter analgesics as needed  Follow up: 4 weeks in [place]  Resources Provided: Written and verbal information detailing above findings and plan provided including after visit summary.    They were encouraged to message me on [employer] whenever they needed.    The patient was in agreement with this plan. All questions were answered to the best of my ability.    Total time (face-to-face and non-face-to-face) spent on today's visit was 50 minutes. This included preparation for the visit (i.e. reviewing test results), performance of a medically appropriate history and examination, placing orders for medications, tests or other procedures, and discussing the plan of care with the patient. This time is exclusive of procedures performed and time spent teaching.     Aria Nolan MD, [facility]  Sports Medicine Attending Physician  Department of Physical Medicine & Rehabilitation

## 2024-12-04 ENCOUNTER — OFFICE VISIT (OUTPATIENT)
Dept: ORTHOPEDICS | Facility: CLINIC | Age: 84
End: 2024-12-04
Attending: INTERNAL MEDICINE
Payer: COMMERCIAL

## 2024-12-04 VITALS — SYSTOLIC BLOOD PRESSURE: 128 MMHG | DIASTOLIC BLOOD PRESSURE: 78 MMHG

## 2024-12-04 DIAGNOSIS — M75.102 ROTATOR CUFF SYNDROME, LEFT: ICD-10-CM

## 2024-12-04 DIAGNOSIS — M25.512 CHRONIC LEFT SHOULDER PAIN: ICD-10-CM

## 2024-12-04 DIAGNOSIS — G89.29 CHRONIC LEFT SHOULDER PAIN: ICD-10-CM

## 2024-12-04 PROCEDURE — 99204 OFFICE O/P NEW MOD 45 MIN: CPT | Performed by: STUDENT IN AN ORGANIZED HEALTH CARE EDUCATION/TRAINING PROGRAM

## 2024-12-04 NOTE — PATIENT INSTRUCTIONS
Berta Witt, It was nice to see you in our office today.      DIAGNOSIS:   1. Chronic left shoulder pain    2. Rotator cuff syndrome, left      INSTRUCTIONS FOR FOLLOW-UP CARE:  Activity Status Recommended:Activity as tolerated  Imaging ordered: Previous x-rays reviewed  Rehabilitation: Physical therapy recommended  Bracing/Orthotics: None recommended at this time  US guided injections: Consider Ultrasound guided corticosteroid injection  Referrals: None recommended at this time  Medications: No changes, may use over the counter analgesics as needed  Follow up: 4 weeks in [facility] LOCATIONS:     [place] [address], [address] TRIAGE LINE: [phone]   [place] [address] APPOINTMENTS: [phone]   (Monday & Friday) RADIOLOGY: [phone]    MRI/CT [phone]   [facility]: [phone] [address] BILLING QUESTIONS: [phone]   [place] [address] FAX: [phone]   (Tuesday & Wednesday)        Thank you for choosing [facility]!    If you have any questions, please do not hesitate to reach out on [employer] or Call [phone] and ask for my team.    Aria Nolan MD, [facility] Orthopedics and Sports Medicine

## 2024-12-04 NOTE — LETTER
"12/4/2024      Berta Witt  [address] [place] [address] [place] [id number]      Dear Colleague,    Thank you for referring your patient, Berta Witt, to the [facility] [place]. Please see a copy of my visit note below.    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 Mayes Daily with [facility])  - Right shoulder RC surgery as well    Location & description of pain:  - left anterior shoulder joint.     Exacerbating factors:   - usage, getting dressed  - Active adduction, flexion >90deg, IR, reaching behind back  - washing hair    Remitting factors:  - salon pas, modification    Previous Treatments:  -Medication: Salon Pas patch,   -Rehabilitation: not recently   -Durable Medical Equipment:X  -Injections: X  -Modalities: X  -Other Providers seen: [facility] (?) for surgeries    Sports, Hobbies, Employment:  - retired  - volunteer [employer]  - likes walking- not impacted    Average hours of sleep per night: 8 hours, sleep not impacted by pain    Average minutes of exercise per day: Daily 30 mins or more walking.     Area of Problem  12/4/2024  Date 2 Date 3 Date 4 Date 5   Function Ability in last week - % of Baseline (0 is worst & 100 is best)        Sport/Activity Ability in last week - % of Baseline (0 is worst & 100 is best)        Pain Level in the last week (0 is best & 10 is worst)  4/10 to 5/10         Additional Information of consideration:  -Numbness/paresthesias in " extremities?None    MEDICATIONS    Current Outpatient Medications:      Continuous Glucose Sensor (FREESTYLE MODESTO 2 SENSOR) MISC, Change sensor every 14 days., Disp: 2 each, Rfl: 11     Continuous Glucose Sensor (FREESTYLE MODESTO 3 PLUS SENSOR) MISC, Change every 15 days., Disp: 6 each, Rfl: 11     desonide (DESOWEN) 0.05 % external cream, APPLY TO THE AREA IN GROIN 2X DAILY FOR 2 WEEKS, TAKE A 2 WEEK BREAK, THEN REPEAT AS NEEDED FOR FLARES., Disp: , Rfl:      diclofenac (VOLTAREN) 1 % topical gel, Apply 2 g topically 4 times daily., Disp: 150 g, Rfl: 5     empagliflozin (JARDIANCE) 10 MG TABS tablet, Take 1 tablet (10 mg) by mouth daily., Disp: 90 tablet, Rfl: 3     glipiZIDE (GLUCOTROL XL) 5 MG 24 hr tablet, Take 1 tablet (5 mg) by mouth daily, Disp: 90 tablet, Rfl: 3     hydrochlorothiazide (HYDRODIURIL) 25 MG tablet, Take 1 tablet (25 mg) by mouth daily, Disp: , Rfl:      lisinopril (ZESTRIL) 40 MG tablet, Take 1 tablet (40 mg) by mouth daily, Disp: 90 tablet, Rfl: 4     Multiple Vitamins-Minerals (MULTIVITAMIN ADULTS PO), Take by mouth daily, Disp: , Rfl:      Nutritional Supplements (HIGH-PROTEIN NUTRITIONAL SHAKE) LIQD, Take 11 oz by mouth daily This is [email or web address] nutrition plan. It has 30 gm of proteins., Disp: , Rfl:      potassium chloride howie ER (KLOR-CON M20) 20 MEQ CR tablet, Take 1 tablet (20 mEq) by mouth 2 times daily., Disp: 90 tablet, Rfl: 2     simvastatin (ZOCOR) 10 MG tablet, TAKE 1 TABLET AT BEDTIME, Disp: 90 tablet, Rfl: 1     UNABLE TO FIND, Take 1 packet by mouth daily MEDICATION NAME: Java Burn. This is for weight loss. It has a number of vitamins, mineral and extracts. One serving is a packet of 2.5 gm, Disp: , Rfl:     ALLERGIES  Allergies   Allergen Reactions     Penicillins Hives       PAST MEDICAL HISTORY  Past Medical History:   Diagnosis Date     Abscess of right axilla      Colon polyp 9/2004    Benign     Diabetes mellitus (H)     Type 2     Heart murmur April 2004    I/VI systolic  ejection murmur     Hemorrhoids      Hyperlipidemia      Hypertension      Hypokalemia      Left ankle sprain 1/12/11     Lumbar back pain      Spinal stenosis      Syncope 2011    single spontaneous episode       PAST SURGICAL HISTORY  Past Surgical History:   Procedure Laterality Date     ARTHROSCOPY SHOULDER ROTATOR CUFF REPAIR Bilateral      BACK SURGERY       LUMBAR LAMINECTOMY Bilateral 8/12/2014    Procedure: BILATERAL L3-5 DECOMPRESSION LAMINECTOMY;  Surgeon: Eric Vásquez MD;  Location: [facility];  Service:      OTHER SURGICAL HISTORY Right     fatty tumor excisionright axilla     POLYPECTOMY       ZZC EXCIS CERV DISK,ONE LEVEL      Description: Laminectomy With Disc Removal;  Recorded: 08/26/2014;     ZZC BENEDICT W/O FACETEC FORAMOT/DSKC 1/2 VRT SEG, CERVICAL      Description: Laminectomy Lumbar;  Proc Date: 08/01/2014;  Comments: bilateral L3-L5       SOCIAL HISTORY  Social History     Socioeconomic History     Marital status:      Spouse name: Not on file     Number of children: 2     Years of education: Not on file     Highest education level: Not on file   Occupational History     Not on file   Tobacco Use     Smoking status: Never     Smokeless tobacco: Never   Vaping Use     Vaping status: Never Used   Substance and Sexual Activity     Alcohol use: No     Drug use: No     Sexual activity: Not on file   Other Topics Concern     Not on file   Social History Narrative    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[place]        2 children, 2 grandchildren    Originally from [place]         Social Drivers of Health     Financial Resource Strain: Low Risk  (6/14/2024)    Financial Resource Strain      Within the past 12 months, have you or your family members you live with been unable to get utilities (heat, electricity) when it was really needed?: No   Food Insecurity: Low Risk  (6/14/2024)    Food Insecurity      Within the past 12 months, did you worry that your food would run out before you got money  to buy more?: No      Within the past 12 months, did the food you bought just not last and you didn t have money to get more?: No   Transportation Needs: Low Risk  (6/14/2024)    Transportation Needs      Within the past 12 months, has lack of transportation kept you from medical appointments, getting your medicines, non-medical meetings or appointments, work, or from getting things that you need?: No   Physical Activity: Unknown (6/14/2024)    Exercise Vital Sign      Days of Exercise per Week: 6 days      Minutes of Exercise per Session: Not on file   Stress: No Stress Concern Present (6/14/2024)    [employer] - Occupational Stress Questionnaire      Feeling of Stress : Not at all   Social Connections: Unknown (6/14/2024)    Social Connection and Isolation Panel [NHANES]      Frequency of Communication with Friends and Family: Not on file      Frequency of Social Gatherings with Friends and Family: More than three times a week      Attends Anabaptism Services: Not on file      Active Member of Clubs or Organizations: Not on file      Attends Club or Organization Meetings: Not on file      Marital Status: Not on file   Interpersonal Safety: Low Risk  (6/14/2024)    Interpersonal Safety      Do you feel physically and emotionally safe where you currently live?: Yes      Within the past 12 months, have you been hit, slapped, kicked or otherwise physically hurt by someone?: No      Within the past 12 months, have you been humiliated or emotionally abused in other ways by your partner or ex-partner?: No   Housing Stability: Low Risk  (6/14/2024)    Housing Stability      Do you have housing? : Yes      Are you worried about losing your housing?: No       FAMILY HISTORY  Family History   Problem Relation Age of Onset     Colon Cancer Mother      Coronary Artery Disease Mother      Diabetes Mother      Hypertension Mother      Breast Cancer Sister 60     Brain Cancer Sister      Kidney  "failure Sister      Diabetes Sister      Diabetes Sister      Breast Cancer Maternal Grandmother 55     Pancreatic Cancer Other        REVIEW OF SYSTEMS  Complete 12 system Review of Systems performed and was negative except for HPI.    VITALS  There were no vitals filed for this visit.    PHYSICAL EXAMINATION   General: Age appropriate appearing, no acute distress  HEENT: normocephalic, atraumatic, sclera non-icteric  Skin: No open skin lesion noted in visible areas.  Respiratory: Non labored breathing, No wheezes.  Cardiac/Vessels: No edema, cyanosis, clubbing noted in all extremities.  Lymph: No palpable lymph node swelling noted around the affected area.  Mental: There was no signs of aberrant behaviors noted. Patient was pleasant throughout the encounter.    Functional Movements: Non-antalgic gait appreciated.      SHOULDER:   Inspection: No notable asymmetry appreciated upon exam bilaterally  Palpation: TTP left AC joint, bicipital groove, anterior and posterior glenohumeral area.  Range of Motion (Estimated, Active unless otherwise noted, L/R):   Flexion (normal = 170-180 degrees): 100/normal  Abduction (normal = 170-180 degrees): 100/normal  External Rotation (normal = 90 degrees): 45/normal  Internal Rotation: Reaching hands behind back, with right thumb reaches T7 & left thumb reached back pocket    Special Testing:   Neers (+)  Empty Can (+)  Quintanilla (+)  Speeds (+)  Scarf test (+)  O'puneet's (+)    IMAGING STUDIES:  11/29/2024 Left shoulder radiograph: \": There is age indeterminant widening the AC joint, favor chronic. Curvilinear mineralization within the soft tissues along the acromiohumeral interval and adjacent to the greater tuberosity of the humerus likely represents rotator cuff mineralization from degeneration or hydroxyapatite deposition. The bones are demineralized. Mild glenohumeral joint degenerative change. No evidence of an acute fracture or dislocation. Tortuous aorta.\"    I personally " reviewed these images and agree with the radiology report and shared the findings with the patient.    IMPRESSION  Berta Witt is a very pleasant 84 year old right-hand-dominant female who is presenting today with left shoulder pain that seems related primarily to to rotator cuff calcific tendinosis.  She does have glenohumeral and acromiclavicular joint arthritis that is also contributing.    PLAN  The following was discussed with the patient:  Activity Status Recommended:Activity as tolerated  Imaging ordered: Previous x-rays reviewed as noted above  Rehabilitation: Physical therapy recommended  Bracing/Orthotics: None recommended at this time  US guided injections: Consider Ultrasound guided corticosteroid injection  Referrals: None recommended at this time  Medications: No changes, may use over the counter analgesics as needed  Follow up: 4 weeks in [place]  Resources Provided: Written and verbal information detailing above findings and plan provided including after visit summary.    They were encouraged to message me on [employer] whenever they needed.    The patient was in agreement with this plan. All questions were answered to the best of my ability.    Total time (face-to-face and non-face-to-face) spent on today's visit was 50 minutes. This included preparation for the visit (i.e. reviewing test results), performance of a medically appropriate history and examination, placing orders for medications, tests or other procedures, and discussing the plan of care with the patient. This time is exclusive of procedures performed and time spent teaching.     Aria Nolan MD, [facility]  Sports Medicine Attending Physician  Department of Physical Medicine & Rehabilitation       Again, thank you for allowing me to participate in the care of your patient.        Sincerely,        Aria Nolan MD

## 2024-12-07 DIAGNOSIS — N18.32 STAGE 3B CHRONIC KIDNEY DISEASE (H): ICD-10-CM

## 2024-12-08 ENCOUNTER — TELEPHONE (OUTPATIENT)
Dept: INTERNAL MEDICINE | Facility: CLINIC | Age: 84
End: 2024-12-08
Payer: COMMERCIAL

## 2024-12-09 DIAGNOSIS — N18.32 STAGE 3B CHRONIC KIDNEY DISEASE (H): ICD-10-CM

## 2024-12-10 DIAGNOSIS — N18.30 CONTROLLED TYPE 2 DIABETES MELLITUS WITH STAGE 3 CHRONIC KIDNEY DISEASE, WITHOUT LONG-TERM CURRENT USE OF INSULIN (H): ICD-10-CM

## 2024-12-10 DIAGNOSIS — E11.22 CONTROLLED TYPE 2 DIABETES MELLITUS WITH STAGE 3 CHRONIC KIDNEY DISEASE, WITHOUT LONG-TERM CURRENT USE OF INSULIN (H): ICD-10-CM

## 2024-12-11 RX ORDER — SIMVASTATIN 10 MG
TABLET ORAL
Qty: 90 TABLET | Refills: 3 | Status: SHIPPED | OUTPATIENT
Start: 2024-12-11

## 2024-12-11 NOTE — TELEPHONE ENCOUNTER
Spoke with patient and relayed information below from Dr Lucero. Patient verbalized understanding and has no further questions.

## 2024-12-11 NOTE — TELEPHONE ENCOUNTER
"Patient reports \" I have been taking it some how [facility] scripts sent extra medicine.\" Patient takes her simvastatin daily as prescribed. She would like refills sent in.   "

## 2025-01-06 ENCOUNTER — TELEPHONE (OUTPATIENT)
Dept: NEPHROLOGY | Facility: CLINIC | Age: 85
End: 2025-01-06

## 2025-01-06 ENCOUNTER — THERAPY VISIT (OUTPATIENT)
Dept: PHYSICAL THERAPY | Facility: REHABILITATION | Age: 85
End: 2025-01-06
Attending: INTERNAL MEDICINE
Payer: COMMERCIAL

## 2025-01-06 DIAGNOSIS — M25.512 CHRONIC LEFT SHOULDER PAIN: ICD-10-CM

## 2025-01-06 DIAGNOSIS — G89.29 CHRONIC LEFT SHOULDER PAIN: ICD-10-CM

## 2025-01-06 DIAGNOSIS — M75.102 ROTATOR CUFF SYNDROME, LEFT: ICD-10-CM

## 2025-01-06 PROCEDURE — 97161 PT EVAL LOW COMPLEX 20 MIN: CPT | Mod: GP | Performed by: PHYSICAL THERAPIST

## 2025-01-06 PROCEDURE — 97110 THERAPEUTIC EXERCISES: CPT | Mod: GP | Performed by: PHYSICAL THERAPIST

## 2025-01-06 ASSESSMENT — ACTIVITIES OF DAILY LIVING (ADL)
TOUCHING_THE_BACK_OF_YOUR_NECK: 6
AT_ITS_WORST?: 6
PLEASE_INDICATE_YOR_PRIMARY_REASON_FOR_REFERRAL_TO_THERAPY:: SHOULDER
WASHING_YOUR_HAIR?: 10
PUTTING_ON_YOUR_PANTS: 8
PUTTING_ON_A_SHIRT_THAT_BUTTONS_DOWN_THE_FRONT: 9
WASHING_YOUR_BACK: 10
PUTTING_ON_AN_UNDERSHIRT_OR_A_PULLOVER_SWEATER: 9

## 2025-01-06 NOTE — TELEPHONE ENCOUNTER
Left Voicemail (1st Attempt) and Sent Mychart (1st Attempt) for the patient to call back and schedule the following:    Appointment type: Return Nephrology  Provider: Dr. Nguyen  Return date: April 2025  Specialty phone number: [phone]  Additional appointment(s) needed: Labs 1hr prior to appt  Additonal Notes: NA

## 2025-01-06 NOTE — PROGRESS NOTES
PHYSICAL THERAPY EVALUATION  Type of Visit: Evaluation       Fall Risk Screen:  Fall screen completed by: PT  Have you fallen 2 or more times in the past year?: (Patient-Rptd) No  Have you fallen and had an injury in the past year?: (Patient-Rptd) No  Is patient a fall risk?: No  Fall screen comments: slow steady gait    Subjective   B repair on RTC, L shoulder pain currently, difficulty washing hair, dressing, and reaching started late last year. Lifting and carrying groceries difficult tends to avoid use of L UE. Volunteering not needing to use arms for volunteering. 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      Presenting condition or subjective complaint: (Patient-Rptd) left shoulder pain  Date of onset:      Relevant medical history:     Past Medical History:   Diagnosis Date    Abscess of right axilla     Colon polyp 9/2004    Benign    Diabetes mellitus (H)     Type 2    Heart murmur April 2004    I/VI systolic ejection murmur    Hemorrhoids     Hyperlipidemia     Hypertension     Hypokalemia     Left ankle sprain 1/12/11    Lumbar back pain     Spinal stenosis     Syncope 2011    single spontaneous episode      Patient Active Problem List   Diagnosis    Vitamin D Deficiency    Hyperlipidemia    Hemorrhoids    Spinal stenosis, lumbar region, with neurogenic claudication    Hypokalemia    Essential hypertension with goal blood pressure less than 140/90    Controlled type 2 diabetes mellitus with stage 3 chronic kidney disease, without long-term current use of insulin (H)    Body mass index (bmi) 29.0-29.9, adult    Stage 3b chronic kidney disease (H)      Dates & types of surgery: (Patient-Rptd) rotatercuff  Past Surgical History:   Procedure Laterality Date    ARTHROSCOPY SHOULDER ROTATOR CUFF REPAIR Bilateral     BACK SURGERY      LUMBAR LAMINECTOMY Bilateral 8/12/2014    Procedure: BILATERAL L3-5 DECOMPRESSION LAMINECTOMY;  Surgeon: Eric Vásquez MD;  Location: [facility];  Service:      OTHER SURGICAL HISTORY Right     fatty tumor excisionright axilla    POLYPECTOMY      [facility] EXCIS CERV DISK,ONE LEVEL      Description: Laminectomy With Disc Removal;  Recorded: 08/26/2014;    [facility] BENEDICT W/O FACETEC FORAMOT/CHECOKC 1/2 VRT SEG, CERVICAL      Description: Laminectomy Lumbar;  Proc Date: 08/01/2014;  Comments: bilateral L3-L5      Prior diagnostic imaging/testing results: (Patient-Rptd) X-ray     IMPRESSION: There is age indeterminant widening the AC joint, favor chronic. Curvilinear mineralization within the soft tissues along the acromiohumeral interval and adjacent to the greater tuberosity of the humerus likely represents rotator cuff   mineralization from degeneration or hydroxyapatite deposition. The bones are demineralized. Mild glenohumeral joint degenerative change. No evidence of an acute fracture or dislocation. Tortuous aorta.  Prior therapy history for the same diagnosis, illness or injury: (Patient-Rptd) No      Prior Level of Function  Transfers: Independent  Ambulation: Independent  ADL: Independent  IADL: Driving, Finances, Housekeeping, Laundry, Meal preparation, Medication management    Living Environment  Social support: (Patient-Rptd) Alone   Type of home: (Patient-Rptd) Apartment/condo; 1 level   Stairs to enter the home:         Ramp: (Patient-Rptd) No   Stairs inside the home: (Patient-Rptd) No       Help at home: (Patient-Rptd) None  Equipment owned:       Employment: (Patient-Rptd) No    Hobbies/Interests: (Patient-Rptd) volunteer    Patient goals for therapy:      Pain assessment: See objective evaluation for additional pain details     Objective   SHOULDER EVALUATION  PAIN: Pain Level at Rest: 0/10  Pain Level with Use: 9/10  Pain Location: shoulder  Pain Quality: Stabbing  Pain Frequency: intermittent  Pain is Worst: activity  Pain is Exacerbated By: moving left arm  Pain is Relieved By: rest and salonpas  Pain Progression: Improved  INTEGUMENTARY (edema, incisions): WFL  POSTURE:  Standing Posture: Rounded shoulders, Forward head  Sitting Posture: Rounded shoulders, Forward head    ROM:   (Degrees) Left AROM Left PROM Right AROM  Right PROM   Shoulder Flexion 110 pain anterior/superior  135    Shoulder Extension 50 anterior shoulder  50    Shoulder Abduction 92 pain anterior/superior  125    Shoulder Adduction       Shoulder Internal Rotation 60 supine  50    Shoulder External Rotation 40  40    Shoulder Horizontal Abduction       Shoulder Horizontal Adduction       Shoulder Flexion ER       Shoulder Flexion IR       Elbow Extension       Elbow Flexion       Pain:   End feel:     STRENGTH:  L shoulder strength 2/5 flex, ext, abd, IR, & ER, R shoulder flex 5/5, abd 4/5, ext 4/5, ER 3+/5, and IR 4/5  FLEXIBILITY:  assess future visit  SPECIAL TESTS:  see Sports ortho visit, not assessed this visit, assess in future as indicated  PALPATION:  TTP supraspinatus, biceps tendon  JOINT MOBILITY:  assess in future  CERVICAL SCREEN:  future as indicated    Assessment & Plan   CLINICAL IMPRESSIONS  Medical Diagnosis:      Treatment Diagnosis:     Impression/Assessment: Patient is a 84 year old female with L shoulder pain and impairment in functional mobility.  The following significant findings have been identified: Pain, Decreased ROM/flexibility, Decreased joint mobility, Decreased strength, Decreased proprioception, Impaired gait, Impaired muscle performance, Decreased activity tolerance, and Impaired posture. These impairments interfere with their ability to perform self care tasks, recreational activities, household chores, household mobility, and community mobility as compared to previous level of function.     Clinical Decision Making (Complexity):  Clinical Presentation: Stable/Uncomplicated  Clinical Presentation Rationale: based on medical and personal factors listed in PT evaluation  Clinical Decision Making (Complexity): Low complexity    PLAN OF CARE  Treatment  Interventions:  Interventions: Gait Training, Manual Therapy, Neuromuscular Re-education, Therapeutic Activity, Therapeutic Exercise, Self-Care/Home Management    Long Term Goals            Frequency of Treatment:    Duration of Treatment:      Recommended Referrals to Other Professionals:  none at this time  Education Assessment:        Risks and benefits of evaluation/treatment have been explained.   Patient/Family/caregiver agrees with Plan of Care.     Evaluation Time:             Signing Clinician: MALA Lawson [facility]                                                                                   OUTPATIENT PHYSICAL THERAPY      PLAN OF TREATMENT FOR OUTPATIENT REHABILITATION   Patient's Last Name, First Name, Berta Smith YOB: 1940   Provider's Name   [facility]   Medical Record No.  [id number]     Onset Date:    Start of Care Date:       Medical Diagnosis:         PT Treatment Diagnosis:    Plan of Treatment  Frequency/Duration:  /      Certification date from   to           See note for plan of treatment details and functional goals     Sammie Zamudio PT                         I CERTIFY THE NEED FOR THESE SERVICES FURNISHED UNDER        THIS PLAN OF TREATMENT AND WHILE UNDER MY CARE     (Physician attestation of this document indicates review and certification of the therapy plan).              Referring Provider:  Dianna Lucero    Initial Assessment  See Epic Evaluation-

## 2025-01-07 NOTE — PATIENT INSTRUCTIONS
Group Topic: BH Activity Group    Date: 1/6/2025  Start Time: 2040  End Time: 2150  Facilitators: Diane Rogers  Focus: Patients were asked their current emotional state and were asked to elaborate on their feelings. Tonight's topic was on \"things to be grateful for everyday.\" Patients were given a worksheet which gave specific examples like: health, work, people and your surroundings which can make you have a more optimistic perspective on life. Patients were asked to fill out reflection questions and a discussion took place. The objective of this group was for patients to try their best to understand that even in the darkest of times, they can find one small thing to be grateful for that may help them to keep going and help their healing process.    Number in attendance: 5  Pt came to the group initially, but then left and never returned. Pt appeared anxious.    Labs today. If sugars are above goal, we can consider adding Jardiance pill to metformin.    2. Have tetanus booster done at the pharmacy.    3. Have eye exam done and records forwarded to us.

## 2025-01-08 NOTE — TELEPHONE ENCOUNTER
Please check with patient on the dosage of Jardiance.    Looks like nephrology increase her dose from 10 to 25 mg back in October.  Was she taking 2-1/2 tablets of 10 mg?    We can just send a prescription for 25 mg tablets if she is agreeable.

## 2025-01-09 NOTE — TELEPHONE ENCOUNTER
Spoke with patient who states Nephrology prescribed previous prescription however insurance did not cover.    Chart review of visit with PharmD 10/25:    Patient is following renal and they increased the dose to 25 mg; however, the 10 mg is clinically appropriate for renal protection;unsure of the motivation for increasing the dose. Will communicate with renal.       Patient confirms she has been taking Jardiance 10 mg- 1 tablet daily.    Informed refills on file at pharmacy of 10 mg prescription. If any changes recommended by PharmD or PCP clinic will return call, otherwise continue current regimen.

## 2025-01-09 NOTE — TELEPHONE ENCOUNTER
We have agreed to use the 10 mg tablet for kidney protection. Have communicated with patient and renal sometime ago. I wanted to be careful with her renal function. Ordered 10 mg in December.    Thanks,  Velma

## 2025-01-22 ENCOUNTER — NURSE TRIAGE (OUTPATIENT)
Dept: INTERNAL MEDICINE | Facility: CLINIC | Age: 85
End: 2025-01-22
Payer: COMMERCIAL

## 2025-01-22 NOTE — TELEPHONE ENCOUNTER
Spoke with patient and relayed information below from Dr Cueva. Patient verbalized understanding and has no further questions.

## 2025-01-22 NOTE — TELEPHONE ENCOUNTER
"Nurse Triage SBAR    Is this a 2nd Level Triage? YES, LICENSED PRACTITIONER REVIEW IS REQUIRED    Situation: Breathing Changes     Background: Reports this happened this past summer x1 and then resolved.    Assessment: Patient reporting over the past week when moving from one place to another experiences episodes of needing to take \"A lot of deep breaths.\" Noted difficulty describing symptoms at time of call. Patient notes with getting up to go the bathroom or when doing laundry specifically episodes occur. Reports episodes resolve after deep breaths. Denies dizziness or light headedness with episodes. Denies chest pain or heart palpitations/fluttering.Denies vision changes.    Reports feeling stressed with family and world situations.    Reports runny nose of and on for the last month. Otherwise denies recent illness.    Last episode on hour ago. Reports episodes occur several times a day, 5+ times.    Protocol Recommended Disposition:   Go To Office Now    Recommendation: Disposition is see in office today. No available appointments. Declined UC. Agreeable to see provider tomorrow in clinic as patient is coming for PT.    Requesting recommendation on OTC medication for runny nose. Curently using saline nasal spray that patient reports is effective for a short period of time.     Routed to provider    Does the patient meet one of the following criteria for ADS visit consideration? 16+ years old, with an [facility] PCP     TIP  Providers, please consider if this condition is appropriate for management at one of our Acute and Diagnostic Services sites.     If patient is a good candidate, please use dotphrase <dot>triageresponse and select Refer to ADS to document.      Reason for Disposition   MILD difficulty breathing (e.g., minimal/no SOB at rest, SOB with walking, pulse < 100) of new-onset or worse than normal    Additional Information   Negative: MODERATE difficulty breathing (e.g., speaks in phrases, SOB even at " "rest, pulse 100-120) of new-onset or worse than normal   Negative: Oxygen level (e.g., pulse oximetry) 90% or lower   Negative: Wheezing can be heard across the room   Negative: Drooling or spitting out saliva (because can't swallow)   Negative: Any history of prior \"blood clot\" in leg or lungs   Negative: Illness requiring prolonged bedrest in past month (e.g., immobilization, long hospital stay)   Negative: Hip or leg fracture (broken bone) in past month (or had cast on leg or ankle in past month)   Negative: Major surgery in the past month   Negative: Long-distance travel in past month (e.g., car, bus, train, plane; with trip lasting 6 or more hours)   Negative: Cancer treatment in past six months (or has cancer now)   Negative: Extra heartbeats, irregular heart beating, or heart is beating very fast (i.e., 'palpitations')   Negative: Fever > 103 F (39.4 C)   Negative: Fever > 101 F (38.3 C) and over 60 years of age   Negative: Fever > 100 F (37.8 C) and bedridden (e.g., nursing home patient, stroke, chronic illness, recovering from surgery)   Negative: Fever > 100 F (37.8 C) and diabetes mellitus or weak immune system (e.g., HIV positive, cancer chemo, splenectomy, organ transplant, chronic steroids)   Negative: Periods where breathing stops and then resumes normally and bedridden (e.g., nursing home patient, CVA)   Negative: Pregnant or postpartum (from 0 to 6 weeks after delivery)   Negative: Patient sounds very sick or weak to the triager    Protocols used: Breathing Difficulty-A-OH    "

## 2025-01-22 NOTE — TELEPHONE ENCOUNTER
Provider Response to 2nd Level Triage Request    I have reviewed the RN documentation. My recommendation is:  Face To Face Visit. Next Day: to be seen by another provider in same service line  If no availability, then recommend urgent care    Darci Cueva MD on 1/22/2025 at 3:02 PM

## 2025-01-23 ENCOUNTER — OFFICE VISIT (OUTPATIENT)
Dept: INTERNAL MEDICINE | Facility: CLINIC | Age: 85
End: 2025-01-23
Payer: COMMERCIAL

## 2025-01-23 VITALS
SYSTOLIC BLOOD PRESSURE: 134 MMHG | WEIGHT: 170 LBS | BODY MASS INDEX: 28.32 KG/M2 | HEART RATE: 82 BPM | HEIGHT: 65 IN | TEMPERATURE: 97.7 F | DIASTOLIC BLOOD PRESSURE: 82 MMHG | RESPIRATION RATE: 18 BRPM | OXYGEN SATURATION: 98 %

## 2025-01-23 DIAGNOSIS — R06.02 SHORTNESS OF BREATH: Primary | ICD-10-CM

## 2025-01-23 DIAGNOSIS — E11.22 CONTROLLED TYPE 2 DIABETES MELLITUS WITH STAGE 3 CHRONIC KIDNEY DISEASE, WITHOUT LONG-TERM CURRENT USE OF INSULIN (H): ICD-10-CM

## 2025-01-23 DIAGNOSIS — N18.32 STAGE 3B CHRONIC KIDNEY DISEASE (H): ICD-10-CM

## 2025-01-23 DIAGNOSIS — N18.30 CONTROLLED TYPE 2 DIABETES MELLITUS WITH STAGE 3 CHRONIC KIDNEY DISEASE, WITHOUT LONG-TERM CURRENT USE OF INSULIN (H): ICD-10-CM

## 2025-01-23 DIAGNOSIS — I35.0 AORTIC VALVE STENOSIS, ETIOLOGY OF CARDIAC VALVE DISEASE UNSPECIFIED: ICD-10-CM

## 2025-01-23 LAB
ATRIAL RATE - MUSE: 78 BPM
BASOPHILS # BLD AUTO: 0 10E3/UL (ref 0–0.2)
BASOPHILS NFR BLD AUTO: 0 %
DIASTOLIC BLOOD PRESSURE - MUSE: NORMAL MMHG
EOSINOPHIL # BLD AUTO: 0.2 10E3/UL (ref 0–0.7)
EOSINOPHIL NFR BLD AUTO: 4 %
ERYTHROCYTE [DISTWIDTH] IN BLOOD BY AUTOMATED COUNT: 13.9 % (ref 10–15)
HCT VFR BLD AUTO: 43.2 % (ref 35–47)
HGB BLD-MCNC: 14.1 G/DL (ref 11.7–15.7)
IMM GRANULOCYTES # BLD: 0 10E3/UL
IMM GRANULOCYTES NFR BLD: 0 %
INTERPRETATION ECG - MUSE: NORMAL
LYMPHOCYTES # BLD AUTO: 1.5 10E3/UL (ref 0.8–5.3)
LYMPHOCYTES NFR BLD AUTO: 26 %
MCH RBC QN AUTO: 29.2 PG (ref 26.5–33)
MCHC RBC AUTO-ENTMCNC: 32.6 G/DL (ref 31.5–36.5)
MCV RBC AUTO: 89 FL (ref 78–100)
MONOCYTES # BLD AUTO: 0.6 10E3/UL (ref 0–1.3)
MONOCYTES NFR BLD AUTO: 11 %
NEUTROPHILS # BLD AUTO: 3.3 10E3/UL (ref 1.6–8.3)
NEUTROPHILS NFR BLD AUTO: 58 %
P AXIS - MUSE: 41 DEGREES
PLATELET # BLD AUTO: 155 10E3/UL (ref 150–450)
PR INTERVAL - MUSE: 196 MS
QRS DURATION - MUSE: 106 MS
QT - MUSE: 412 MS
QTC - MUSE: 469 MS
R AXIS - MUSE: -66 DEGREES
RBC # BLD AUTO: 4.83 10E6/UL (ref 3.8–5.2)
SYSTOLIC BLOOD PRESSURE - MUSE: NORMAL MMHG
T AXIS - MUSE: 63 DEGREES
VENTRICULAR RATE- MUSE: 78 BPM
WBC # BLD AUTO: 5.7 10E3/UL (ref 4–11)

## 2025-01-23 ASSESSMENT — PAIN SCALES - GENERAL: PAINLEVEL_OUTOF10: NO PAIN (0)

## 2025-01-23 NOTE — PATIENT INSTRUCTIONS
Get pulse oxymeter at the pharmacy. Oxygen nl at 90% and higher.    Will check labs, chest XR, EKG tiday and go from there.    3. If Shortness of breath is getting worse or you develop chest pain or tightness, come to ER.

## 2025-01-23 NOTE — PROGRESS NOTES
"  Assessment & Plan     Shortness of breath  Due to drop in oxygen with ambulation, concerning for pulmonary process, will start with labs and chest x-ray (patient will have to go to other locations since we do not have chest x-ray onsite today), EKG does show some changes with possible anterolateral infarct.  Will rule out anemia, PE or pneumonia, if all testing is normal would do echocardiogram to further evaluate aortic stenosis and possible referral to cardiology.  - EKG 12-lead, tracing only  - XR Chest 2 Views; Future  - CBC with platelets and differential  - Basic metabolic panel  (Ca, Cl, CO2, Creat, Gluc, K, Na, BUN)  - BNP-N terminal pro  - D dimer, quantitative    Controlled type 2 diabetes mellitus with stage 3 chronic kidney disease, without long-term current use of insulin (H)  A1c was 7.4 in September, will repeat, she is currently on glipizide and Jardiance    Stage 3b chronic kidney disease (H)  Creatinine is around 1.6 on chronic basis, she is on Jardiance    Aortic stenosis  Previous echo was in March 2023 which showed ejection fraction of more than 65% and mild aortic sclerosis    The longitudinal plan of care for the diagnosis(es)/condition(s) as documented were addressed during this visit. Due to the added complexity in care, I will continue to support Berta in the subsequent management and with ongoing continuity of care.     BMI  Estimated body mass index is 28.29 kg/m  as calculated from the following:    Height as of this encounter: 1.651 m (5' 5\").    Weight as of this encounter: 77.1 kg (170 lb).       Subjective   Berta is a 84 year old, presenting for the following health issues:  office visit and Recheck Medication (Pt reports that she having breathing problems, feels like she has to take lots of deep breath's to breath.)      1/23/2025     3:47 PM   Additional Questions   Roomed by john   Accompanied by alone         1/23/2025     3:47 PM   Patient Reported Additional Medications " "  Patient reports taking the following new medications none     History of Present Illness       Reason for visit:  Breathing concern   She is taking medications regularly.     Berta is an 84-year-old patient with history of chronic renal insufficiency, type 2 diabetes, hypertension, hyperlipidemia, spinal stenosis, regular blood donations and intermittent anemia, mild aortic stenosis, he is a retired teacher , she is currently here for acute visit due to shortness of breath with activity.    Symptoms started out of the blue 1 week ago, she noticed that intermittently when she is physically active she is short of breath.  Prior to that she would walk 3 miles 5 days a week but has not been able to do it for the last week.    She denies any upper respiratory infections but does have mild runny nose but no fevers chills sore throat or cough.  No history of asthma or smoking.    No chest pains palpitations or lower extremity edema.    She denies any bleeding but does donate blood and last donation was in December, at that time she was not told that she was anemic.    No recent travel.  She denies any shortness of breath at night and has not needed high number of pillows to sleep comfortable.  She denies palpitations.    She reports similar symptoms for a short period in the summer which resolved on its own.    On exam today vital signs are stable with oxygen at 98% however when we walked down the [place] her oxygen did drop at 85, subsequently was rested recovered into the 90s after 1-2 minutes.    Review of systems: As above      Objective    BP (!) 151/88 (BP Location: Left arm, Patient Position: Sitting, Cuff Size: Adult Large)   Pulse 82   Temp 97.7  F (36.5  C) (Tympanic)   Resp 18   Ht 1.651 m (5' 5\")   Wt 77.1 kg (170 lb)   LMP  (LMP Unknown)   SpO2 98%   Breastfeeding No   BMI 28.29 kg/m    Body mass index is 28.29 kg/m .  Physical Exam   General: well appearing female, alert and oriented x3, " nontoxic-appearing  EYES: Eyelids, conjunctiva, and sclera were normal. Pupils were normal.   HEAD, EARS, NOSE, MOUTH, AND THROAT: no cervical LAD, no thyromegaly or nodules appreciated.  oropharynx is clear.  RESPIRATORY: respirations non labored, CTA bl, no wheezes, rales, no forced expiratory wheezing.  CARDIOVASCULAR: Heart rate and rhythm were normal.  She has slight ejection murmur, regular rhythm there was no peripheral edema. No carotid bruits.  GASTROINTESTINAL: Positive bowel sounds, abdomen is soft, non tender, non distended.     MUSCULOSKELETAL: Muscle mass was normal for age. No joint synovitis or deformity.  LYMPHATIC: There were no enlarged nodes palpable.  SKIN/HAIR/NAILS: Skin color was normal.  No rashes.  NEUROLOGIC: The patient was alert and oriented.  Speech was normal.  There is no facial asymmetry.   PSYCHIATRIC:  Mood and affect were normal.            Signed Electronically by: Dianna Lucero MD

## 2025-01-25 LAB
ATRIAL RATE - MUSE: 78 BPM
DIASTOLIC BLOOD PRESSURE - MUSE: NORMAL MMHG
INTERPRETATION ECG - MUSE: NORMAL
P AXIS - MUSE: 41 DEGREES
PR INTERVAL - MUSE: 196 MS
QRS DURATION - MUSE: 106 MS
QT - MUSE: 412 MS
QTC - MUSE: 469 MS
R AXIS - MUSE: -66 DEGREES
SYSTOLIC BLOOD PRESSURE - MUSE: NORMAL MMHG
T AXIS - MUSE: 63 DEGREES
VENTRICULAR RATE- MUSE: 78 BPM

## 2025-01-27 ENCOUNTER — TELEPHONE (OUTPATIENT)
Dept: INTERNAL MEDICINE | Facility: CLINIC | Age: 85
End: 2025-01-27

## 2025-01-27 NOTE — TELEPHONE ENCOUNTER
FYI - Status Update    Who is Calling: patient    Update: Patient called, is at [facility] since Friday with blood clots in lungs and legs. Patient wanted Rubianer to know.     Does caller want a call/response back: Yes     Could we send this information to you in [employer] or would you prefer to receive a phone call?:   Patient would prefer a phone call   Okay to leave a detailed message?: Yes at Cell number on file:    Telephone Information:   Mobile [phone]

## 2025-01-27 NOTE — TELEPHONE ENCOUNTER
Spoke with pt,  Will follow up after discharge. FHx of blood clots. Will be discharged on coumadin. GFR 25%.

## 2025-01-28 ENCOUNTER — PATIENT OUTREACH (OUTPATIENT)
Dept: CARE COORDINATION | Facility: CLINIC | Age: 85
End: 2025-01-28
Payer: COMMERCIAL

## 2025-01-28 NOTE — PROGRESS NOTES
Clinical Product Navigator CC reviewed chart; patient on payer product coverage.  Review results:      CC notified of external hospitalization via health plan report. Per chart review, Patient is hospitalized at [facility] from 1/25/2025 to Present with a Pulmonary embolism. Patient has already contacted her clinic to schedule follow-up on 1/31/2025. CC will monitor for discharge.  Obtaining further information to determine needs and next steps.    RAIN Chowdary (she/her/hers)  Social Work Clinic Care Coordinator   [facility]  [email or web address]  [phone]

## 2025-01-31 ENCOUNTER — LAB (OUTPATIENT)
Dept: LAB | Facility: CLINIC | Age: 85
End: 2025-01-31
Payer: COMMERCIAL

## 2025-01-31 DIAGNOSIS — I26.94 MULTIPLE SUBSEGMENTAL PULMONARY EMBOLI WITHOUT ACUTE COR PULMONALE (H): ICD-10-CM

## 2025-01-31 DIAGNOSIS — E04.1 THYROID NODULE: ICD-10-CM

## 2025-01-31 DIAGNOSIS — N18.32 STAGE 3B CHRONIC KIDNEY DISEASE (H): ICD-10-CM

## 2025-01-31 PROBLEM — N18.4 CHRONIC KIDNEY DISEASE, STAGE 4 (SEVERE) (H): Status: ACTIVE | Noted: 2025-01-31

## 2025-01-31 LAB
ANION GAP SERPL CALCULATED.3IONS-SCNC: 13 MMOL/L (ref 7–15)
BASOPHILS # BLD AUTO: 0 10E3/UL (ref 0–0.2)
BASOPHILS NFR BLD AUTO: 1 %
BUN SERPL-MCNC: 46.4 MG/DL (ref 8–23)
CALCIUM SERPL-MCNC: 10.3 MG/DL (ref 8.8–10.4)
CHLORIDE SERPL-SCNC: 105 MMOL/L (ref 98–107)
CREAT SERPL-MCNC: 1.62 MG/DL (ref 0.51–0.95)
EGFRCR SERPLBLD CKD-EPI 2021: 31 ML/MIN/1.73M2
EOSINOPHIL # BLD AUTO: 0.2 10E3/UL (ref 0–0.7)
EOSINOPHIL NFR BLD AUTO: 4 %
ERYTHROCYTE [DISTWIDTH] IN BLOOD BY AUTOMATED COUNT: 13.3 % (ref 10–15)
GLUCOSE SERPL-MCNC: 165 MG/DL (ref 70–99)
HCO3 SERPL-SCNC: 23 MMOL/L (ref 22–29)
HCT VFR BLD AUTO: 38.1 % (ref 35–47)
HGB BLD-MCNC: 12.8 G/DL (ref 11.7–15.7)
IMM GRANULOCYTES # BLD: 0 10E3/UL
IMM GRANULOCYTES NFR BLD: 0 %
INR PPP: 1.11 (ref 0.85–1.15)
LYMPHOCYTES # BLD AUTO: 1 10E3/UL (ref 0.8–5.3)
LYMPHOCYTES NFR BLD AUTO: 24 %
MCH RBC QN AUTO: 29.3 PG (ref 26.5–33)
MCHC RBC AUTO-ENTMCNC: 33.6 G/DL (ref 31.5–36.5)
MCV RBC AUTO: 87 FL (ref 78–100)
MONOCYTES # BLD AUTO: 0.5 10E3/UL (ref 0–1.3)
MONOCYTES NFR BLD AUTO: 11 %
NEUTROPHILS # BLD AUTO: 2.6 10E3/UL (ref 1.6–8.3)
NEUTROPHILS NFR BLD AUTO: 60 %
PLATELET # BLD AUTO: 176 10E3/UL (ref 150–450)
POTASSIUM SERPL-SCNC: 3.9 MMOL/L (ref 3.4–5.3)
RBC # BLD AUTO: 4.37 10E6/UL (ref 3.8–5.2)
SODIUM SERPL-SCNC: 141 MMOL/L (ref 135–145)
TSH SERPL DL<=0.005 MIU/L-ACNC: 1.69 UIU/ML (ref 0.3–4.2)
WBC # BLD AUTO: 4.2 10E3/UL (ref 4–11)

## 2025-01-31 PROCEDURE — 85610 PROTHROMBIN TIME: CPT

## 2025-01-31 PROCEDURE — 36415 COLL VENOUS BLD VENIPUNCTURE: CPT

## 2025-01-31 PROCEDURE — 84443 ASSAY THYROID STIM HORMONE: CPT

## 2025-01-31 PROCEDURE — 85025 COMPLETE CBC W/AUTO DIFF WBC: CPT

## 2025-01-31 PROCEDURE — 80048 BASIC METABOLIC PNL TOTAL CA: CPT

## 2025-02-03 ENCOUNTER — TELEPHONE (OUTPATIENT)
Dept: ANTICOAGULATION | Facility: CLINIC | Age: 85
End: 2025-02-03

## 2025-02-03 ENCOUNTER — ANTICOAGULATION THERAPY VISIT (OUTPATIENT)
Dept: ANTICOAGULATION | Facility: CLINIC | Age: 85
End: 2025-02-03

## 2025-02-03 ENCOUNTER — LAB (OUTPATIENT)
Dept: LAB | Facility: CLINIC | Age: 85
End: 2025-02-03
Payer: COMMERCIAL

## 2025-02-03 DIAGNOSIS — I26.94 MULTIPLE SUBSEGMENTAL PULMONARY EMBOLI WITHOUT ACUTE COR PULMONALE (H): Primary | ICD-10-CM

## 2025-02-03 DIAGNOSIS — I26.94 MULTIPLE SUBSEGMENTAL PULMONARY EMBOLI WITHOUT ACUTE COR PULMONALE (H): ICD-10-CM

## 2025-02-03 LAB — INR BLD: 1.5 (ref 0.9–1.1)

## 2025-02-03 PROCEDURE — 36416 COLLJ CAPILLARY BLOOD SPEC: CPT

## 2025-02-03 PROCEDURE — 85610 PROTHROMBIN TIME: CPT

## 2025-02-03 NOTE — PROGRESS NOTES
ANTICOAGULATION MANAGEMENT     Berta PARKINSON Eduar 84 year old female is on warfarin with subtherapeutic INR result. (Goal INR 2.0-3.0)    Recent labs: (last 7 days)     02/03/25  1051   INR 1.5*       ASSESSMENT     Source(s): Chart Review and Patient/Caregiver Call     Warfarin doses taken: Warfarin taken as instructed  Diet: No new diet changes identified  Medication/supplement changes: None noted  New illness, injury, or hospitalization: No  Signs or symptoms of bleeding or clotting: No  Previous result: Subtherapeutic  Additional findings: Bridging with Enoxaparin until  >= 2.0. (takes 60 mg daily in the evening)       PLAN     Recommended plan for no diet, medication or health factor changes affecting INR     Dosing Instructions: booster dose then Increase your warfarin dose (11.1% change) with next INR in 3 days       Summary  As of 2/3/2025      Full warfarin instructions:  2/3: 10 mg; Otherwise 5 mg every Thu; 7.5 mg all other days   Next INR check:  2/6/2025               Telephone call with Berta who verbalizes understanding and agrees to plan    Contact [phone] to schedule and with any changes, questions or concerns.     Education provided: Please call back if any changes to your diet, medications or how you've been taking warfarin    Plan made per [facility] anticoagulation protocol    Tressa Quinones RN  2/3/2025  Anticoagulation Clinic  [employer] for routing messages: mely [facility] patient phone line: [phone]        _______________________________________________________________________     Anticoagulation Episode Summary       Current INR goal:  2.0-3.0   TTR:  --   Target end date:  Indefinite   Send INR reminders to:  JANETH MIDWAY    Indications    Multiple subsegmental pulmonary emboli without acute cor pulmonale (H) [I26.94]             Comments:  --             Anticoagulation Care Providers       Provider Role Specialty Phone number    Dianna Lucero MD Referring Internal  Medicine [phone]

## 2025-02-06 ENCOUNTER — ANTICOAGULATION THERAPY VISIT (OUTPATIENT)
Dept: ANTICOAGULATION | Facility: CLINIC | Age: 85
End: 2025-02-06

## 2025-02-06 ENCOUNTER — LAB (OUTPATIENT)
Dept: LAB | Facility: CLINIC | Age: 85
End: 2025-02-06
Payer: COMMERCIAL

## 2025-02-06 ENCOUNTER — HOSPITAL ENCOUNTER (OUTPATIENT)
Dept: ULTRASOUND IMAGING | Facility: HOSPITAL | Age: 85
End: 2025-02-06
Attending: INTERNAL MEDICINE
Payer: COMMERCIAL

## 2025-02-06 DIAGNOSIS — I26.94 MULTIPLE SUBSEGMENTAL PULMONARY EMBOLI WITHOUT ACUTE COR PULMONALE (H): Primary | ICD-10-CM

## 2025-02-06 DIAGNOSIS — E04.1 THYROID NODULE: ICD-10-CM

## 2025-02-06 DIAGNOSIS — I26.94 MULTIPLE SUBSEGMENTAL PULMONARY EMBOLI WITHOUT ACUTE COR PULMONALE (H): ICD-10-CM

## 2025-02-06 LAB — INR BLD: 1.6 (ref 0.9–1.1)

## 2025-02-06 PROCEDURE — 76536 US EXAM OF HEAD AND NECK: CPT

## 2025-02-06 NOTE — PROGRESS NOTES
ANTICOAGULATION MANAGEMENT     Robbingarrett PARKINSON Eduar 84 year old female is on warfarin with subtherapeutic INR result. (Goal INR 2.0-3.0)    Recent labs: (last 7 days)     02/06/25  0949   INR 1.6*       ASSESSMENT     Source(s): Chart Review and Patient/Caregiver Call     Warfarin doses taken: Warfarin taken as instructed  Diet: No new diet changes identified  Medication/supplement changes: None noted  New illness, injury, or hospitalization: No  Signs or symptoms of bleeding or clotting: No  Previous result: Subtherapeutic  Additional findings: Bridging with Enoxaparin until INR >= 2.0 ; 60 mg daily, did  refills       PLAN     Recommended plan for no diet, medication or health factor changes affecting INR     Dosing Instructions: booster dose then Increase your warfarin dose (20% change) with next INR in 4 days       Summary  As of 2/6/2025      Full warfarin instructions:  2/6: 12.5 mg; Otherwise 10 mg every Mon, Wed, Fri; 7.5 mg all other days   Next INR check:  2/10/2025               Telephone call with Berta who verbalizes understanding and agrees to plan    Lab visit scheduled    Education provided: Please call back if any changes to your diet, medications or how you've been taking warfarin    Plan made with [facility] Pharmacist Yana Quinones, TATIANA  2/6/2025  Anticoagulation Clinic  [employer] for routing messages: mely [facility]ER MIDWAY  [facility] patient phone line: [phone]        _______________________________________________________________________     Anticoagulation Episode Summary       Current INR goal:  2.0-3.0   TTR:  --   Target end date:  Indefinite   Send INR reminders to:  JANETH MIDWAY    Indications    Multiple subsegmental pulmonary emboli without acute cor pulmonale (H) [I26.94]             Comments:  --             Anticoagulation Care Providers       Provider Role Specialty Phone number    Dianna Lucero MD Referring Internal Medicine [phone]

## 2025-02-10 ENCOUNTER — LAB (OUTPATIENT)
Dept: LAB | Facility: CLINIC | Age: 85
End: 2025-02-10
Payer: COMMERCIAL

## 2025-02-10 ENCOUNTER — ANTICOAGULATION THERAPY VISIT (OUTPATIENT)
Dept: ANTICOAGULATION | Facility: CLINIC | Age: 85
End: 2025-02-10

## 2025-02-10 ENCOUNTER — TELEPHONE (OUTPATIENT)
Dept: INTERNAL MEDICINE | Facility: CLINIC | Age: 85
End: 2025-02-10

## 2025-02-10 DIAGNOSIS — I26.94 MULTIPLE SUBSEGMENTAL PULMONARY EMBOLI WITHOUT ACUTE COR PULMONALE (H): ICD-10-CM

## 2025-02-10 DIAGNOSIS — I26.94 MULTIPLE SUBSEGMENTAL PULMONARY EMBOLI WITHOUT ACUTE COR PULMONALE (H): Primary | ICD-10-CM

## 2025-02-10 LAB — INR BLD: 2.4 (ref 0.9–1.1)

## 2025-02-10 PROCEDURE — 36416 COLLJ CAPILLARY BLOOD SPEC: CPT

## 2025-02-10 PROCEDURE — 85610 PROTHROMBIN TIME: CPT

## 2025-02-10 NOTE — TELEPHONE ENCOUNTER
Spoke with Berta. She cannot donate blood while on warfarin which we discussed in acc encounter of today

## 2025-02-10 NOTE — PROGRESS NOTES
ANTICOAGULATION MANAGEMENT     Berta IGGY Eduar 84 year old female is on warfarin with therapeutic INR result. (Goal INR 2.0-3.0)    Recent labs: (last 7 days)     02/10/25  1036   INR 2.4*       ASSESSMENT     Source(s): Chart Review and Patient/Caregiver Call     Warfarin doses taken: Warfarin taken as instructed  Diet: No new diet changes identified  Medication/supplement changes: None noted  New illness, injury, or hospitalization: No  Signs or symptoms of bleeding or clotting: No  Previous result: Subtherapeutic  Additional findings: None and Bridging with Enoxaparin until INR >= 2.0 will stop enoxaparin  She wondered if she can still give blood and she cannot while on warfarin. She is okay with that       PLAN     Recommended plan for no diet, medication or health factor changes affecting INR     Dosing Instructions: Continue your current warfarin dose with next INR in 3 days   stop lovenox    Summary  As of 2/10/2025      Full warfarin instructions:  10 mg every Mon, Wed, Fri; 7.5 mg all other days   Next INR check:  2/13/2025               Telephone call with Berta who verbalizes understanding and agrees to plan    Lab visit scheduled    Education provided: Please call back if any changes to your diet, medications or how you've been taking warfarin    Plan made per [facility] anticoagulation protocol    Tressa Quinones RN  2/10/2025  Anticoagulation Clinic  [employer] for routing messages: mely [facility]  [facility] patient phone line: [phone]        _______________________________________________________________________     Anticoagulation Episode Summary       Current INR goal:  2.0-3.0   TTR:  --   Target end date:  Indefinite   Send INR reminders to:  JANETH MIDWAY    Indications    Multiple subsegmental pulmonary emboli without acute cor pulmonale (H) [I26.94]             Comments:  --             Anticoagulation Care Providers       Provider Role Specialty Phone number    Dianna Lucero MD Referring  Internal Medicine [phone]

## 2025-02-10 NOTE — TELEPHONE ENCOUNTER
Patient Returning Call    Reason for call:  Patient is calling in and would like to speak with INR nurse regarding donating blood later this month. Please call to discuss.     Information relayed to patient:  message placed    Patient has additional questions:  No      Could we send this information to you in [employer] or would you prefer to receive a phone call?:   Patient would prefer a phone call   Okay to leave a detailed message?: Yes at Cell number on file:    Telephone Information:   Mobile [phone]

## 2025-02-11 ENCOUNTER — TELEPHONE (OUTPATIENT)
Dept: INTERNAL MEDICINE | Facility: CLINIC | Age: 85
End: 2025-02-11
Payer: COMMERCIAL

## 2025-02-11 NOTE — TELEPHONE ENCOUNTER
General Call    Contacts       Contact Date/Time Type Contact Phone/Fax    02/11/2025 01:02 PM CST Phone (Incoming) Berta Witt (Self) [phone] (M)          Reason for Call:  Per last conversation, vitamin K supplement has daily dose of 21 percent, does she need to stop taking it or what to do    What are your questions or concerns:  please contact patient    Date of last appointment with provider: na    Could we send this information to you in [employer] or would you prefer to receive a phone call?:   Patient would prefer a phone call   Okay to leave a detailed message?: Yes at Cell number on file:    Telephone Information:   Mobile [phone]

## 2025-02-13 ENCOUNTER — OFFICE VISIT (OUTPATIENT)
Dept: PEDIATRICS | Facility: CLINIC | Age: 85
End: 2025-02-13
Payer: COMMERCIAL

## 2025-02-13 ENCOUNTER — NURSE TRIAGE (OUTPATIENT)
Dept: INTERNAL MEDICINE | Facility: CLINIC | Age: 85
End: 2025-02-13

## 2025-02-13 ENCOUNTER — TELEPHONE (OUTPATIENT)
Dept: INTERNAL MEDICINE | Facility: CLINIC | Age: 85
End: 2025-02-13

## 2025-02-13 ENCOUNTER — ANTICOAGULATION THERAPY VISIT (OUTPATIENT)
Dept: ANTICOAGULATION | Facility: CLINIC | Age: 85
End: 2025-02-13

## 2025-02-13 VITALS
TEMPERATURE: 98.7 F | BODY MASS INDEX: 28.81 KG/M2 | HEART RATE: 85 BPM | WEIGHT: 173.1 LBS | SYSTOLIC BLOOD PRESSURE: 138 MMHG | RESPIRATION RATE: 16 BRPM | OXYGEN SATURATION: 96 % | DIASTOLIC BLOOD PRESSURE: 75 MMHG

## 2025-02-13 DIAGNOSIS — Z79.01 ON WARFARIN AT HOME: ICD-10-CM

## 2025-02-13 DIAGNOSIS — I26.94 MULTIPLE SUBSEGMENTAL PULMONARY EMBOLI WITHOUT ACUTE COR PULMONALE (H): Primary | ICD-10-CM

## 2025-02-13 DIAGNOSIS — N18.32 STAGE 3B CHRONIC KIDNEY DISEASE (H): Primary | ICD-10-CM

## 2025-02-13 DIAGNOSIS — Z86.711 HISTORY OF PULMONARY EMBOLISM: ICD-10-CM

## 2025-02-13 DIAGNOSIS — I82.412 ACUTE DEEP VEIN THROMBOSIS (DVT) OF FEMORAL VEIN OF LEFT LOWER EXTREMITY (H): ICD-10-CM

## 2025-02-13 DIAGNOSIS — I82.4Z2 ACUTE DEEP VEIN THROMBOSIS (DVT) OF DISTAL VEIN OF LEFT LOWER EXTREMITY (H): ICD-10-CM

## 2025-02-13 DIAGNOSIS — M79.662 PAIN OF LEFT LOWER LEG: Primary | ICD-10-CM

## 2025-02-13 DIAGNOSIS — N18.4 CKD (CHRONIC KIDNEY DISEASE) STAGE 4, GFR 15-29 ML/MIN (H): ICD-10-CM

## 2025-02-13 LAB
ALBUMIN SERPL-MCNC: 3.9 G/DL (ref 3.4–5)
ALP SERPL-CCNC: 82 U/L (ref 40–150)
ALT SERPL W P-5'-P-CCNC: 30 U/L (ref 0–50)
ANION GAP SERPL CALCULATED.3IONS-SCNC: 14 MMOL/L (ref 3–14)
AST SERPL W P-5'-P-CCNC: 35 U/L (ref 0–45)
BILIRUB SERPL-MCNC: 0.5 MG/DL (ref 0.2–1.3)
BUN SERPL-MCNC: 53 MG/DL (ref 7–30)
CALCIUM SERPL-MCNC: 9.6 MG/DL (ref 8.5–10.1)
CHLORIDE BLD-SCNC: 110 MMOL/L (ref 94–109)
CO2 SERPL-SCNC: 23 MMOL/L (ref 20–32)
CREAT SERPL-MCNC: 1.8 MG/DL (ref 0.52–1.04)
D DIMER PPP FEU-MCNC: 0.39 UG/ML FEU (ref 0–0.5)
EGFRCR SERPLBLD CKD-EPI 2021: 27 ML/MIN/1.73M2
ERYTHROCYTE [DISTWIDTH] IN BLOOD BY AUTOMATED COUNT: 13.9 % (ref 10–15)
GLUCOSE BLD-MCNC: 137 MG/DL (ref 70–99)
HCT VFR BLD AUTO: 40.4 % (ref 35–47)
HGB BLD-MCNC: 13 G/DL (ref 11.7–15.7)
INR PPP: 2.41 (ref 0.85–1.15)
MCH RBC QN AUTO: 28.9 PG (ref 26.5–33)
MCHC RBC AUTO-ENTMCNC: 32.2 G/DL (ref 31.5–36.5)
MCV RBC AUTO: 90 FL (ref 78–100)
PLATELET # BLD AUTO: 213 10E3/UL (ref 150–450)
POTASSIUM BLD-SCNC: 4.1 MMOL/L (ref 3.4–5.3)
PROT SERPL-MCNC: 6.9 G/DL (ref 6.8–8.8)
RBC # BLD AUTO: 4.5 10E6/UL (ref 3.8–5.2)
SODIUM SERPL-SCNC: 147 MMOL/L (ref 135–145)
WBC # BLD AUTO: 3.2 10E3/UL (ref 4–11)

## 2025-02-13 RX ORDER — WARFARIN SODIUM 5 MG/1
5 TABLET ORAL DAILY
Qty: 135 TABLET | Refills: 1 | Status: SHIPPED | OUTPATIENT
Start: 2025-02-13

## 2025-02-13 ASSESSMENT — PAIN SCALES - GENERAL: PAINLEVEL_OUTOF10: MILD PAIN (1)

## 2025-02-13 NOTE — Clinical Note
Hello -patient was seen at the [facility] today.  Workup was reassuring.  She did have mild increase in her creatinine (but still within her baseline) on her creatinine.  And a new complex avascular collection along the medial aspect of the distal thigh on the left (most likely hematoma).  I was wondering if you were able to get a repeat BMP next week to make sure creatinine is stable and repeat imaging of the avascular collection with ultrasound in 2 to 3 months.  We do not follow patients chronically in the outpatient setting.  Thank you so much!

## 2025-02-13 NOTE — PATIENT INSTRUCTIONS
Your leg ultrasound redemonstrated the blood clot that was seen on 1/25.  Radiologist reported that it looks a little bit better.    They did see a small collection of blood along the middle aspect of your leg.  This is most likely due to you bumping into something without realizing it.  You are much more likely to form of these hematomas while on blood thinners.  The area should be reimaged in the next 2 to 3 months.  I will send a message to your regular provider    Your blood work did show some dehydration.  May be because you have not had anything to eat while you have been here this morning.  Please make sure to push the fluids and recheck the kidney function next week.  I will also send a message to your regular provider regarding this    Your INR is within range, no changes to the warfarin for now    Your D-dimer is negative.  We do not need to do imaging for potential clot in your lungs.

## 2025-02-13 NOTE — TELEPHONE ENCOUNTER
General Call      Reason for Call:  INR    What are your questions or concerns:  She states they did her INR at [facility] and canceled her appt at [place]. She will stay on the regimen you gave her until she hears different    Date of last appointment with provider: 2-13-25    Could we send this information to you in [facility] or would you prefer to receive a phone call?:   No preference   Okay to leave a detailed message?: Yes at     [phone]

## 2025-02-13 NOTE — PROGRESS NOTES
ANTICOAGULATION MANAGEMENT     Robbingarrett Witt 84 year old female is on warfarin with therapeutic INR result. (Goal INR 2.0-3.0)    Recent labs: (last 7 days)     02/13/25  1134   INR 2.41*       ASSESSMENT     Source(s): Chart Review and Patient/Caregiver Call     Warfarin doses taken: Warfarin taken as instructed  Diet: No new diet changes identified  Medication/supplement changes: None noted  New illness, injury, or hospitalization: No  Signs or symptoms of bleeding or clotting: No  Previous result: Therapeutic last visit; previously outside of goal range  Additional findings:  she had leg imaging today d/t clot symptoms and it was negative. Needs more labs next week       PLAN     Recommended plan for no diet, medication or health factor changes affecting INR     Dosing Instructions: Continue your current warfarin dose with next INR in 1 week       Summary  As of 2/13/2025      Full warfarin instructions:  10 mg every Mon, Wed, Fri; 7.5 mg all other days   Next INR check:  2/20/2025               Telephone call with Berta who verbalizes understanding and agrees to plan    Lab visit scheduled    Education provided: Please call back if any changes to your diet, medications or how you've been taking warfarin    Plan made per [facility] anticoagulation protocol    Tressa Quinones, RN  2/13/2025  Anticoagulation Clinic  [employer] for routing messages: mely [facility]  [facility] patient phone line: [phone]        _______________________________________________________________________     Anticoagulation Episode Summary       Current INR goal:  2.0-3.0   TTR:  100.0% (3 d)   Target end date:  Indefinite   Send INR reminders to:  JANETH MIDWAY    Indications    Multiple subsegmental pulmonary emboli without acute cor pulmonale (H) [I26.94]             Comments:  --             Anticoagulation Care Providers       Provider Role Specialty Phone number    Dianna Lucero MD Referring Internal Medicine [phone]

## 2025-02-13 NOTE — PROGRESS NOTES
Acute and Diagnostic Services Clinic Visit    Assessment & Plan      Diagnosis Comments   1. Pain of left lower leg  CBC with platelets, Comprehensive metabolic panel, D dimer quantitative, US Lower Extremity Venous Duplex Left       2. History of pulmonary embolism  D dimer quantitative, US Lower Extremity Venous Duplex Left       3. On warfarin at home  CBC with platelets, Comprehensive metabolic panel, INR       4. CKD (chronic kidney disease) stage 4, GFR 15-29 ml/min (H)        5. Acute deep vein thrombosis (DVT) of femoral vein of left lower extremity (H)            Physical exam and vitals are stable  However, given new onset of left leg pain, we will proceed with Doppler for assessment of progression of the left leg DVT.    Symptoms do not seem to be consistent with PE.  However, given recent PE (unprovoked) with recent discontinuation of Lovenox, will get D-dimer.  If negative, okay not to proceed with CT chest.  Will also get some baseline blood work to make sure that no electrolyte imbalances or anemia is contributing to the symptoms      1:00 PM  [facility] called.  Ultrasound still consistent with partially occlusive DVT.  When compared to the previous imaging, it appears to be improved.  Patient does have a new cystic lesion along the lateral aspect of the knee and a complex avascular collection in the subcutaneous fat.  Possibly a hematoma.    INR within therapeutic range  Labs are otherwise reassuring and D-dimer is negative  CMP is consistent with baseline CKD stage IV with mild elevation in creatinine when compared to last time.  Reviewed all of this with the patient.  Continue warfarin as managed per ACC.  No need for PE rule out.  Complex avascular collection along the medial aspect of the distal thigh should be followed up in 2 to 3 months to assess stability.  Most likely hematoma in the setting of newly started AC.  Will send a message to PCP to get repeat imaging in a few months on the  outpatient side.    44 minutes were spent doing chart review, history and exam, documentation and further activities per the note.       No follow-ups on file.    Laura Hampton is a 84 year old, presenting for the following health issues:  Leg Problem (Left lower extremity, calf)        1/31/2025    11:36 AM   Additional Questions   Roomed by john   Accompanied by tammy     HPI         Patient is a very pleasant 84-year-old female with recent PMH of Left lower extremity DVT (partially occlusive) and extensive bilateral PEs with right heart strain on CT.  Not a candidate for thrombectomy.  Transitioned to warfarin with Lovenox bridge.  DOACs were cost prohibitive for the patient.  Patient has been taking her warfarin and Lovenox consistently.  She was therapeutic at her last INR check and discontinue the Lovenox on Monday.    She is coming in today because she has had new onset of left lower leg cramping and pain.  Last night, she had an itch in her left leg.  She was scratching her leg and shortly thereafter, she noticed it was very achy when she stretched her leg and extended or flexed her foot.  She did not think that there was any obvious swelling or redness to the area.  No fevers or chills.  No worsening shortness of breath or chest heaviness.    She has not noticed any bruising or hematuria.        Evaluation for possible DVT  Onset/Duration: Last night  Description:       Location: Left leg, calf       Redness: Patient not aware       Pain: 1/10       Warmth: Patient not aware       Joint swelling Patient does not think so  Progression of symptoms same  Accompanying signs and symptoms:       Fevers: no        Numbness/tingling/weakness: no        Chest pain/pleurisy: no        Shortness of breath: no   History        Trauma: no         Recent travel/when: no         Previous history of DVT: no         Family history of DVT: YES- Mother, brother, niece, nephew        Recent surgery: no   Aggravating  factors include: none  Therapies tried and outcome: roll on stick, SULEIMAN.   Prior surgery on arteries of veins in this area: No      Review of Systems  As per HPI       Objective    /75 (BP Location: Right arm, Patient Position: Sitting, Cuff Size: Adult Large)   Pulse 85   Temp 98.7  F (37.1  C) (Oral)   Resp 16   Wt 78.5 kg (173 lb 1.6 oz)   LMP  (LMP Unknown)   SpO2 96%   BMI 28.81 kg/m    Body mass index is 28.81 kg/m .  Physical Exam   GENERAL: alert and no distress  HENT: ear canals and TM's normal, nose and mouth without ulcers or lesions  NECK: no adenopathy, no asymmetry, masses, or scars  RESP: lungs clear to auscultation - no rales, rhonchi or wheezes  CV: regular rate and rhythm,no murmur, click or rub, no peripheral edema  MS: no gross musculoskeletal defects noted, no edema  No obvious gait abnormalities  No obvious swelling of the left lower extremity  Negative Homans' sign  No obvious bruising  No swelling of the feet or the ankles bilaterally  PSYCH: mentation appears normal, affect normal/bright    Signed Electronically by: Anita French,

## 2025-02-13 NOTE — TELEPHONE ENCOUNTER
"Nurse Triage SBAR    Situation: Noticed bump on L calf over vein last night.    Background:   Hospitalized with pulmonary embolism & \"Acute left common femoral and popliteal vein thrombosis\" 1/24/25  Recently bridged from lovenox to warfarin, and now only on warfarin. Last INR 2.4 on 2/10/25. Due for INR today.    Assessment:   Bump on L leg over vein size smaller than inch in diameter.  L calf sore. \"I can feel it, but it's not that sore.\" Doesn't interfere with walking.  Denies difficulty breathing, chest pain, leg swelling.    Recommendation: Call [facility]/Go to ED/UCC Now (Or To Office with PCP Approval)  Discussed [facility] with patient. They are agreeable. [place] would be the closest location to them.    Referral to Acute and Diagnostic Services    [phone] ([address], [place]  [address]    Transition to Acute & Diagnostic Services Clinic has been discussed with patient, and he agrees with next level of care.   Patient understands that evaluation/treatment at [facility] typically takes significantly longer than in clinic/urgent care (>2 hours).  The [facility]ostics [facility] has been contacted by provider/staff to confirm patient acceptance. They will schedule her for 10AM, but will see her as soon as she can get there, as I let them know she had planned to shower and then head over. They will also check her INR there, so will cancel lab-only for today.    Outgoing call to patient. Agreeable to plan above.    Reason for Disposition   Thigh or calf pain in only one leg and present > 1 hour    Additional Information   Negative: Looks like a broken bone or dislocated joint (e.g., crooked or deformed)   Negative: Sounds like a life-threatening emergency to the triager   Negative: Followed a hip injury   Negative: Followed a knee injury   Negative: Followed an ankle injury   Negative: Back pain radiating (shooting) into leg(s)   Negative: Foot pain is " main symptom   Negative: Ankle pain is main symptom   Negative: Knee pain is main symptom   Negative: Leg swelling is main symptom   Negative: Chest pain   Negative: Difficulty breathing   Negative: Entire foot is cool or blue in comparison to other side   Negative: Unable to walk   Negative: Swollen joint and fever   Negative: Fever and red area (or area very tender to touch)    Protocols used: Leg Pain-A-OH

## 2025-02-14 NOTE — TELEPHONE ENCOUNTER
Please let pt know,  I saw results from urgency room.    Her lab work showed slightly elevated sodium level and slower kidney function. She should increase fluid consumption and repeat labs again in 7-10 days ( I put order in).    INR level was therapeutic. O'K to stop Lovenox, continue coumadin.    US showed stable DVT in her left leg, not worse. Distal thigh showed fluid collection, most likely hematoma. Hematoma  Could be from being on both Lovenox and coumadin. If the lump  is getting bigger, let me know.

## 2025-02-20 ENCOUNTER — LAB (OUTPATIENT)
Dept: LAB | Facility: CLINIC | Age: 85
End: 2025-02-20
Payer: COMMERCIAL

## 2025-02-20 ENCOUNTER — ANTICOAGULATION THERAPY VISIT (OUTPATIENT)
Dept: ANTICOAGULATION | Facility: CLINIC | Age: 85
End: 2025-02-20

## 2025-02-20 DIAGNOSIS — N18.32 STAGE 3B CHRONIC KIDNEY DISEASE (H): ICD-10-CM

## 2025-02-20 DIAGNOSIS — I26.94 MULTIPLE SUBSEGMENTAL PULMONARY EMBOLI WITHOUT ACUTE COR PULMONALE (H): Primary | ICD-10-CM

## 2025-02-20 DIAGNOSIS — I26.94 MULTIPLE SUBSEGMENTAL PULMONARY EMBOLI WITHOUT ACUTE COR PULMONALE (H): ICD-10-CM

## 2025-02-20 LAB
ANION GAP SERPL CALCULATED.3IONS-SCNC: 15 MMOL/L (ref 7–15)
BUN SERPL-MCNC: 51 MG/DL (ref 8–23)
CALCIUM SERPL-MCNC: 9.5 MG/DL (ref 8.8–10.4)
CHLORIDE SERPL-SCNC: 106 MMOL/L (ref 98–107)
CREAT SERPL-MCNC: 1.65 MG/DL (ref 0.51–0.95)
EGFRCR SERPLBLD CKD-EPI 2021: 30 ML/MIN/1.73M2
GLUCOSE SERPL-MCNC: 156 MG/DL (ref 70–99)
HCO3 SERPL-SCNC: 21 MMOL/L (ref 22–29)
INR BLD: 4.9 (ref 0.9–1.1)
POTASSIUM SERPL-SCNC: 3.9 MMOL/L (ref 3.4–5.3)
SODIUM SERPL-SCNC: 142 MMOL/L (ref 135–145)

## 2025-02-20 NOTE — PROGRESS NOTES
ANTICOAGULATION MANAGEMENT     Berta Witt 84 year old female is on warfarin with supratherapeutic INR result. (Goal INR 2.0-3.0)    Recent labs: (last 7 days)     02/20/25  1030   INR 4.9*       ASSESSMENT     Source(s): Chart Review and Patient/Caregiver Call     Warfarin doses taken: Warfarin taken as instructed  Diet: Decreased greens/vitamin K in diet; plans to resume previous intake  Medication/supplement changes: None noted  New illness, injury, or hospitalization: No  Signs or symptoms of bleeding or clotting: No  Previous result: Subtherapeutic  Additional findings:  Berta eliminated all greens, we discussed that she should maintain some in her diet since she does like them. For now will do 3 times per week  and we will adjust as needed.  Reviewed that we can adjust greens and/or warfarin dose to influence inr.       PLAN     Recommended plan for temporary change(s) affecting INR     Dosing Instructions: hold dose then decrease your warfarin dose (8.3% change) with next INR in 1 week   add back greens 3 servings weekly    Summary  As of 2/20/2025      Full warfarin instructions:  2/20: Hold; Otherwise 10 mg every Mon; 7.5 mg all other days   Next INR check:  2/28/2025               Telephone call with Berta who verbalizes understanding and agrees to plan    Lab visit scheduled    Education provided: Please call back if any changes to your diet, medications or how you've been taking warfarin    Plan made per [facility] anticoagulation protocol    Tressa Quinones RN  2/20/2025  Anticoagulation Clinic  Off[facility] [employer] for routing messages: mely [address]  [facility] patient phone line: [phone]        _______________________________________________________________________     Anticoagulation Episode Summary       Current INR goal:  2.0-3.0   TTR:  49.2% (1.4 wk)   Target end date:  Indefinite   Send INR reminders to:  JANETH MIDWAY    Indications    Multiple subsegmental pulmonary emboli without acute cor pulmonale  (H) [I26.94]             Comments:  --             Anticoagulation Care Providers       Provider Role Specialty Phone number    Dianna Lucero MD Referring Internal Medicine [phone]

## 2025-02-21 ENCOUNTER — TELEPHONE (OUTPATIENT)
Dept: ANTICOAGULATION | Facility: CLINIC | Age: 85
End: 2025-02-21
Payer: COMMERCIAL

## 2025-02-21 NOTE — TELEPHONE ENCOUNTER
General Call    Contacts       Contact Date/Time Type Contact Phone/Fax    02/21/2025 04:00 PM CST Phone (Incoming) Berta Witt (Self) [phone] (M)     Wondering if it is okay to have superbeets, a nutritional suppliment          Reason for Call:  Question    What are your questions or concerns:  Wondering if it is okay to have superbeets, a nutritional suppliment and if it would affect my numbers in any way    Date of last appointment with provider: N/A    Could we send this information to you in [employer] or would you prefer to receive a phone call?:   Patient would prefer a phone call   Okay to leave a detailed message?: Yes at Cell number on file:    Telephone Information:   Mobile [phone]

## 2025-02-24 NOTE — TELEPHONE ENCOUNTER
Discussed supplements with Berta. She was given Superbeets from a friend.  I advised she hold off on them until we have inr stable therapeutic and then we could discuss.

## 2025-02-27 ENCOUNTER — LAB (OUTPATIENT)
Dept: LAB | Facility: CLINIC | Age: 85
End: 2025-02-27
Payer: COMMERCIAL

## 2025-02-27 ENCOUNTER — ANTICOAGULATION THERAPY VISIT (OUTPATIENT)
Dept: ANTICOAGULATION | Facility: CLINIC | Age: 85
End: 2025-02-27

## 2025-02-27 DIAGNOSIS — I26.94 MULTIPLE SUBSEGMENTAL PULMONARY EMBOLI WITHOUT ACUTE COR PULMONALE (H): Primary | ICD-10-CM

## 2025-02-27 DIAGNOSIS — I26.94 MULTIPLE SUBSEGMENTAL PULMONARY EMBOLI WITHOUT ACUTE COR PULMONALE (H): ICD-10-CM

## 2025-02-27 LAB — INR BLD: 4.1 (ref 0.9–1.1)

## 2025-02-27 NOTE — PROGRESS NOTES
ANTICOAGULATION MANAGEMENT     Berta Witt 84 year old female is on warfarin with supratherapeutic INR result. (Goal INR 2.0-3.0)    Recent labs: (last 7 days)     02/27/25  0930   INR 4.1*       ASSESSMENT     Warfarin Lab Questionnaire    Warfarin Doses Last 7 Days      2/27/2025     9:25 AM   Dose in Tablet or Mg   TAB or MG? tablet (tab)     Pt Rptd Dose SUNDAY MONDAY TUESDAY WED THURS FRIDAY SATURDAY 2/27/2025   9:25 AM 1.5 1.5 1.5 1.5 1.5 1.5 1.5         2/27/2025   Warfarin Lab Questionnaire   Missed doses within past 14 days? No   Changes in diet or alcohol within past 14 days? No   Medication changes since last result? No   Injuries or illness since last result? No   New shortness of breath, severe headaches or sudden changes in vision since last result? No   Abnormal bleeding since last result? No   Upcoming surgery, procedure? No   Best number to call with results? [phone]     Previous result: Supratherapeutic  Additional findings: None       PLAN     Recommended plan for no diet, medication or health factor changes affecting INR     Dosing Instructions: hold dose then decrease your warfarin dose (13.6% change) with next INR in 10 days       Summary  As of 2/27/2025      Full warfarin instructions:  2/27: Hold; Otherwise 5 mg every Wed, Sat; 7.5 mg all other days   Next INR check:  3/7/2025               Telephone call with Berta who verbalizes understanding and agrees to plan    Lab visit scheduled    Education provided: Please call back if any changes to your diet, medications or how you've been taking warfarin    Plan made per [facility] anticoagulation protocol    Tressa Quinones, TATIANA  2/27/2025  Anticoagulation Clinic  [employer] for routing messages: mely [address]  [facility] patient phone line: [phone]        _______________________________________________________________________     Anticoagulation Episode Summary       Current INR goal:  2.0-3.0   TTR:  29.5% (2.4 wk)   Target end date:   Indefinite   Send INR reminders to:  JANETH MIDWAY    Indications    Multiple subsegmental pulmonary emboli without acute cor pulmonale (H) [I26.94]             Comments:  --             Anticoagulation Care Providers       Provider Role Specialty Phone number    Dianna Lucero MD Referring Internal Medicine [phone]

## 2025-03-10 ENCOUNTER — TELEPHONE (OUTPATIENT)
Dept: INTERNAL MEDICINE | Facility: CLINIC | Age: 85
End: 2025-03-10
Payer: COMMERCIAL

## 2025-03-10 DIAGNOSIS — E11.22 CONTROLLED TYPE 2 DIABETES MELLITUS WITH STAGE 3 CHRONIC KIDNEY DISEASE, WITHOUT LONG-TERM CURRENT USE OF INSULIN (H): Primary | ICD-10-CM

## 2025-03-10 DIAGNOSIS — N18.30 CONTROLLED TYPE 2 DIABETES MELLITUS WITH STAGE 3 CHRONIC KIDNEY DISEASE, WITHOUT LONG-TERM CURRENT USE OF INSULIN (H): Primary | ICD-10-CM

## 2025-03-10 NOTE — TELEPHONE ENCOUNTER
Great,  I sent prescription for Jardiance 25 mg tablet in.  Please let patient know the plan to take half of it daily.  Thank you for helping her to get her out how to get it covered!    NH   18

## 2025-03-10 NOTE — TELEPHONE ENCOUNTER
Patient is calling quite concerned that she cannot  her Jardiance prescription because she states it is hundreds of dollars and she cannot afford it. She mentions she has 3 days left of previous script, but does not know what to do after that. She is asking if pcp has an alternative medication that is more affordable. RN asked if she has contacted her insurance about an alternative, which she has not yet. However, she is looking for pcp input.     Neftali Wahl RN

## 2025-03-10 NOTE — TELEPHONE ENCOUNTER
Velma,  What would you recommend? Farxiga? Should we change to 10 mg or taper up?  Does it have same renal protective effect as Jardiance?   Thank you,  NH   eszopiclone 3 mg oral tablet: 1 tab(s) orally once a day (at bedtime) as needed for  insomnia  famotidine 40 mg oral tablet: 1 tab(s) orally once a day  Flomax 0.4 mg oral capsule: 1 cap(s) orally once a day  Robaxin 500 mg oral tablet: 1 tab(s) orally 3 times a day

## 2025-03-10 NOTE — TELEPHONE ENCOUNTER
Carter PARKINSON,    The sal is comparable. I just spoke with Berta(gave me a call earlier). I will work to check if she can qualify for farxiga, but for now I have asked her to enroll on the new Medicare Part D payment plan ($2000 max out of packet payment spread over 12 months) to help lower the price for this month. The other thing is lets order Jardiance 25 mg daily, but will ask patient to take half and she will be able to use it for two months.    Thanks,  Velma

## 2025-03-11 ENCOUNTER — TELEPHONE (OUTPATIENT)
Dept: INTERNAL MEDICINE | Facility: CLINIC | Age: 85
End: 2025-03-11
Payer: COMMERCIAL

## 2025-03-11 ENCOUNTER — TRANSFERRED RECORDS (OUTPATIENT)
Dept: HEALTH INFORMATION MANAGEMENT | Facility: CLINIC | Age: 85
End: 2025-03-11
Payer: COMMERCIAL

## 2025-03-24 ENCOUNTER — TELEPHONE (OUTPATIENT)
Dept: INTERNAL MEDICINE | Facility: CLINIC | Age: 85
End: 2025-03-24
Payer: COMMERCIAL

## 2025-03-24 DIAGNOSIS — I26.94 MULTIPLE SUBSEGMENTAL PULMONARY EMBOLI WITHOUT ACUTE COR PULMONALE (H): Primary | ICD-10-CM

## 2025-03-24 NOTE — TELEPHONE ENCOUNTER
FYI - Status Update    Who is Calling: patient    Update: patient is calling in with questions about eating dried edamame and switching a multivitamin with out vitamin k in it  and what the effects it will be with the anticoagulation      Does caller want a call/response back: Yes     Could we send this information to you in [employer] or would you prefer to receive a phone call?:   Patient would prefer a phone call   Okay to leave a detailed message?: Yes at Home number on file [phone] (home)

## 2025-03-24 NOTE — TELEPHONE ENCOUNTER
Taled with Berta about the new vitamins.  She toi have the edamame and take the vitamins and we will see what her inr looks like at ov Friday.

## 2025-03-27 DIAGNOSIS — E87.6 HYPOKALEMIA: ICD-10-CM

## 2025-03-27 RX ORDER — POTASSIUM CHLORIDE 1500 MG/1
20 TABLET, EXTENDED RELEASE ORAL
Qty: 90 TABLET | Refills: 0 | OUTPATIENT
Start: 2025-03-27

## 2025-04-01 ENCOUNTER — TRANSFERRED RECORDS (OUTPATIENT)
Dept: HEALTH INFORMATION MANAGEMENT | Facility: CLINIC | Age: 85
End: 2025-04-01
Payer: COMMERCIAL

## 2025-04-01 LAB — RETINOPATHY: NEGATIVE

## 2025-04-07 ENCOUNTER — TELEPHONE (OUTPATIENT)
Dept: INTERNAL MEDICINE | Facility: CLINIC | Age: 85
End: 2025-04-07
Payer: COMMERCIAL

## 2025-04-07 NOTE — TELEPHONE ENCOUNTER
Please start prior authorization.    Continuous Glucose Sensor (FREESTYLE MODESTO 3 PLUS SENSOR) MISC 6 each 11 9/30/2024 -- No   Sig: Change every 15 days.   Sent to pharmacy as: FreeStyle [place] 3 Plus Sensor   Class: E-Prescribe   Order: [id number]   E-Prescribing Status: Receipt confirmed by pharmacy (9/30/2024  8:57 AM CDT)     Medication Administration Instructions    Change every 15 days.     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

## 2025-04-08 ENCOUNTER — TELEPHONE (OUTPATIENT)
Dept: INTERNAL MEDICINE | Facility: CLINIC | Age: 85
End: 2025-04-08
Payer: COMMERCIAL

## 2025-04-08 NOTE — TELEPHONE ENCOUNTER
Prior Authorization Approval    Medication: FREESTYLE MODESTO 3 PLUS SENSOR MISC  Authorization Effective Date: 3/9/2025  Authorization Expiration Date: 4/8/2027  Approved Dose/Quantity:   Reference #: [id number]   Insurance Company: [employer] Non-Specialty PA's - Phone [phone] Fax [phone]  Expected CoPay: $    CoPay Card Available:      Financial Assistance Needed:   Which Pharmacy is filling the prescription: [id number] [place], [place] - [address]

## 2025-04-08 NOTE — TELEPHONE ENCOUNTER
General Call      Reason for Call:  patient would like to  most recent lab results as she has issues with my chart and doesn't want to use mail and a copy 4/5 results     What are your questions or concerns:  call when ready     Date of last appointment with provider: 3/28/25    Could we send this information to you in [employer] [facility] or would you prefer to receive a phone call?:   Patient would prefer a phone call   Okay to leave a detailed message?: Yes at Cell number on file:    Telephone Information:   Mobile [phone]

## 2025-04-30 ENCOUNTER — TELEPHONE (OUTPATIENT)
Dept: INTERNAL MEDICINE | Facility: CLINIC | Age: 85
End: 2025-04-30
Payer: COMMERCIAL

## 2025-04-30 NOTE — TELEPHONE ENCOUNTER
Medication Question or Refill        What medication are you calling about (include dose and sig)?:   hydrochlorothiazide (HYDRODIURIL) 25 MG tablet -- -- 4/25/2024 -- No   Sig - Route: Take 1 tablet (25 mg) by mouth daily - Oral     Do you have any questions or concerns?  Patient has questions about the amount of medication she should be taking and will need a refill.    Could we send this information to you in [email or web address] or would you prefer to receive a phone call?:   Patient would prefer a phone call     Okay to leave a detailed message?: Yes at Cell number on file:    Telephone Information:   Mobile [phone]       Preferred Pharmacy:    [facility] - [address] [address]  Phone: [phone] Fax: [phone]        Controlled Substance Agreement on file:   CSA -- Patient Level:    CSA: None found at the patient level.

## 2025-05-01 NOTE — TELEPHONE ENCOUNTER
Pt states she currently has 12.5 mg tablets of hydrochlorothiazide, wondering if she was supposed to be taking 1 or 2 tablets of this. Unsure when she was decreased since she used to take 25 mg tablets.     Also states the pharmacist at [facility] told her to inquire if she should be taking both Jardiance and hydrochlorothiazide. Unclear what the reasoning was but pt states something about kidney.

## 2025-05-02 NOTE — TELEPHONE ENCOUNTER
From medication history review looks like she was prescribed both 12.5 mg tabs take 2 tabs a day and 25 mg tabs take one a day.    How much hydrochlorothiazide has she been taking up to now?  How is her b/p at home?  What dose of hydrochlorothiazide was she taking when we checked her labs at the end of March?

## 2025-05-04 DIAGNOSIS — N18.30 CONTROLLED TYPE 2 DIABETES MELLITUS WITH STAGE 3 CHRONIC KIDNEY DISEASE, WITHOUT LONG-TERM CURRENT USE OF INSULIN (H): ICD-10-CM

## 2025-05-04 DIAGNOSIS — E11.22 CONTROLLED TYPE 2 DIABETES MELLITUS WITH STAGE 3 CHRONIC KIDNEY DISEASE, WITHOUT LONG-TERM CURRENT USE OF INSULIN (H): ICD-10-CM

## 2025-05-04 DIAGNOSIS — I10 PRIMARY HYPERTENSION: ICD-10-CM

## 2025-05-05 RX ORDER — GLIPIZIDE 5 MG/1
5 TABLET, FILM COATED, EXTENDED RELEASE ORAL
Qty: 90 TABLET | Refills: 3 | Status: SHIPPED | OUTPATIENT
Start: 2025-05-05

## 2025-05-05 RX ORDER — HYDROCHLOROTHIAZIDE 25 MG/1
25 TABLET ORAL DAILY
Qty: 90 TABLET | Refills: 3 | Status: SHIPPED | OUTPATIENT
Start: 2025-05-05

## 2025-05-05 NOTE — TELEPHONE ENCOUNTER
Patient called regarding medication refill and questions. Patient stated her hydrochlorothiazide 25mg tablet has  and will need to be reordered. Patient also wanted to ask if it is ok to take Jardiance and Hydrochlorothiazide together. Writer advised it is most likely fine, but will check with the provider.     Patient also asked if the provider recommends she take Farxiga instead of Jardiance, as Jardiance is an expensive medication. If this medication is applicable to her condition, she is willing to try it.

## 2025-05-05 NOTE — TELEPHONE ENCOUNTER
O'K to take hydrochlorothiazide and Jardiance ogather, but chastity sure to stay hydrated, since both have a diuretic effect.    If price/insurance  for Farxciga is better we can switch to it from Jardiance.

## 2025-05-12 ENCOUNTER — OFFICE VISIT (OUTPATIENT)
Dept: PHARMACY | Facility: CLINIC | Age: 85
End: 2025-05-12
Payer: COMMERCIAL

## 2025-05-12 VITALS — DIASTOLIC BLOOD PRESSURE: 76 MMHG | SYSTOLIC BLOOD PRESSURE: 150 MMHG

## 2025-05-12 DIAGNOSIS — N18.30 CONTROLLED TYPE 2 DIABETES MELLITUS WITH STAGE 3 CHRONIC KIDNEY DISEASE, WITHOUT LONG-TERM CURRENT USE OF INSULIN (H): Primary | ICD-10-CM

## 2025-05-12 DIAGNOSIS — I10 ESSENTIAL HYPERTENSION WITH GOAL BLOOD PRESSURE LESS THAN 140/90: ICD-10-CM

## 2025-05-12 DIAGNOSIS — E11.22 CONTROLLED TYPE 2 DIABETES MELLITUS WITH STAGE 3 CHRONIC KIDNEY DISEASE, WITHOUT LONG-TERM CURRENT USE OF INSULIN (H): Primary | ICD-10-CM

## 2025-05-12 PROCEDURE — 99607 MTMS BY PHARM ADDL 15 MIN: CPT

## 2025-05-12 PROCEDURE — 3078F DIAST BP <80 MM HG: CPT

## 2025-05-12 PROCEDURE — 3077F SYST BP >= 140 MM HG: CPT

## 2025-05-12 PROCEDURE — 99605 MTMS BY PHARM NP 15 MIN: CPT

## 2025-05-12 RX ORDER — AMLODIPINE BESYLATE 5 MG/1
5 TABLET ORAL DAILY
Qty: 90 TABLET | Refills: 2 | Status: SHIPPED | OUTPATIENT
Start: 2025-05-12

## 2025-05-12 NOTE — Clinical Note
Carter PARKINSON,  Considering hydrochlorothiazide effectiveness is lower when eGFR is below 45, it is reasonable to change the medication to amlodipine. Advised patient to check BP at least daily. I would appreciate it If you could also suggest any changes.  Thanks, Velma

## 2025-05-12 NOTE — PATIENT INSTRUCTIONS
"Recommendations from today's MTM visit:                                                      MTM (medication therapy management) is a service provided by a clinical pharmacist designed to help you get the most of out of your medicines.   Today we reviewed what your medicines are for, how to know if they are working, that your medicines are safe and how to make your medicine regimen as easy as possible.      Start amlodipine 5 mg and discontinue hydrochlorothiazide   Continue checking your blood pressure at home  daily     Follow-up: Due on 06/09/2025 @ 8:30 AM    It was great speaking with you today.  I value your experience and would be very thankful for your time in providing feedback in our clinic survey. In the next few days, you may receive an email or text message from [facility] [employer] with a link to a survey related to your  clinical pharmacist.\"     To schedule another MTM appointment, please call the clinic directly or you may call the MTM scheduling line at [phone].    My Clinical Pharmacist's contact information:                                                      Please feel free to contact me with any questions or concerns you have.        Velma Medina, PharmD     Medication Therapy Management (MTM) Pharmacist     [phone]     [email or web address]     [facility]  "

## 2025-05-12 NOTE — LETTER
May 14, 2025  Berta Witt  [address]   [place] [address]    Dear Ms. Witt, [facility]     Thank you for talking with me on May 12, 2025 about your health and medications. As a follow-up to our conversation, I have included two documents:      Your Recommended To-Do List has steps you should take to get the best results from your medications.  Your Medication List will help you keep track of your medications and how to take them.    If you want to talk about these documents, please call MAXIMINO POWELL RPH at phone: [phone], Monday-Friday 8-4:30pm.    I look forward to working with you and your doctors to make sure your medications work well for you.    Sincerely,  MAXIMINO POWELL RPH  [facility] Pharmacist, [facility]

## 2025-05-12 NOTE — PROGRESS NOTES
Medication Therapy Management (MTM) Encounter    ASSESSMENT:                            Medication Adherence/Access: No issues identified.She has a container that she puts her medications for the next day. She got a pillbox, but has not used it yet.     Diabetes Type II: A1C within goal of < 8. Most of the CGM readings are within the target range (70 - 180) 87% within goal of > 70%. Considering controlled sugar readings reasonable to continue current medications.     Hypertension: In clinic and home BP is above goal of < 130/80. Patient home readings above goal of < 130/80. Thiazide diuretics become less effective antihypertensive agents at estimated GFR (eGFR) less than 45 mL/min. We will consider using amlodipine at 5 mg. Patient is taking simvastatin 10 mg, and there is no interaction between amlodipine and simvastatin 10 mg. We will communicate with PCP. Advised patient to continue checking her BP at least once daily. We will follow up closely.    PLAN:                            Start amlodipine 5 mg and discontinue hydrochlorothiazide   Continue checking your blood pressure at home daily     Follow-up: Due on 06/09/2025 @ 8:30 AM    SUBJECTIVE/OBJECTIVE:                          Berta Witt is a 84 year old female seen for a follow up visit.      Reason for visit: Diabetes Type II and Hypertension     Allergies/ADRs: Reviewed in chart  Past Medical History: Reviewed in chart  Tobacco: She reports that she has never smoked. She has never used smokeless tobacco.    Medication Adherence/Access: She has a container that she puts her medications for the next day. She has a pillbox, but has not used it yet.      Diabetes  Type II:Currently taking Jardiance, and glipizide XL 5 mg daily.  Patient does not like injecting insulin.   Not taking aspirin due age  Blood sugar monitoring: never  Current diabetes symptoms: none  Diet/Exercise: She said she eats healthy; she does not eat a lot of proteins and she eats a lot  fruits and veggies. She said she is really conscious of what she eats. If she has sweats at home she would eat them though. She adds honey in her coffee in the morning, but not anymore.  She avoided eating chips, and cheerios   Eye exam is up to date  Foot exam: due  Urine Albumin:   Lab Results   Component Value Date    UMALCR  05/24/2024      Comment:      Unable to calculate, urine albumin and/or urine creatinine is outside detectable limits.  Microalbuminuria is defined as an albumin:creatinine ratio of 17 to 299 for males and 25 to 299 for females. A ratio of albumin:creatinine of 300 or higher is indicative of overt proteinuria.  Due to biologic variability, positive results should be confirmed by a second, first-morning random or 24-hour timed urine specimen. If there is discrepancy, a third specimen is recommended. When 2 out of 3 results are in the microalbuminuria range, this is evidence for incipient nephropathy and warrants increased efforts at glucose control, blood pressure control, and institution of therapy with an angiotensin-converting-enzyme (ACE) inhibitor (if the patient can tolerate it).        Lab Results   Component Value Date    A1C 6.7 (H) 01/23/2025             Ozempic 28 days $47.00 Mounjaro 28 days $47.00 trulicity 28 days $47.00 victoza not covered Bydureon 28 days $47.00 rybelsus 30 days $47.00  - She had follow up with renal recently, and they increased jardiance and potassium doses.     - Patient complained of shoulder pain that started a month ago; she has been using lidocaine and the icy hot as well, which noted it helps.     Hypertension :    Lisinopril 40 mg daily    Hydrochlorothiazide 12.5 mg daily    Said her blood pressure has not been high. She said the last time she checked her BP was normal. She tries to walk 5 thousand steps a day.   Patient reports no current medication side effects  Patient self monitors blood pressure.  Home BP monitoring sometimes. Home BP within  goal, but some in clinic above goal likely due to nervousness.      Thiazide diuretics become less effective antihypertensive agents at estimated GFR (eGFR) less than 45 mL/min and should be replaced with a loop diuretic if eGFR is less than 30 mL/min    Home BP: 148/91, 150/90, 134/78, 144/92, 121/85, 142/83, 142/85, and 123/91    At clinic with patient's BP cuff: 155/94; 66  Manual BP cuff:      BP Readings from Last 3 Encounters:   05/12/25 (!) 150/76   03/28/25 133/71   02/13/25 138/75     BP Readings from Last 3 Encounters:   05/12/25 (!) 150/76   03/28/25 133/71   02/13/25 138/75     BP: 135/67, 117/69, 124/74,112/72, 114/76,       Today's Vitals: BP (!) 150/76   LMP  (LMP Unknown)   ----------------      I spent 38 minutes with this patient today. All changes were made via collaborative practice agreement with Dianna Lucero MD.     A summary of these recommendations was given to the patient.       Medication Therapy Recommendations  Essential hypertension with goal blood pressure less than 140/90   1 Rationale: More effective medication available - Ineffective medication - Effectiveness   Recommendation: Discontinue Medication - amLODIPine 5 MG tablet   Status: Accepted per CPA   Identified Date: 5/12/2025 Completed Date: 5/12/2025              Velma [facility], PharmD     Medication Therapy Management (MTM) Pharmacist     [phone]     [email or web address]

## 2025-05-12 NOTE — LETTER
_  Medication List        Prepared on: May 12, 2025     Bring your Medication List when you go to the doctor, hospital, or   emergency room. And, share it with your family or caregivers.     Note any changes to how you take your medications.  Cross out medications when you no longer use them.    Medication How I take it Why I use it Prescriber   amLODIPine (NORVASC) 5 MG tablet Take 1 tablet (5 mg) by mouth daily. Essential hypertension with goal blood pressure less than 140/90 Dianna Lucero MD   Continuous Glucose Sensor (FREESTYLE MODESTO 2 SENSOR) MISC Change sensor every 14 days. Controlled type 2 diabetes mellitus with stage 3 chronic kidney disease, without long-term current use of insulin (H) Dianna Lucero MD   Continuous Glucose Sensor (FREESTYLE MODESTO 3 PLUS SENSOR) MISC Change every 15 days. Controlled type 2 diabetes mellitus with stage 3 chronic kidney disease, without long-term current use of insulin (H) Dianna Lucero MD   desonide (DESOWEN) 0.05 % external cream APPLY TO THE AREA IN GROIN 2X DAILY FOR 2 WEEKS, TAKE A 2 WEEK BREAK, THEN REPEAT AS NEEDED FOR FLARES.  Rash, and itching  Patient Reported   diclofenac (VOLTAREN) 1 % topical gel Apply 2 g topically 4 times daily. Rotator cuff syndrome, left Dianna Lucero MD   empagliflozin (JARDIANCE) 25 MG TABS tablet Take 1 tablet (25 mg) by mouth daily. Controlled type 2 diabetes mellitus with stage 3 chronic kidney disease, without long-term current use of insulin (H) Dianna Lucero MD   glipiZIDE (GLUCOTROL XL) 5 MG 24 hr tablet Take 1 tablet (5 mg) by mouth daily Controlled type 2 diabetes mellitus with stage 3 chronic kidney disease, without long-term current use of insulin (H) Dianna Lucero MD   hydrochlorothiazide (HYDRODIURIL) 25 MG tablet Take 1 tablet (25 mg) by mouth daily. Primary Hypertension Dianna Lucero MD   lisinopril (ZESTRIL) 40 MG tablet Take 1 tablet (40 mg) by mouth daily Essential hypertension with  goal blood pressure less than 140/90 Gomez Blevins MD   Multiple Vitamins-Minerals (MULTIVITAMIN ADULTS PO) Take by mouth daily  General Health  Patient Reported   Nutritional Supplements (HIGH-PROTEIN NUTRITIONAL SHAKE) LIQD Take 11 oz by mouth daily This is [employer]life nutrition plan. It has 30 gm of proteins.   Patient Reported   potassium chloride howie ER (KLOR-CON M20) 20 MEQ CR tablet Take 1 tablet (20 mEq) by mouth 2 times daily. Hypokalemia Dianna Lucero MD   simvastatin (ZOCOR) 10 MG tablet TAKE 1 TABLET AT BEDTIME Controlled type 2 diabetes mellitus with stage 3 chronic kidney disease, without long-term current use of insulin (H) Dianna Lucero MD   warfarin ANTICOAGULANT (COUMADIN) 5 MG tablet Take 1.5 to 2  tablets (7.5 mg - 10 mg) daily as directed based on inr results Multiple subsegmental pulmonary emboli without acute cor pulmonale (H); Acute deep vein thrombosis (DVT) of distal vein of left lower extremity (H) Dianna Lucero MD         Add new medications, over-the-counter drugs, herbals, vitamins, or  minerals in the blank rows below.    Medication How I take it Why I use it Prescriber                                      Allergies:      - Penicillins - Hives        Side effects I have had:      Not on File        Other Information:              My notes and questions:

## 2025-05-12 NOTE — LETTER
"Recommended To-Do List      Prepared on: May 12, 2025       You can get the best results from your medications by completing the items on this \"To-Do List.\"      Bring your To-Do List when you go to your doctor. And, share it with your family or caregivers.    My To-Do List:  What we talked about: What I should do:   A medication that is not working    Stop taking hydrochlorothiazide, and start taking amlodipine           What we talked about: What I should do:                       "

## 2025-05-15 ENCOUNTER — PATIENT OUTREACH (OUTPATIENT)
Dept: CARE COORDINATION | Facility: CLINIC | Age: 85
End: 2025-05-15
Payer: COMMERCIAL

## 2025-05-29 ENCOUNTER — PATIENT OUTREACH (OUTPATIENT)
Dept: CARE COORDINATION | Facility: CLINIC | Age: 85
End: 2025-05-29
Payer: COMMERCIAL

## 2025-06-01 ENCOUNTER — RESULTS FOLLOW-UP (OUTPATIENT)
Dept: INTERNAL MEDICINE | Facility: CLINIC | Age: 85
End: 2025-06-01
Payer: COMMERCIAL

## 2025-06-01 DIAGNOSIS — E87.6 HYPOKALEMIA: Primary | ICD-10-CM

## 2025-06-06 PROBLEM — M53.3 SACROILIAC JOINT PAIN: Status: ACTIVE | Noted: 2025-06-06

## 2025-06-06 PROBLEM — M54.42 ACUTE LEFT-SIDED LOW BACK PAIN WITH LEFT-SIDED SCIATICA: Status: ACTIVE | Noted: 2025-06-06

## 2025-06-09 ENCOUNTER — VIRTUAL VISIT (OUTPATIENT)
Dept: PHARMACY | Facility: CLINIC | Age: 85
End: 2025-06-09
Payer: COMMERCIAL

## 2025-06-09 DIAGNOSIS — N18.30 CONTROLLED TYPE 2 DIABETES MELLITUS WITH STAGE 3 CHRONIC KIDNEY DISEASE, WITHOUT LONG-TERM CURRENT USE OF INSULIN (H): Primary | ICD-10-CM

## 2025-06-09 DIAGNOSIS — I10 ESSENTIAL HYPERTENSION WITH GOAL BLOOD PRESSURE LESS THAN 140/90: ICD-10-CM

## 2025-06-09 DIAGNOSIS — E11.22 CONTROLLED TYPE 2 DIABETES MELLITUS WITH STAGE 3 CHRONIC KIDNEY DISEASE, WITHOUT LONG-TERM CURRENT USE OF INSULIN (H): Primary | ICD-10-CM

## 2025-06-09 PROCEDURE — 99606 MTMS BY PHARM EST 15 MIN: CPT | Mod: 93

## 2025-06-09 PROCEDURE — 99607 MTMS BY PHARM ADDL 15 MIN: CPT | Mod: 93

## 2025-06-09 RX ORDER — LATANOPROST 50 UG/ML
SOLUTION/ DROPS OPHTHALMIC
COMMUNITY
Start: 2025-05-04

## 2025-06-09 NOTE — PATIENT INSTRUCTIONS
"Recommendations from today's MTM visit:                                                      MTM (medication therapy management) is a service provided by a clinical pharmacist designed to help you get the most of out of your medicines.   Today we reviewed what your medicines are for, how to know if they are working, that your medicines are safe and how to make your medicine regimen as easy as possible.      Continue taking current medications as prescribed   Advised patient to eat consistent amount of greens with the warfarin; It is not about not eating green veggies, but is it is about being consistent      Follow-up: Due on 07/09/2025 @ 08:30 AM     It was great speaking with you today.  I value your experience and would be very thankful for your time in providing feedback in our clinic survey. In the next few days, you may receive an email or text message from [employer] with a link to a survey related to your  clinical pharmacist.\"     To schedule another MTM appointment, please call the clinic directly or you may call the MTM scheduling line at [phone].    My Clinical Pharmacist's contact information:                                                      Please feel free to contact me with any questions or concerns you have.        Velma [facility], PharmD     Medication Therapy Management (MTM) Pharmacist     [phone]     [email or web address]     [facility]    "

## 2025-06-09 NOTE — PROGRESS NOTES
Medication Therapy Management (MTM) Encounter    ASSESSMENT:                            Medication Adherence/Access: No issues identified.She has a container that she puts her medications for the next day. She got a pillbox, but has not used it yet.     Diabetes Type II: A1C within goal of < 8. Most of the CGM readings are within the target range (70 - 180) 87% within goal of > 70%. Considering controlled sugar readings reasonable to continue current medications.     Hypertension: In clinic and home BP is above goal of < 130/80. Patient home readings above goal of < 130/80. Thiazide diuretics become less effective antihypertensive agents at estimated GFR (eGFR) less than 45 mL/min. We will consider using amlodipine at 5 mg. Patient is taking simvastatin 10 mg, and there is no interaction between amlodipine and simvastatin 10 mg. We will communicate with PCP. Advised patient to continue checking her BP at least once daily. We will follow up closely.    PLAN:                            Continue taking current medications as prescribed   Advised patient to eat consistent amount of greens with the warfarin; It is not about not eating green veggies, but is it is about being consistent      Follow-up: Due on 07/09/2025 @ 08:30 AM     SUBJECTIVE/OBJECTIVE:                          Berta Witt is a 84 year old female seen for a follow up visit.      Reason for visit: Diabetes Type II and Hypertension     Allergies/ADRs: Reviewed in chart  Past Medical History: Reviewed in chart  Tobacco: She reports that she has never smoked. She has never used smokeless tobacco.    Medication Adherence/Access: She has a container that she puts her medications for the next day. She has a pillbox, but has not used it yet.      Diabetes  Type II:Currently taking Jardiance, and glipizide XL 5 mg daily.  Patient does not like injecting insulin.   Not taking aspirin due age  Blood sugar monitoring: never  Current diabetes symptoms:  none  Diet/Exercise: She said she eats healthy; she does not eat a lot of proteins and she eats a lot fruits and veggies. She said she is really conscious of what she eats. If she has sweats at home she would eat them though. She adds honey in her coffee in the morning, but not anymore.  She avoided eating chips, and cheerios   Eye exam is up to date  Foot exam: due  Urine Albumin:   Lab Results   Component Value Date    UMALCR  05/24/2024      Comment:      Unable to calculate, urine albumin and/or urine creatinine is outside detectable limits.  Microalbuminuria is defined as an albumin:creatinine ratio of 17 to 299 for males and 25 to 299 for females. A ratio of albumin:creatinine of 300 or higher is indicative of overt proteinuria.  Due to biologic variability, positive results should be confirmed by a second, first-morning random or 24-hour timed urine specimen. If there is discrepancy, a third specimen is recommended. When 2 out of 3 results are in the microalbuminuria range, this is evidence for incipient nephropathy and warrants increased efforts at glucose control, blood pressure control, and institution of therapy with an angiotensin-converting-enzyme (ACE) inhibitor (if the patient can tolerate it).        Lab Results   Component Value Date    A1C 6.9 (H) 05/30/2025           Ozempic 28 days $47.00 Mounjaro 28 days $47.00 trulicity 28 days $47.00 victoza not covered Bydureon 28 days $47.00 rybelsus 30 days $47.00  - She had follow up with renal recently, and they increased jardiance and potassium doses.     - Patient complained of shoulder pain that started a month ago; she has been using lidocaine and the icy hot as well, which noted it helps.     - Patient wanted to try glycoshield - supplement that is claimed/thought to cure diabetes type II.     Hypertension :    Lisinopril 40 mg daily    Amlodipine 5 mg daily    Said her blood pressure has not been high. She said the last time she checked her BP was  normal. She tries to walk 5 thousand steps a day.   Patient reports no current medication side effects  Patient self monitors blood pressure.  Home BP monitoring sometimes. Home BP within goal, but some in clinic above goal likely due to nervousness.      Thiazide diuretics become less effective antihypertensive agents at estimated GFR (eGFR) less than 45 mL/min and should be replaced with a loop diuretic if eGFR is less than 30 mL/min    BP Readings from Last 3 Encounters:   05/30/25 131/68   05/12/25 (!) 150/76   03/28/25 133/71     Home BP: 134/78, 140/80, 124/89, 120/79, 123/78, 119/75, 125/87, 147/87, 134/79, 143/86, and 123/77       BP Readings from Last 3 Encounters:   05/30/25 131/68   05/12/25 (!) 150/76   03/28/25 133/71     BP Readings from Last 3 Encounters:   05/30/25 131/68   05/12/25 (!) 150/76   03/28/25 133/71     BP: 135/67, 117/69, 124/74,112/72, 114/76,       Today's Vitals: LMP  (LMP Unknown)   ----------------  Post Discharge Medication Reconciliation Status: discharge medications reconciled, continue medications without change.    I spent 33 minutes with this patient today. All changes were made via collaborative practice agreement with Dianna Lucero MD.     A summary of these recommendations was sent via [employer].      Telemedicine Visit Details  The patient's medications can be safely assessed via a telemedicine encounter.  Type of service:  Telephone visit  Originating Location (pt. Location): Home    Distant Location (provider location):  On-site  Start Time: 08:30 AM  End Time:   09:03 AM     Medication Therapy Recommendations  No medication therapy recommendations to display     Velma [facility], PharmD     Medication Therapy Management (MTM) Pharmacist     [phone]     [email or web address]

## 2025-06-09 NOTE — Clinical Note
Berta Bonner Dr. wanted to switch to DOACs from Warfarin. Noted she checked with insurance and thought she can afford it. There was a thought to order eliquis. I wanted to check with you if you are okay doing the switch? The appropriate dose for treatment of DVT/PE is 10 mg twice daily for 7 days, and then 5 mg daily thereafter.   Thanks, Velma

## 2025-06-10 ENCOUNTER — TELEPHONE (OUTPATIENT)
Dept: INTERNAL MEDICINE | Facility: CLINIC | Age: 85
End: 2025-06-10
Payer: COMMERCIAL

## 2025-06-10 NOTE — TELEPHONE ENCOUNTER
Medication Question or Refill    Contacts       Contact Date/Time Type Contact Phone/Fax    06/10/2025 05:05 PM CDT Phone (Incoming) Berta Witt (Self) [phone] (M)            What medication are you calling about (include dose and sig)?: Patient is currently takes Warfarin but she has talked with Cy[facility]oma and she would like to change to Eliquis instead.  She would like for it you to call in for the Eliquis starting now please     Preferred Pharmacy:       [facility] - [address] [address]  Phone: [phone] Fax: [phone]            Controlled Substance Agreement on file:   CSA -- Patient Level:    CSA: None found at the patient level.       Who prescribed the medication?: Jazmine    Do you need a refill? No    When did you use the medication last? Last night, she takes it at 7 pm everyday.      Patient offered an appointment? No, but if you need to talk to her about this she would be happy to talk with Dr Lucero    Do you have any questions or concerns?  Yes,  When Berta gets the new Eliquis,... does she just stop taking the Warfarin and immediately go on to the Eliquis?  Or does she taper off first?  Please let know      Could we send this information to you in [facility] or would you prefer to receive a phone call?:   Patient would prefer a phone call   Okay to leave a detailed message?: Yes at Cell number on file:    Telephone Information:   Mobile [phone]

## 2025-06-11 ENCOUNTER — TELEPHONE (OUTPATIENT)
Dept: INTERNAL MEDICINE | Facility: CLINIC | Age: 85
End: 2025-06-11
Payer: COMMERCIAL

## 2025-06-11 NOTE — TELEPHONE ENCOUNTER
FYI - Status Update    Who is Calling: patient    Update: patient would like to discuss alternatives to warfarin.     Does caller want a call/response back: Yes     Could we send this information to you in [employer] or would you prefer to receive a phone call?:   Patient would prefer a phone call   Okay to leave a detailed message?: Yes at Cell number on file:    Telephone Information:   Mobile [phone]

## 2025-06-13 ENCOUNTER — TELEPHONE (OUTPATIENT)
Dept: INTERNAL MEDICINE | Facility: CLINIC | Age: 85
End: 2025-06-13

## 2025-06-13 DIAGNOSIS — E11.22 CONTROLLED TYPE 2 DIABETES MELLITUS WITH STAGE 3 CHRONIC KIDNEY DISEASE, WITHOUT LONG-TERM CURRENT USE OF INSULIN (H): Primary | ICD-10-CM

## 2025-06-13 DIAGNOSIS — N18.30 CONTROLLED TYPE 2 DIABETES MELLITUS WITH STAGE 3 CHRONIC KIDNEY DISEASE, WITHOUT LONG-TERM CURRENT USE OF INSULIN (H): Primary | ICD-10-CM

## 2025-06-13 DIAGNOSIS — I26.94 MULTIPLE SUBSEGMENTAL PULMONARY EMBOLI WITHOUT ACUTE COR PULMONALE (H): Primary | ICD-10-CM

## 2025-06-13 NOTE — TELEPHONE ENCOUNTER
New Medication Request        What medication are you requesting?: elequis    Reason for medication request: warfarin has food restrictions so she wants to switch to elequis  [facility]    Have you taken this medication before?: No    Controlled Substance Agreement on file:   CSA -- Patient Level:    CSA: None found at the patient level.         Patient offered an appointment? No    Preferred Pharmacy:   [id number] [place], [place] - [address]  [address] [address]  Phone: [phone] Fax: [phone]    [facility] - [place], [place] - [address]  [address] [address]  Phone: [phone] Fax: [phone]    [facility] - [place], [place] - [address]   [address] [facility] [address]  Phone: [phone] Fax: [phone]    [id number] - [place], [place] - [address]   [address] [facility] [address]  Phone: [phone] Fax: [phone]      Could we send this information to you in [facility] or would you prefer to receive a phone call?:   Patient would prefer a phone call   Okay to leave a detailed message?: no

## 2025-06-16 NOTE — TELEPHONE ENCOUNTER
FYI: Pt states Eliquis was too expensive at local and mail order pharmacy. Pt states she is staying with warfarin for now, however it is very difficult due to diet restrictions. Pt plans to contact the Prescription Assistance Program also.      See 6/10/25 encounter.

## 2025-06-19 ENCOUNTER — NURSE TRIAGE (OUTPATIENT)
Dept: INTERNAL MEDICINE | Facility: CLINIC | Age: 85
End: 2025-06-19
Payer: COMMERCIAL

## 2025-06-19 DIAGNOSIS — R60.9 EDEMA, UNSPECIFIED TYPE: Primary | ICD-10-CM

## 2025-06-19 RX ORDER — FUROSEMIDE 20 MG/1
20 TABLET ORAL DAILY
Qty: 30 TABLET | Refills: 0 | Status: SHIPPED | OUTPATIENT
Start: 2025-06-19

## 2025-06-19 NOTE — TELEPHONE ENCOUNTER
"Nurse Triage SBAR    Is this a 2nd Level Triage? YES, LICENSED PRACTITIONER REVIEW IS REQUIRED    Situation: bilateral lower extremity edema    Background: noted in visit 5/30 to have \"mild lower extremity edema\" pt states it has worsened since that visit.     Assessment:swelling is bilateral feet to ankles, per pt left side is worse. Denied any pain, redness, drainage. pt thinks swelling is related to medications, not salt as she has been trying to follow low salt diet and eating at home now. Tried compression socks for a few days but has a hard time putting them on and she did not notice any difference. Sleeping with feet elevated.     Protocol Recommended Disposition:   Go To Office Now    Recommendation: pt does not have access to car today. Willing to do labs or clinic visit tomorrow if PCP recommends. Does not want to be seen in .      Routed to provider    Reason for Disposition   Thigh, calf, or ankle swelling in both legs, but one side is definitely more swollen (Exception: Longstanding difference between legs.)    Additional Information   Negative: Sounds like a life-threatening emergency to the triager   Negative: Chest pain   Negative: Followed an insect bite and has localized swelling (e.g., small area of puffy or swollen skin)   Negative: Followed a knee injury   Negative: Ankle injury   Negative: Pregnant with leg swelling or edema   Negative: Difficulty breathing at rest   Negative: Entire foot is cool or blue in comparison to other side   Negative: Cast on leg or ankle and has increasing pain   Negative: Can't walk or can barely stand (new-onset)   Negative: Fever and red area (or area very tender to touch)   Negative: Patient sounds very sick or weak to the triager   Negative: Pregnant 20 or more weeks and sudden weight gain (i.e., > 2 lbs, 1 kg in one week)   Negative: Thigh or calf pain and only 1 side and present > 1 hour   Negative: Thigh, calf, or ankle swelling in only one leg   Negative: " SEVERE swelling (e.g., swelling extends above knee, entire leg is swollen, weeping fluid)    Protocols used: Leg Swelling and Edema-A-OH

## 2025-06-19 NOTE — TELEPHONE ENCOUNTER
Spoke with patient and relayed provider advisement. Patient will  her furosemide. Patient states she does have 20MeQ K tabs at home so she will cut those in half. Scheduled patient for a follow up appointment on 7/3/25     She will call back if symptoms worsen or fail to improve.

## 2025-06-19 NOTE — TELEPHONE ENCOUNTER
Is most likely related to her medication change since her hydrochlorothiazide was stopped and amlodipine was added by pharmacist on May 12.    We can try furosemide 20 mg daily for 7 days and then follow-up in the office for repeat blood work and evaluation.  She can take potassium 10 mEq with furosemide (does she still have 20 mEq tablets at home?  She can cut this tablet in half and is a crush would have dissolved in water if it is hard to swallow)    I sent prescription for Lasix to her pharmacy.    Okay to schedule with me for the same-day slots in the week, if symptoms are getting worse or she is having shortness of breath that she should be seen sooner by 1 of my associate.

## 2025-06-23 ENCOUNTER — ANTICOAGULATION THERAPY VISIT (OUTPATIENT)
Dept: ANTICOAGULATION | Facility: CLINIC | Age: 85
End: 2025-06-23

## 2025-06-23 ENCOUNTER — LAB (OUTPATIENT)
Dept: LAB | Facility: CLINIC | Age: 85
End: 2025-06-23
Payer: COMMERCIAL

## 2025-06-23 ENCOUNTER — THERAPY VISIT (OUTPATIENT)
Dept: PHYSICAL THERAPY | Facility: REHABILITATION | Age: 85
End: 2025-06-23
Payer: COMMERCIAL

## 2025-06-23 DIAGNOSIS — M54.42 ACUTE LEFT-SIDED LOW BACK PAIN WITH LEFT-SIDED SCIATICA: ICD-10-CM

## 2025-06-23 DIAGNOSIS — M53.3 SACROILIAC JOINT PAIN: Primary | ICD-10-CM

## 2025-06-23 DIAGNOSIS — I26.94 MULTIPLE SUBSEGMENTAL PULMONARY EMBOLI WITHOUT ACUTE COR PULMONALE (H): Primary | ICD-10-CM

## 2025-06-23 DIAGNOSIS — I26.94 MULTIPLE SUBSEGMENTAL PULMONARY EMBOLI WITHOUT ACUTE COR PULMONALE (H): ICD-10-CM

## 2025-06-23 LAB — INR BLD: 2.7 (ref 0.9–1.1)

## 2025-06-23 PROCEDURE — 36416 COLLJ CAPILLARY BLOOD SPEC: CPT

## 2025-06-23 PROCEDURE — 85610 PROTHROMBIN TIME: CPT

## 2025-06-23 PROCEDURE — 97110 THERAPEUTIC EXERCISES: CPT | Mod: GP | Performed by: PHYSICAL THERAPIST

## 2025-06-23 NOTE — PROGRESS NOTES
ANTICOAGULATION MANAGEMENT     Berta Witt 84 year old female is on warfarin with therapeutic INR result. (Goal INR 2.0-3.0)    Recent labs: (last 7 days)     06/23/25  0958   INR 2.7*       ASSESSMENT     Source(s): Chart Review  Previous INR was Therapeutic last visit; previously outside of goal range  Medication, diet, health changes since last INR: chart reviewed; none identified         PLAN     Recommended plan for no diet, medication or health factor changes affecting INR     Dosing Instructions: Continue your current warfarin dose with next INR in 4 weeks       Summary  As of 6/23/2025      Full warfarin instructions:  5 mg every Mon, Wed, Fri; 7.5 mg all other days   Next INR check:  7/21/2025               Detailed voice message left for Berta with dosing instructions and follow up date.     Contact [phone] to schedule and with any changes, questions or concerns.     Education provided: Please call back if any changes to your diet, medications or how you've been taking warfarin    Plan made per [facility] anticoagulation protocol    Tressa Quinones RN  6/23/2025  Anticoagulation Clinic  [employer] for routing messages: mely [address]  [facility] patient phone line: [phone]        _______________________________________________________________________     Anticoagulation Episode Summary       Current INR goal:  2.0-3.0   TTR:  55.7% (4.4 mo)   Target end date:  Indefinite   [facility]    Indications    Multiple subsegmental pulmonary emboli without acute cor pulmonale (H) [I26.94]             Comments:  --             Anticoagulation Care Providers       Provider Role Specialty Phone number    Dianna Lucero MD Referring Internal Medicine [phone]

## 2025-07-03 ENCOUNTER — TELEPHONE (OUTPATIENT)
Dept: INTERNAL MEDICINE | Facility: CLINIC | Age: 85
End: 2025-07-03

## 2025-07-03 ENCOUNTER — OFFICE VISIT (OUTPATIENT)
Dept: INTERNAL MEDICINE | Facility: CLINIC | Age: 85
End: 2025-07-03
Payer: COMMERCIAL

## 2025-07-03 VITALS
SYSTOLIC BLOOD PRESSURE: 134 MMHG | DIASTOLIC BLOOD PRESSURE: 79 MMHG | HEART RATE: 70 BPM | TEMPERATURE: 97.3 F | OXYGEN SATURATION: 98 %

## 2025-07-03 DIAGNOSIS — R60.9 EDEMA, UNSPECIFIED TYPE: ICD-10-CM

## 2025-07-03 DIAGNOSIS — I26.94 MULTIPLE SUBSEGMENTAL PULMONARY EMBOLI WITHOUT ACUTE COR PULMONALE (H): ICD-10-CM

## 2025-07-03 DIAGNOSIS — I10 PRIMARY HYPERTENSION: Primary | ICD-10-CM

## 2025-07-03 DIAGNOSIS — N18.30 CONTROLLED TYPE 2 DIABETES MELLITUS WITH STAGE 3 CHRONIC KIDNEY DISEASE, WITHOUT LONG-TERM CURRENT USE OF INSULIN (H): ICD-10-CM

## 2025-07-03 DIAGNOSIS — E11.22 CONTROLLED TYPE 2 DIABETES MELLITUS WITH STAGE 3 CHRONIC KIDNEY DISEASE, WITHOUT LONG-TERM CURRENT USE OF INSULIN (H): ICD-10-CM

## 2025-07-03 LAB
ANION GAP SERPL CALCULATED.3IONS-SCNC: 13 MMOL/L (ref 7–15)
BUN SERPL-MCNC: 47 MG/DL (ref 8–23)
CALCIUM SERPL-MCNC: 10 MG/DL (ref 8.8–10.4)
CHLORIDE SERPL-SCNC: 104 MMOL/L (ref 98–107)
CREAT SERPL-MCNC: 1.62 MG/DL (ref 0.51–0.95)
CREAT UR-MCNC: 13.3 MG/DL
EGFRCR SERPLBLD CKD-EPI 2021: 31 ML/MIN/1.73M2
GLUCOSE SERPL-MCNC: 138 MG/DL (ref 70–99)
HCO3 SERPL-SCNC: 26 MMOL/L (ref 22–29)
INR PPP: 2.61 (ref 0.85–1.15)
MICROALBUMIN UR-MCNC: 13.4 MG/L
MICROALBUMIN/CREAT UR: 100.75 MG/G CR (ref 0–25)
POTASSIUM SERPL-SCNC: 3.3 MMOL/L (ref 3.4–5.3)
PROTHROMBIN TIME: 27.3 SECONDS (ref 11.8–14.8)
SODIUM SERPL-SCNC: 143 MMOL/L (ref 135–145)

## 2025-07-03 RX ORDER — POTASSIUM CHLORIDE 1500 MG/1
TABLET, EXTENDED RELEASE ORAL
Qty: 90 TABLET | Refills: 2 | Status: SHIPPED | OUTPATIENT
Start: 2025-07-03

## 2025-07-03 ASSESSMENT — PAIN SCALES - GENERAL: PAINLEVEL_OUTOF10: MILD PAIN (2)

## 2025-07-03 NOTE — TELEPHONE ENCOUNTER
General Call    Contacts       Contact Date/Time Type Contact Phone/Fax    07/03/2025 09:09 AM CDT Phone (Incoming) Berta Witt (Self) [phone] (M)          Reason for Call:  anticoag    What are your questions or concerns: pt wants to know if she can get her INR today since she doesn't think she can make her appt next week.    Date of last appointment with provider: n/a    Could we send this information to you in [employer] or would you prefer to receive a phone call?:   Patient would prefer a phone call   Okay to leave a detailed message?: Yes at Cell number on file:    Telephone Information:   Mobile [phone]

## 2025-07-03 NOTE — PROGRESS NOTES
Assessment & Plan     Primary hypertension  Blood pressure is well-controlled on lisinopril 40 mg and amlodipine 5 mg.  She is on furosemide and potassium due to lower extremity edema, discussed to use compression stockings as well since she does have varicose veins.  - Basic metabolic panel  (Ca, Cl, CO2, Creat, Gluc, K, Na, BUN)    Edema, unspecified type  This started after amlodipine was added to her regimen in May, currently doing well on furosemide and potassium, I will check her BMP, if her kidney function and creatinine too high, we could stop daily furosemide and take it as needed but if she is tolerating it well, okay to continue on a regular basis.  She does have a slight right-sided murmur but not very loud, echocardiogram in 2023 showed normal ejection fraction, she has 1+ aortic regurgitation and mild sclerosis but no stenosis.  She will need refill on her furosemide, will call in once lab results are back.  - Basic metabolic panel  (Ca, Cl, CO2, Creat, Gluc, K, Na, BUN)    Multiple subsegmental pulmonary emboli without acute cor pulmonale (H)  Spontaneous multiple subsegmental PEs diagnosed on January 24, 2025, she has very strong family history of clots but this is the first episode of personal clots.  Would recommend continuing on anticoagulation indefinitely.  If she wants to challenge it, we can refer her to hematology after she has been anticoagulated for a year.  - INR    Controlled type 2 diabetes mellitus with stage 3 chronic kidney disease, without long-term current use of insulin (H)  She loves her marta monitor, denies any hypoglycemia, A1c in May was 6.9, she is on glipizide and Jardiance.  - Albumin Random Urine Quantitative with Creat Ratio; Future  - Basic metabolic panel  (Ca, Cl, CO2, Creat, Gluc, K, Na, BUN)  - Albumin Random Urine Quantitative with Creat Ratio      The longitudinal plan of care for the diagnosis(es)/condition(s) as documented were addressed during this visit. Due  "to the added complexity in care, I will continue to support Berta in the subsequent management and with ongoing continuity of care.     BMI  Estimated body mass index is 29.04 kg/m  as calculated from the following:    Height as of 5/30/25: 1.676 m (5' 6\").    Weight as of 5/30/25: 81.6 kg (179 lb 14.4 oz).       Subjective   Berta is a 84 year old, presenting for the following health issues:  office visit (Pt is here to follow up on edema and to check INR. Pt reports veins sticking out on L leg. Examine L eye and back.)        7/3/2025    11:47 AM   Additional Questions   Roomed by Ravi   Accompanied by alone         7/3/2025    11:47 AM   Patient Reported Additional Medications   Patient reports taking the following new medications none     History of Present Illness       Reason for visit:  Follow up on edema and INR check       Berta is an 84-year-old patient with history of chronic renal insufficiency, type 2 diabetes, hypertension, hyperlipidemia, spinal stenosis, regular blood donations and intermittent anemia, mild aortic stenosis, he is a retired teacher, multiple continuous PEs.  She is currently here for follow-up .     She called in our office several days ago due to worsening edema.  In May of this year her blood pressure regimen was changed: Hydrochlorothiazide was stopped and amlodipine 5 mg added to her lisinopril 40 mg.  She did develop mild to moderate lower extremity edema.  At that time I added furosemide 20 mg and potassium 10 mEq daily.  Currently she has been taking it consistently since June 19 and edema is nonexistent.    Diabetes has been good, she is using marta 3 monitoring has not had any hypoglycemia.    She is currently on Coumadin due to multiple spontaneous PEs, Eliquis was ordered but was still too expensive for her.    Review of systems: As above.      Objective    /79 (BP Location: Left arm, Patient Position: Sitting, Cuff Size: Adult Large)   Pulse 70   Temp 97.3  F " (36.3  C) (Temporal)   LMP  (LMP Unknown)   SpO2 98%   There is no height or weight on file to calculate BMI.  Physical Exam   General: well appearing female, alert and oriented x3  EYES: Eyelids, conjunctiva, and sclera were normal. Pupils were normal.   HEAD, EARS, NOSE, MOUTH, AND THROAT: no cervical LAD, no thyromegaly or nodules appreciated. TMs are visualized and normal, oropharynx is clear.  RESPIRATORY: respirations non labored, CTA bl, no wheezes, rales, no forced expiratory wheezing.  CARDIOVASCULAR: Heart rate and rhythm were normal. No murmurs, rubs,gallops. There was no peripheral edema. No carotid bruits.  GASTROINTESTINAL: Positive bowel sounds, abdomen is soft, non tender, non distended.     MUSCULOSKELETAL: Muscle mass was normal for age. No joint synovitis or deformity.  LYMPHATIC: There were no enlarged nodes palpable.  SKIN/HAIR/NAILS: Skin color was normal.  No rashes.  NEUROLOGIC: The patient was alert and oriented.  Speech was normal.  There is no facial asymmetry.   PSYCHIATRIC:  Mood and affect were normal.            Signed Electronically by: Dianna Lucero MD

## 2025-07-06 ENCOUNTER — RESULTS FOLLOW-UP (OUTPATIENT)
Dept: INTERNAL MEDICINE | Facility: CLINIC | Age: 85
End: 2025-07-06
Payer: COMMERCIAL

## 2025-07-06 DIAGNOSIS — I10 PRIMARY HYPERTENSION: Primary | ICD-10-CM

## 2025-07-07 ENCOUNTER — ANTICOAGULATION THERAPY VISIT (OUTPATIENT)
Dept: ANTICOAGULATION | Facility: CLINIC | Age: 85
End: 2025-07-07

## 2025-07-07 DIAGNOSIS — I26.94 MULTIPLE SUBSEGMENTAL PULMONARY EMBOLI WITHOUT ACUTE COR PULMONALE (H): Primary | ICD-10-CM

## 2025-07-07 NOTE — TELEPHONE ENCOUNTER
Please make sure pt saw my message ( or call her to notify):    Birdie,  Potassium level is slightly low. Kidney function is tiny bot slower then before.  Please increase potassium tablet to full tab ( instead of half).  O'K to continue on Furosemide and repeat lab work/kidney function again in 3 weeks.  Dr PARKINSON

## 2025-07-12 DIAGNOSIS — R60.9 EDEMA, UNSPECIFIED TYPE: ICD-10-CM

## 2025-07-14 DIAGNOSIS — I10 PRIMARY HYPERTENSION: Primary | ICD-10-CM

## 2025-07-14 RX ORDER — POTASSIUM CHLORIDE 1500 MG/1
TABLET, EXTENDED RELEASE ORAL
Qty: 90 TABLET | Refills: 1 | Status: SHIPPED | OUTPATIENT
Start: 2025-07-14

## 2025-07-14 RX ORDER — FUROSEMIDE 20 MG/1
20 TABLET ORAL DAILY
Qty: 30 TABLET | Refills: 0 | Status: SHIPPED | OUTPATIENT
Start: 2025-07-14

## 2025-07-14 NOTE — TELEPHONE ENCOUNTER
Medication Question or Refill      What medication are you calling about (include dose and sig)?: Potassium Chloride 20 MEQ    Preferred Pharmacy:   [id number] [address]  [address] [address]  Phone: [phone] Fax: [phone]      Controlled Substance Agreement on file:   CSA -- Patient Level:    CSA: None found at the patient level.       Who prescribed the medication?: PCP    Do you need a refill? Yes    When did you use the medication last? Daily      Do you have any questions or concerns?  Yes: Clarifying dose. Per chart review, patient should be on 20 MEQ daily. Script sent in was for 10 MEQ per day.

## 2025-07-14 NOTE — TELEPHONE ENCOUNTER
Potassium refill signed, please make sure she is on my result note for the lab work done on 7/3: She should be taking full tablet of potassium a day and have repeat blood work done in 2 weeks.

## 2025-07-15 NOTE — TELEPHONE ENCOUNTER
Pt notified results/recommendations follow up and verbalized understanding. She was able see the lab results from 7/3/25.

## 2025-07-19 ENCOUNTER — HEALTH MAINTENANCE LETTER (OUTPATIENT)
Age: 85
End: 2025-07-19

## 2025-07-23 ENCOUNTER — TELEPHONE (OUTPATIENT)
Dept: PHARMACY | Facility: CLINIC | Age: 85
End: 2025-07-23
Payer: COMMERCIAL

## 2025-07-23 NOTE — TELEPHONE ENCOUNTER
MTM referral from: [facility] visit (referral by provider)    MTM referral outreach attempt #1 on July 23, 2025 at 9:33 AM      Outcome: Patient scheduled for MTM appointment on 08/21/25 @ 08:30 AM      Velma Medina, PharmD     Medication Therapy Management (MTM) Pharmacist     [phone]     [email or web address]

## 2025-07-24 ENCOUNTER — TELEPHONE (OUTPATIENT)
Dept: INTERNAL MEDICINE | Facility: CLINIC | Age: 85
End: 2025-07-24
Payer: COMMERCIAL

## 2025-07-24 NOTE — TELEPHONE ENCOUNTER
FYI - Status Update    Who is Calling: patient    Update: Wanted to advised pcp that she has been taking tylenol for 8-9 days     Does caller want a call/response back: Yes     Could we send this information to you in [email or web address] or would you prefer to receive a phone call?:   Patient would prefer a phone call   Okay to leave a detailed message?: Yes at Cell number on file:    Telephone Information:   Mobile [phone]

## 2025-07-28 ENCOUNTER — LAB (OUTPATIENT)
Dept: LAB | Facility: CLINIC | Age: 85
End: 2025-07-28
Payer: COMMERCIAL

## 2025-07-28 ENCOUNTER — ANTICOAGULATION THERAPY VISIT (OUTPATIENT)
Dept: ANTICOAGULATION | Facility: CLINIC | Age: 85
End: 2025-07-28

## 2025-07-28 ENCOUNTER — TRANSFERRED RECORDS (OUTPATIENT)
Dept: HEALTH INFORMATION MANAGEMENT | Facility: CLINIC | Age: 85
End: 2025-07-28

## 2025-07-28 DIAGNOSIS — I26.94 MULTIPLE SUBSEGMENTAL PULMONARY EMBOLI WITHOUT ACUTE COR PULMONALE (H): Primary | ICD-10-CM

## 2025-07-28 DIAGNOSIS — I10 PRIMARY HYPERTENSION: ICD-10-CM

## 2025-07-28 DIAGNOSIS — I26.94 MULTIPLE SUBSEGMENTAL PULMONARY EMBOLI WITHOUT ACUTE COR PULMONALE (H): ICD-10-CM

## 2025-07-28 LAB
ANION GAP SERPL CALCULATED.3IONS-SCNC: 14 MMOL/L (ref 7–15)
BUN SERPL-MCNC: 57.6 MG/DL (ref 8–23)
CALCIUM SERPL-MCNC: 9.5 MG/DL (ref 8.8–10.4)
CHLORIDE SERPL-SCNC: 106 MMOL/L (ref 98–107)
CREAT SERPL-MCNC: 1.78 MG/DL (ref 0.51–0.95)
EGFRCR SERPLBLD CKD-EPI 2021: 28 ML/MIN/1.73M2
GLUCOSE SERPL-MCNC: 207 MG/DL (ref 70–99)
HCO3 SERPL-SCNC: 22 MMOL/L (ref 22–29)
INR BLD: 3.3 (ref 0.9–1.1)
POTASSIUM SERPL-SCNC: 3.7 MMOL/L (ref 3.4–5.3)
RETINOPATHY: NEGATIVE
SODIUM SERPL-SCNC: 142 MMOL/L (ref 135–145)

## 2025-07-28 PROCEDURE — 85610 PROTHROMBIN TIME: CPT

## 2025-07-28 PROCEDURE — 80048 BASIC METABOLIC PNL TOTAL CA: CPT

## 2025-07-28 PROCEDURE — 36416 COLLJ CAPILLARY BLOOD SPEC: CPT

## 2025-07-28 PROCEDURE — 36415 COLL VENOUS BLD VENIPUNCTURE: CPT

## 2025-07-28 NOTE — PROGRESS NOTES
ANTICOAGULATION MANAGEMENT     Berta Witt 84 year old female is on warfarin with supratherapeutic INR result. (Goal INR 2.0-3.0)    Recent labs: (last 7 days)     07/28/25  0938   INR 3.3*       ASSESSMENT     Warfarin Lab Questionnaire    Warfarin Doses Last 7 Days      7/28/2025     9:34 AM   Dose in Tablet or Mg   TAB or MG? tablet (tab)     Pt Rptd Dose SUNDAY MONDAY TUESDAY WED THURS FRIDAY SATURDAY 7/28/2025   9:34 AM 1.5 1 1.5 1 1.5 1 1.5         7/28/2025   Warfarin Lab Questionnaire   Missed doses within past 14 days? No   Changes in diet or alcohol within past 14 days? No   Medication changes since last result? No   Injuries or illness since last result? No   New shortness of breath, severe headaches or sudden changes in vision since last result? No   Abnormal bleeding since last result? No   Upcoming surgery, procedure? No   Best number to call with results? [phone]     Previous result: Therapeutic last 2(+) visits  Additional findings: None       PLAN     Recommended plan for no diet, medication or health factor changes affecting INR     Dosing Instructions: decrease your warfarin dose (5.6% change) with next INR in 2 weeks       Summary  As of 7/28/2025      Full warfarin instructions:  7.5 mg every Sun, Tue, Thu; 5 mg all other days   Next INR check:  8/11/2025               Telephone call with Berta who verbalizes understanding and agrees to plan    Lab visit scheduled    Education provided: Please call back if any changes to your diet, medications or how you've been taking warfarin    Plan made per [facility] anticoagulation protocol    Tressa Quinones RN  7/28/2025  Anticoagulation Clinic  [facility] for routing messages: mely [address]  [facility] patient phone line: [phone]        _______________________________________________________________________     Anticoagulation Episode Summary       Current INR goal:  2.0-3.0   TTR:  58.5% (5.6 mo)   Target end date:  Indefinite   Send INR reminders  to:  [facility]    Indications    Multiple subsegmental pulmonary emboli without acute cor pulmonale (H) [I26.94]             Comments:  --             Anticoagulation Care Providers       Provider Role Specialty Phone number    Dianna Lucero MD Referring Internal Medicine [phone]

## 2025-07-30 ENCOUNTER — RESULTS FOLLOW-UP (OUTPATIENT)
Dept: INTERNAL MEDICINE | Facility: CLINIC | Age: 85
End: 2025-07-30
Payer: COMMERCIAL

## 2025-08-04 ENCOUNTER — TELEPHONE (OUTPATIENT)
Dept: INTERNAL MEDICINE | Facility: CLINIC | Age: 85
End: 2025-08-04
Payer: COMMERCIAL

## 2025-08-04 DIAGNOSIS — I26.94 MULTIPLE SUBSEGMENTAL PULMONARY EMBOLI WITHOUT ACUTE COR PULMONALE (H): ICD-10-CM

## 2025-08-06 RX ORDER — APIXABAN 5 MG/1
5 TABLET, FILM COATED ORAL 2 TIMES DAILY
Qty: 180 TABLET | Refills: 0 | OUTPATIENT
Start: 2025-08-06

## 2025-08-07 ENCOUNTER — VIRTUAL VISIT (OUTPATIENT)
Dept: INTERNAL MEDICINE | Facility: CLINIC | Age: 85
End: 2025-08-07
Payer: COMMERCIAL

## 2025-08-07 DIAGNOSIS — I26.94 MULTIPLE SUBSEGMENTAL PULMONARY EMBOLI WITHOUT ACUTE COR PULMONALE (H): ICD-10-CM

## 2025-08-07 DIAGNOSIS — M48.062 SPINAL STENOSIS OF LUMBAR REGION WITH NEUROGENIC CLAUDICATION: Primary | ICD-10-CM

## 2025-08-07 DIAGNOSIS — N18.30 CONTROLLED TYPE 2 DIABETES MELLITUS WITH STAGE 3 CHRONIC KIDNEY DISEASE, WITHOUT LONG-TERM CURRENT USE OF INSULIN (H): ICD-10-CM

## 2025-08-07 DIAGNOSIS — M54.16 LUMBAR RADICULOPATHY: ICD-10-CM

## 2025-08-07 DIAGNOSIS — R60.9 EDEMA, UNSPECIFIED TYPE: ICD-10-CM

## 2025-08-07 DIAGNOSIS — E11.22 CONTROLLED TYPE 2 DIABETES MELLITUS WITH STAGE 3 CHRONIC KIDNEY DISEASE, WITHOUT LONG-TERM CURRENT USE OF INSULIN (H): ICD-10-CM

## 2025-08-07 RX ORDER — GABAPENTIN 100 MG/1
100 CAPSULE ORAL 2 TIMES DAILY PRN
Qty: 40 CAPSULE | Refills: 1 | Status: SHIPPED | OUTPATIENT
Start: 2025-08-07

## 2025-08-07 ASSESSMENT — ENCOUNTER SYMPTOMS: BACK PAIN: 1

## 2025-08-11 ENCOUNTER — TELEPHONE (OUTPATIENT)
Dept: INTERNAL MEDICINE | Facility: CLINIC | Age: 85
End: 2025-08-11

## 2025-08-11 ENCOUNTER — LAB (OUTPATIENT)
Dept: LAB | Facility: CLINIC | Age: 85
End: 2025-08-11
Payer: COMMERCIAL

## 2025-08-11 ENCOUNTER — ANTICOAGULATION THERAPY VISIT (OUTPATIENT)
Dept: ANTICOAGULATION | Facility: CLINIC | Age: 85
End: 2025-08-11

## 2025-08-11 DIAGNOSIS — R60.9 EDEMA, UNSPECIFIED TYPE: ICD-10-CM

## 2025-08-11 DIAGNOSIS — E11.22 CONTROLLED TYPE 2 DIABETES MELLITUS WITH STAGE 3 CHRONIC KIDNEY DISEASE, WITHOUT LONG-TERM CURRENT USE OF INSULIN (H): Primary | ICD-10-CM

## 2025-08-11 DIAGNOSIS — I26.94 MULTIPLE SUBSEGMENTAL PULMONARY EMBOLI WITHOUT ACUTE COR PULMONALE (H): Primary | ICD-10-CM

## 2025-08-11 DIAGNOSIS — I26.94 MULTIPLE SUBSEGMENTAL PULMONARY EMBOLI WITHOUT ACUTE COR PULMONALE (H): ICD-10-CM

## 2025-08-11 DIAGNOSIS — N18.30 CONTROLLED TYPE 2 DIABETES MELLITUS WITH STAGE 3 CHRONIC KIDNEY DISEASE, WITHOUT LONG-TERM CURRENT USE OF INSULIN (H): Primary | ICD-10-CM

## 2025-08-11 LAB — INR BLD: 2.7 (ref 0.9–1.1)

## 2025-08-11 PROCEDURE — 36416 COLLJ CAPILLARY BLOOD SPEC: CPT

## 2025-08-11 PROCEDURE — 85610 PROTHROMBIN TIME: CPT

## 2025-08-11 RX ORDER — FUROSEMIDE 20 MG/1
20 TABLET ORAL
Qty: 30 TABLET | Refills: 3 | Status: SHIPPED | OUTPATIENT
Start: 2025-08-11

## 2025-08-12 ENCOUNTER — THERAPY VISIT (OUTPATIENT)
Dept: PHYSICAL THERAPY | Facility: REHABILITATION | Age: 85
End: 2025-08-12
Attending: INTERNAL MEDICINE
Payer: COMMERCIAL

## 2025-08-12 ENCOUNTER — TELEPHONE (OUTPATIENT)
Dept: INTERNAL MEDICINE | Facility: CLINIC | Age: 85
End: 2025-08-12

## 2025-08-12 DIAGNOSIS — M54.16 LUMBAR RADICULOPATHY: ICD-10-CM

## 2025-08-12 DIAGNOSIS — M48.062 SPINAL STENOSIS OF LUMBAR REGION WITH NEUROGENIC CLAUDICATION: ICD-10-CM

## 2025-08-12 PROCEDURE — 97110 THERAPEUTIC EXERCISES: CPT | Mod: GP | Performed by: PHYSICAL THERAPIST

## 2025-08-12 PROCEDURE — 97161 PT EVAL LOW COMPLEX 20 MIN: CPT | Mod: GP | Performed by: PHYSICAL THERAPIST

## 2025-08-13 ENCOUNTER — TELEPHONE (OUTPATIENT)
Dept: INTERNAL MEDICINE | Facility: CLINIC | Age: 85
End: 2025-08-13
Payer: COMMERCIAL

## 2025-08-13 ENCOUNTER — TELEPHONE (OUTPATIENT)
Dept: ANTICOAGULATION | Facility: CLINIC | Age: 85
End: 2025-08-13
Payer: COMMERCIAL

## 2025-08-13 DIAGNOSIS — I26.94 MULTIPLE SUBSEGMENTAL PULMONARY EMBOLI WITHOUT ACUTE COR PULMONALE (H): ICD-10-CM

## 2025-08-13 DIAGNOSIS — M54.42 ACUTE LEFT-SIDED LOW BACK PAIN WITH LEFT-SIDED SCIATICA: Primary | ICD-10-CM

## 2025-08-20 ENCOUNTER — HOSPITAL ENCOUNTER (OUTPATIENT)
Dept: MRI IMAGING | Facility: HOSPITAL | Age: 85
Discharge: HOME | End: 2025-08-20
Attending: INTERNAL MEDICINE
Payer: COMMERCIAL

## 2025-08-20 DIAGNOSIS — M48.062 SPINAL STENOSIS OF LUMBAR REGION WITH NEUROGENIC CLAUDICATION: ICD-10-CM

## 2025-08-20 DIAGNOSIS — M54.16 LUMBAR RADICULOPATHY: ICD-10-CM

## 2025-08-20 PROCEDURE — 72148 MRI LUMBAR SPINE W/O DYE: CPT

## 2025-08-21 ENCOUNTER — OFFICE VISIT (OUTPATIENT)
Dept: PHARMACY | Facility: CLINIC | Age: 85
End: 2025-08-21
Payer: COMMERCIAL

## 2025-08-21 VITALS — DIASTOLIC BLOOD PRESSURE: 70 MMHG | SYSTOLIC BLOOD PRESSURE: 130 MMHG

## 2025-08-21 DIAGNOSIS — N18.30 CONTROLLED TYPE 2 DIABETES MELLITUS WITH STAGE 3 CHRONIC KIDNEY DISEASE, WITHOUT LONG-TERM CURRENT USE OF INSULIN (H): Primary | ICD-10-CM

## 2025-08-21 DIAGNOSIS — I10 ESSENTIAL HYPERTENSION WITH GOAL BLOOD PRESSURE LESS THAN 140/90: ICD-10-CM

## 2025-08-21 DIAGNOSIS — E11.22 CONTROLLED TYPE 2 DIABETES MELLITUS WITH STAGE 3 CHRONIC KIDNEY DISEASE, WITHOUT LONG-TERM CURRENT USE OF INSULIN (H): Primary | ICD-10-CM

## 2025-08-21 DIAGNOSIS — I26.94 MULTIPLE SUBSEGMENTAL PULMONARY EMBOLI WITHOUT ACUTE COR PULMONALE (H): ICD-10-CM

## 2025-08-25 ENCOUNTER — TELEPHONE (OUTPATIENT)
Dept: INTERNAL MEDICINE | Facility: CLINIC | Age: 85
End: 2025-08-25
Payer: COMMERCIAL

## 2025-08-26 ENCOUNTER — TELEPHONE (OUTPATIENT)
Dept: INTERNAL MEDICINE | Facility: CLINIC | Age: 85
End: 2025-08-26
Payer: COMMERCIAL

## 2025-08-26 DIAGNOSIS — M54.16 LUMBAR RADICULOPATHY: Primary | ICD-10-CM

## 2025-09-02 ENCOUNTER — DOCUMENTATION ONLY (OUTPATIENT)
Dept: ANTICOAGULATION | Facility: CLINIC | Age: 85
End: 2025-09-02
Payer: COMMERCIAL

## 2025-09-02 ENCOUNTER — TELEPHONE (OUTPATIENT)
Dept: INTERNAL MEDICINE | Facility: CLINIC | Age: 85
End: 2025-09-02
Payer: COMMERCIAL

## 2025-09-02 DIAGNOSIS — I26.94 MULTIPLE SUBSEGMENTAL PULMONARY EMBOLI WITHOUT ACUTE COR PULMONALE (H): Primary | ICD-10-CM

## 2025-09-04 ENCOUNTER — THERAPY VISIT (OUTPATIENT)
Dept: PHYSICAL THERAPY | Facility: REHABILITATION | Age: 85
End: 2025-09-04
Payer: COMMERCIAL

## 2025-09-04 DIAGNOSIS — M54.42 ACUTE LEFT-SIDED LOW BACK PAIN WITH LEFT-SIDED SCIATICA: ICD-10-CM

## 2025-09-04 DIAGNOSIS — M53.3 SACROILIAC JOINT PAIN: Primary | ICD-10-CM

## 2025-09-04 PROBLEM — M54.16 LUMBAR RADICULOPATHY: Status: ACTIVE | Noted: 2025-09-04
